# Patient Record
Sex: MALE | Race: WHITE | NOT HISPANIC OR LATINO | Employment: FULL TIME | URBAN - METROPOLITAN AREA
[De-identification: names, ages, dates, MRNs, and addresses within clinical notes are randomized per-mention and may not be internally consistent; named-entity substitution may affect disease eponyms.]

---

## 2017-07-25 ENCOUNTER — GENERIC CONVERSION - ENCOUNTER (OUTPATIENT)
Dept: OTHER | Facility: OTHER | Age: 47
End: 2017-07-25

## 2018-01-04 ENCOUNTER — ALLSCRIPTS OFFICE VISIT (OUTPATIENT)
Dept: OTHER | Facility: OTHER | Age: 48
End: 2018-01-04

## 2018-01-05 NOTE — PROGRESS NOTES
Assessment   1  Change in mole (216 9) (L81 9)    Plan   Change in mole    · Lola Bence MD, Ivan Roman (Dermatology) Co-Management  *eval and treat  Changing mole on left    side posterior scapular  Status: Hold For - Scheduling  Requested for: [id number]  Care Summary provided  : Yes  Health Maintenance    · You need to quit smoking ; Status:Complete - Retrospective Authorization;   Done:    [id number]  PMH: History of influenza vaccination    · Stop: Fluzone Quadrivalent 0 5 ML Intramuscular Suspension Prefilled    Syringe    Discussion/Summary      Change in mole- Refer to Dermatology for removal     The patient was counseled regarding  Possible side effects of new medications were reviewed with the patient/guardian today  The treatment plan was reviewed with the patient/guardian  The patient/guardian understands and agrees with the treatment plan       Self Referrals: No      Chief Complaint   Patient is here today to have mole on back looked at due to change in shape over time      History of Present Illness   Pt has a mole on his posterior shoulder that has been present for a few years  It has been growing in size over a few years  No itching or burning  Review of Systems        Constitutional: No fever or chills, feels well, no tiredness, no recent weight gain or weight loss  Integumentary: skin lesion, but-- as noted in HPI  Active Problems   1  Alcohol use   2  Backache (724 5) (M54 9)   3  Carpal tunnel syndrome (354 0) (G56 00)   4  Depression with anxiety (300 4) (F41 8)    Past Medical History      The active problems and past medical history were reviewed and updated today  Surgical History      The surgical history was reviewed and updated today  Family History   Father    1  Family history of cardiac disorder (V17 49) (Z82 49)     The family history was reviewed and updated today         Social History    · Alcohol use   · Moderate alcohol use  The social history was reviewed and updated today  The social history was reviewed and is unchanged  Current Meds    1  No Reported Medications Recorded     The medication list was reviewed and updated today  Allergies   1  No Known Drug Allergies    Vitals   Vital Signs    Recorded: 14TNX9174 10:16AM   Temperature 96 7 F   Heart Rate 78   Systolic 982   Diastolic 82   Height 6 ft 2 in   Weight 221 lb 6 08 oz   BMI Calculated 28 42   BSA Calculated 2 27   O2 Saturation 96     Physical Exam        Constitutional      General appearance: No acute distress, well appearing and well nourished  Eyes      Conjunctiva and lids: No swelling, erythema, or discharge  Pupils and irises: Equal, round and reactive to light  Ears, Nose, Mouth, and Throat      External inspection of ears and nose: Normal        Pulmonary      Respiratory effort: No increased work of breathing or signs of respiratory distress  Musculoskeletal      Gait and station: Normal        Skin      Skin and subcutaneous tissue: Abnormal  -- left posterior scapula region symmetrical mole 18 mm in lngth, brown in color  Nontender, no erythema or crusting        Psychiatric      Orientation to person, place and time: Normal        Mood and affect: Normal           Signatures    Electronically signed by : Albaro Freedman NP; Jan 4 2018 10:33AM EST                       (Author)     Electronically signed by : Neel Joseph MD; Jan 4 2018 12:03PM EST                       (Co-author)

## 2018-01-16 NOTE — MISCELLANEOUS
Provider Comments  Provider Comments:   Patient did not show for this appointment      Signatures   Electronically signed by : Blank Simon MD; Jul 26 2017  1:49PM EST                       (Author)

## 2018-01-22 VITALS
HEART RATE: 78 BPM | SYSTOLIC BLOOD PRESSURE: 120 MMHG | TEMPERATURE: 96.7 F | WEIGHT: 221.38 LBS | HEIGHT: 74 IN | OXYGEN SATURATION: 96 % | DIASTOLIC BLOOD PRESSURE: 82 MMHG | BODY MASS INDEX: 28.41 KG/M2

## 2018-02-01 ENCOUNTER — OFFICE VISIT (OUTPATIENT)
Dept: FAMILY MEDICINE CLINIC | Facility: CLINIC | Age: 48
End: 2018-02-01
Payer: COMMERCIAL

## 2018-02-01 VITALS
HEIGHT: 74 IN | WEIGHT: 221.4 LBS | RESPIRATION RATE: 18 BRPM | TEMPERATURE: 97 F | HEART RATE: 60 BPM | OXYGEN SATURATION: 97 % | BODY MASS INDEX: 28.42 KG/M2 | DIASTOLIC BLOOD PRESSURE: 76 MMHG | SYSTOLIC BLOOD PRESSURE: 126 MMHG

## 2018-02-01 DIAGNOSIS — S20.219A CONTUSION OF CHEST WALL WITH INTACT SKIN: ICD-10-CM

## 2018-02-01 DIAGNOSIS — Z13.1 DIABETES MELLITUS SCREENING: ICD-10-CM

## 2018-02-01 DIAGNOSIS — Z13.220 LIPID SCREENING: Primary | ICD-10-CM

## 2018-02-01 PROCEDURE — 99214 OFFICE O/P EST MOD 30 MIN: CPT | Performed by: FAMILY MEDICINE

## 2018-02-01 RX ORDER — DOXYCYCLINE HYCLATE 100 MG/1
100 CAPSULE ORAL EVERY 12 HOURS SCHEDULED
Qty: 14 CAPSULE | Refills: 0 | Status: SHIPPED | OUTPATIENT
Start: 2018-02-01 | End: 2018-02-08

## 2018-02-01 NOTE — PROGRESS NOTES
Assessment/Plan:    No problem-specific Assessment & Plan notes found for this encounter  Diagnoses and all orders for this visit:    Lipid screening  -     Lipid panel; Future  -     Lipid panel    Diabetes mellitus screening  -     Comprehensive metabolic panel; Future  -     Comprehensive metabolic panel    Contusion of chest wall with intact skin  -     doxycycline hyclate (VIBRAMYCIN) 100 mg capsule; Take 1 capsule (100 mg total) by mouth every 12 (twelve) hours for 7 days  -     Ambulatory referral to General Surgery; Future    Other orders  -     halobetasol (ULTRAVATE) 0 05 % cream;       After discussing risks and benefits of medication along with side effects will start doxycyline 100 mg po twice daily at this time  Also referred to General surgeon if no movement of symptoms in 1 week     Follow up in 1 week or as needed    Subjective:      Patient ID: Devika Kimbrough is a 52 y o  male  Abscess  Patient presents for evaluation of a cutaneous abscess  Lesion is located in the right inguinal region  Onset was 6 weeks ago  Symptoms have gradually improved  Abscess has associated symptoms of spontaneous drainage  Patient does not have previous history of cutaneous abscesses  Patient does not have diabetes  He states that he was working on a crane and hit his right inguinal area while he was walking down the stairs of his lift hitting himself on the right side  He noted a bruise on that side that was inflamed and was draining recently however it has since stopped             The following portions of the patient's history were reviewed and updated as appropriate:   He  has a past medical history of Eczema  He  does not have a problem list on file  He  has no past surgical history on file  His family history includes Mental illness in his father and mother; Substance Abuse in his father and mother  He  reports that he has been smoking  He has been smoking about 1 00 pack per day   He has quit using smokeless tobacco  His alcohol and drug histories are not on file  Current Outpatient Prescriptions   Medication Sig Dispense Refill    doxycycline hyclate (VIBRAMYCIN) 100 mg capsule Take 1 capsule (100 mg total) by mouth every 12 (twelve) hours for 7 days 14 capsule 0    halobetasol (ULTRAVATE) 0 05 % cream        No current facility-administered medications for this visit  No current outpatient prescriptions on file prior to visit  No current facility-administered medications on file prior to visit  He has No Known Allergies       Review of Systems   Constitutional: Negative for activity change, appetite change, fatigue and fever  HENT: Negative for congestion and ear discharge  Respiratory: Negative for cough and shortness of breath  Cardiovascular: Negative for chest pain and palpitations  Gastrointestinal: Negative for diarrhea and nausea  Musculoskeletal: Negative for arthralgias and back pain  Skin: Positive for color change, rash and wound  Purple skin discoloration noted at the right groin inguinal area, no tenderness or swelling associated with it however  Psychiatric/Behavioral: Negative for agitation and behavioral problems  Objective:     Physical Exam   Constitutional: He is oriented to person, place, and time  He appears well-developed and well-nourished  No distress  Eyes: Pupils are equal, round, and reactive to light  No scleral icterus  Cardiovascular: Normal rate, regular rhythm and normal heart sounds  No murmur heard  Pulmonary/Chest: Effort normal and breath sounds normal  No respiratory distress  He has no wheezes  Abdominal: Soft  Bowel sounds are normal  He exhibits no distension  There is no tenderness  Neurological: He is alert and oriented to person, place, and time  Skin: Skin is warm and dry  Rash noted  He is not diaphoretic     Purple skin discoloration noted at the right groin inguinal area, no tenderness or swelling associated with it however  Psychiatric: He has a normal mood and affect

## 2018-02-14 ENCOUNTER — OFFICE VISIT (OUTPATIENT)
Dept: SURGERY | Facility: CLINIC | Age: 48
End: 2018-02-14
Payer: COMMERCIAL

## 2018-02-14 VITALS
RESPIRATION RATE: 14 BRPM | HEIGHT: 74 IN | HEART RATE: 78 BPM | BODY MASS INDEX: 27.72 KG/M2 | TEMPERATURE: 98.3 F | SYSTOLIC BLOOD PRESSURE: 122 MMHG | WEIGHT: 216.04 LBS | DIASTOLIC BLOOD PRESSURE: 74 MMHG

## 2018-02-14 DIAGNOSIS — L73.2 HIDRADENITIS SUPPURATIVA: ICD-10-CM

## 2018-02-14 DIAGNOSIS — S20.219A CONTUSION OF CHEST WALL WITH INTACT SKIN: Primary | ICD-10-CM

## 2018-02-14 PROCEDURE — 99204 OFFICE O/P NEW MOD 45 MIN: CPT | Performed by: SURGERY

## 2018-02-14 RX ORDER — CLINDAMYCIN HYDROCHLORIDE 300 MG/1
300 CAPSULE ORAL 2 TIMES DAILY
Qty: 60 CAPSULE | Refills: 2 | Status: SHIPPED | OUTPATIENT
Start: 2018-02-14 | End: 2018-05-15

## 2018-02-14 NOTE — PROGRESS NOTES
GENERAL SURGERY CONSULT      Camillia Alpers 52 y o  male MRN: [id number]  Unit/Bed#:  Encounter: [id number]      Assessment/Plan   Patient Active Problem List   Diagnosis    Hidradenitis suppurativa     Patient with what appears to be recurrence hidradenitis affecting the right groin  I suspect the patient underwent a previous excision of the same 2 years ago  I do not appreciate any fluctuance requiring drainage at this time  Will start him on a course of oral clindamycin 300 b i d  for the next several months  Will reassess in 1 month to see if he has improved  Should the patient fail may require addition of rifampin and should fully fail medical treatment may require further surgical excision  Chief Complaint drainage swelling right groin    HPI: Camillia Alpers is a 52y o  year old male who presents with almost 2 months pain and drainage from the right growing  Patient has had previous cysts excised from bilateral groin approximately 2 years ago  He is not certain of the exact nature of the disease at that time but he said it was somewhat similar to what he is currently  Denies any previous history of similar soft tissue infections or masses  Denies any previous other surgical history  No current medical issues no daily medications  No other acute concerns  Patient does work as a  therefore he is sitting prone and potentially high heat with lots of sweating for prolonged periods of time  Was provided a script by PCP that he did not start  Drainage is about the same since his last visit  ROS:  12 Point ROS reviewed and negative except for:  Swelling and drainage from the right groin  Otherwise negative    Historical Information   Past Medical History:   Diagnosis Date    Eczema      No past surgical history on file    Social History   History   Alcohol Use    Yes     Comment: social     History   Drug Use No     History   Smoking Status    Current Every Day Smoker    Packs/day: 1 00    Years: 20 00   Smokeless Tobacco    Former User     Family History: no pertinent family history  No Known Allergies  Meds/Allergies   all current active meds have been reviewed      Objective   Vitals: Blood pressure 122/74, pulse 78, temperature 98 3 °F (36 8 °C), temperature source Oral, resp  rate 14, height 6' 2" (1 88 m), weight 98 kg (216 lb 0 6 oz)  ,Body mass index is 27 74 kg/m²  Invasive Devices          No matching active lines, drains, or airways          Physical Exam:    General appearance: Awake alert oriented no acute distress  HEENT: Sclera clear, anicterus, no discharge  Neck: Trachea is midline, no adenopathy  Lungs: No respiratory distress, clear to auscultation bilaterally  Heart[de-identified] RRR  Abdomen: soft, nondistended, nontender  Extremities: No edema, nontender  Skin:No rashes  Bilateral groin previous surgical scars no acute issues  Inferior to the previous excision site on the right is an area of scarring with pits with thin expressible drainage  Similar findings to an area closer to the skin crease near the scrotum  There is no fluctuance or extensive induration or overlying erythema  Appearance at both sites consistent with hidradenitis  Exam of the bilateral axilla showed no disease    Neurologic: No weakness or loss of sensation  Psych: Affect appropriate    Nas Caruso MD  2/14/2018

## 2018-09-12 ENCOUNTER — OFFICE VISIT (OUTPATIENT)
Dept: SURGERY | Facility: CLINIC | Age: 48
End: 2018-09-12
Payer: COMMERCIAL

## 2018-09-12 VITALS
HEIGHT: 74 IN | BODY MASS INDEX: 29.26 KG/M2 | HEART RATE: 76 BPM | SYSTOLIC BLOOD PRESSURE: 130 MMHG | RESPIRATION RATE: 13 BRPM | WEIGHT: 228 LBS | TEMPERATURE: 97.8 F | DIASTOLIC BLOOD PRESSURE: 78 MMHG

## 2018-09-12 DIAGNOSIS — L73.2 HIDRADENITIS SUPPURATIVA: Primary | ICD-10-CM

## 2018-09-12 PROCEDURE — 99214 OFFICE O/P EST MOD 30 MIN: CPT | Performed by: SURGERY

## 2018-09-12 RX ORDER — CLINDAMYCIN HYDROCHLORIDE 300 MG/1
300 CAPSULE ORAL 2 TIMES DAILY
Qty: 60 CAPSULE | Refills: 1 | Status: SHIPPED | OUTPATIENT
Start: 2018-09-12 | End: 2018-09-12 | Stop reason: SDUPTHER

## 2018-09-12 RX ORDER — CLINDAMYCIN HYDROCHLORIDE 300 MG/1
300 CAPSULE ORAL 2 TIMES DAILY
Qty: 60 CAPSULE | Refills: 0 | Status: SHIPPED | OUTPATIENT
Start: 2018-09-12 | End: 2018-11-11

## 2018-09-12 NOTE — PROGRESS NOTES
GENERAL SURGERY HISTORY AND PHYSICAL     Kenia Sim 52 y o  male MRN: [id number]  Unit/Bed#:  Encounter: [id number]      Assessment/Plan   1  Hidradenitis suppurativa  clindamycin (CLEOCIN) 300 MG capsule    DISCONTINUED: clindamycin (CLEOCIN) 300 MG capsule     Patient has findings consistent with recurrent hidradenitis of the right thigh crease  Given the patient's description I suspect he had a previous excision of similar findings in the past   I do not see any definitive fluid collection amenable to surgical drainage at this time  Will start patient on oral clindamycin again which she seemed to respond well to after his last visit  Will have patient back in 1 month for further assessment  Given the since the location and proximity to the scrotum I would likely request a plastics evaluation for any surgical intervention at the site  Again will have patient back in 1 month for further assessment  Chief Complaint right groin/scrotal drainage swelling    HPI: Kenia Sim is a 52y o  year old male   known to me, evaluated during February of this year and diagnosed with likely hidradenitis of the bilateral groin  Patient underwent previous excision of these areas  Start him on a course of clindamycin due to swelling and drainage in the right groin  Patient states that his symptoms resolved on the right growing  Though he did say shortly thereafter began to have swelling and drainage from an area on the proximal scrotum just in the thigh crease  Patient states he had had a previous excision of a cyst from this area  Does not believe there is any intervention done on spermatic cord structures  Patient denies fevers chills no other acute changes  ROS:  12 Point ROS reviewed and negative except for:   Drainage from the right groin, scrotum    Historical Information   Past Medical History:   Diagnosis Date    Eczema      No past surgical history on file    Social History   History   Alcohol Use  Yes     Comment: social; moderate     History   Drug Use No     History   Smoking Status    Current Every Day Smoker    Packs/day: 1 00    Years: 20 00   Smokeless Tobacco    Former User     Family History: no pertinent family history  No Known Allergies  Meds/Allergies   all current active meds have been reviewed      Objective   Vitals: Blood pressure 130/78, pulse 76, temperature 97 8 °F (36 6 °C), temperature source Oral, resp  rate 13, height 6' 2" (1 88 m), weight 103 kg (228 lb)  ,Body mass index is 29 27 kg/m²  Physical Exam:    General appearance: Awake alert oriented no acute distress  HEENT: Sclera clear, anicterus, no discharge  Neck: Trachea is midline  Lungs: No respiratory distress  Skin:  Bilateral groin in the inguinal area there is previous scarring from hidradenitis and previous excision  Stable no extensive inflammation or drainage  Within the thigh crease itself at the most proximal portion of the scrotum there is an area of thickened tissue with multiple pits with thin expressible serous drainage no overlying erythema no dalia purulence no palpable fluctuance amenable to drainage at this time    Neurologic:   Ambulating with no difficulty  Psych: Affect appropriate    Jhon Laurent MD  9/12/2018

## 2018-10-24 ENCOUNTER — OFFICE VISIT (OUTPATIENT)
Dept: SURGERY | Facility: CLINIC | Age: 48
End: 2018-10-24
Payer: COMMERCIAL

## 2018-10-24 VITALS
RESPIRATION RATE: 14 BRPM | DIASTOLIC BLOOD PRESSURE: 90 MMHG | HEIGHT: 74 IN | TEMPERATURE: 98 F | SYSTOLIC BLOOD PRESSURE: 138 MMHG | WEIGHT: 237 LBS | BODY MASS INDEX: 30.42 KG/M2 | HEART RATE: 74 BPM

## 2018-10-24 DIAGNOSIS — L73.2 HIDRADENITIS SUPPURATIVA: Primary | ICD-10-CM

## 2018-10-24 PROCEDURE — 99214 OFFICE O/P EST MOD 30 MIN: CPT | Performed by: SURGERY

## 2018-10-24 NOTE — PROGRESS NOTES
GENERAL SURGERY HISTORY AND PHYSICAL     Cammie Shoemaker 50 y o  male MRN: [id number]  Unit/Bed#:  Encounter: [id number]      Assessment/Plan   1  Hidradenitis suppurativa  Ambulatory referral to Plastic Surgery     Patient with worsening hidradenitis of the right growing  In spite of antibiotic therapy patient still has significant swelling and drainage from the site  Given the recurrent nature of the patient's disease, previous excisions of been done in the past   Also given the location of the patient's worst disease I recommend he undergo evaluation and treatment by plastic surgeon  Recommend he continue with the oral clindamycin to help control symptoms  Chief Complaint drainage from the right groin    HPI: Cammie Shoemaker is a 50y o  year old male who presents for follow-up regarding hidradenitis of the groin  Patient said was initially doing well with a course of oral clinda been began to have increasing swelling and drainage from the right growing  Patient is again undergone multiple previous excisions of hidradenitis and groins as well as the thigh creases themselves  No other acute changes      ROS:  12 Point ROS reviewed and negative except for:  Drainage swelling the right groin    Historical Information   Past Medical History:   Diagnosis Date    Eczema      No past surgical history on file  Social History   History   Alcohol Use    Yes     Comment: social; moderate     History   Drug Use No     History   Smoking Status    Current Every Day Smoker    Packs/day: 1 00    Years: 20 00   Smokeless Tobacco    Former User     Family History: no pertinent family history  No Known Allergies  Meds/Allergies   all current active meds have been reviewed      Objective   Vitals: Blood pressure 138/90, pulse 74, temperature 98 °F (36 7 °C), temperature source Oral, resp  rate 14, height 6' 2" (1 88 m), weight 108 kg (237 lb)  ,Body mass index is 30 43 kg/m²    Physical Exam:    General appearance: Awake alert oriented no acute distress  HEENT: Sclera clear, anicterus, no discharge  Neck: Trachea is midline  Lungs: No respiratory distress  Extremities: No edema, nontender  Skin:  Stable changes of scarring in the inguinal region bilaterally  Previous pits and excision sites are noted here  There is increased swelling and drainage from an area just proximal to the scrotum in the right thigh crease  There is no palpable fluctuance or tense fluid collections necessitating drainage at this time  Easily able to express thin fluid from the area  Some changes of hidradenitis in the similar location on the left side but none actively inflamed or requiring intervention    Neurologic: No weakness or loss of sensation  Psych: Affect appropriate    Darren Saldana MD  10/24/2018

## 2018-10-25 ENCOUNTER — TELEPHONE (OUTPATIENT)
Dept: SURGERY | Facility: CLINIC | Age: 48
End: 2018-10-25

## 2018-10-25 NOTE — TELEPHONE ENCOUNTER
Called plastic surgery to set up an appointment and was told by [facility] that they will call the pt  Called the listed number to inform the pt of this and left a message with his mother stating the above

## 2019-08-26 ENCOUNTER — OFFICE VISIT (OUTPATIENT)
Dept: FAMILY MEDICINE CLINIC | Facility: CLINIC | Age: 49
End: 2019-08-26
Payer: COMMERCIAL

## 2019-08-26 VITALS
WEIGHT: 256 LBS | HEIGHT: 74 IN | TEMPERATURE: 98.2 F | OXYGEN SATURATION: 95 % | SYSTOLIC BLOOD PRESSURE: 146 MMHG | BODY MASS INDEX: 32.85 KG/M2 | DIASTOLIC BLOOD PRESSURE: 94 MMHG | HEART RATE: 70 BPM

## 2019-08-26 DIAGNOSIS — R21 RASH: ICD-10-CM

## 2019-08-26 DIAGNOSIS — L40.9 PSORIASIS: Primary | ICD-10-CM

## 2019-08-26 PROCEDURE — 4004F PT TOBACCO SCREEN RCVD TLK: CPT | Performed by: FAMILY MEDICINE

## 2019-08-26 PROCEDURE — 99214 OFFICE O/P EST MOD 30 MIN: CPT | Performed by: FAMILY MEDICINE

## 2019-08-26 PROCEDURE — 3008F BODY MASS INDEX DOCD: CPT | Performed by: FAMILY MEDICINE

## 2019-08-26 RX ORDER — TRIAMCINOLONE ACETONIDE 1 MG/G
CREAM TOPICAL 2 TIMES DAILY
Qty: 30 G | Refills: 0 | Status: SHIPPED | OUTPATIENT
Start: 2019-08-26 | End: 2019-08-26 | Stop reason: CLARIF

## 2019-08-26 NOTE — PROGRESS NOTES
Hector Starks 1970 male MRN: [id number]      ASSESSMENT/PLAN  Problem List Items Addressed This Visit        Musculoskeletal and Integument    Psoriasis - Primary    Relevant Medications    halobetasol (ULTRAVATE) 0 05 % cream        Rash likely psoriatic  No associated joint symptoms at this time  Will trial high potency steroid and RTC in 1 month to monitor improvement  BMI Counseling: Body mass index is 32 87 kg/m²  Discussed the patient's BMI with him  The BMI is above average  BMI counseling and education was provided to the patient  Nutrition recommendations include reducing portion sizes, decreasing overall calorie intake, 3-5 servings of fruits/vegetables daily, reducing fast food intake and consuming healthier snacks  Exercise recommendations include exercising 3-5 times per week  Future Appointments   Date Time Provider Shaun Velasquez   9/23/2019 11:20 AM DO CARLOS MANUEL [facility]          SUBJECTIVE  CC: Eczema      HPI:  Hector Starks is a 50 y o  male who presents for an acute visit  1 month history of rash  - Nothing changed prior to onset (no new medications, exposures)  - Pruritic  - Pt denies having this previously  - States it started as small circles that "spread" into larger areas   - Has not tried anything to improve symptoms     Review of Systems   Musculoskeletal: Negative for arthralgias and joint swelling  Skin: Positive for rash  Historical Information   The patient history was reviewed and updated as follows:    Past Medical History:   Diagnosis Date    Chest pain 9/25/2014    Eczema     Rib pain 1/28/2015     History reviewed  No pertinent surgical history    Family History   Problem Relation Age of Onset    Mental illness Mother         denied    Substance Abuse Mother         denied    Mental illness Father         denied    Substance Abuse Father         denied    Heart disease Father         cardiac disorder      Social History Social History     Substance and Sexual Activity   Alcohol Use Yes    Comment: social; moderate     Social History     Substance and Sexual Activity   Drug Use No     Social History     Tobacco Use   Smoking Status Current Every Day Smoker    Packs/day: 1 00    Years: 20 00    Pack years: 20 00   Smokeless Tobacco Former User       Medications:     Current Outpatient Medications:     halobetasol (ULTRAVATE) 0 05 % cream, Apply topically once for 1 dose, Disp: 50 g, Rfl: 1  No Known Allergies    OBJECTIVE    Vitals:   Vitals:    08/26/19 0841   BP: 146/94   Pulse: 70   Temp: 98 2 °F (36 8 °C)   SpO2: 95%   Weight: 116 kg (256 lb)   Height: 6' 2" (1 88 m)           Physical Exam   Constitutional: He appears well-developed and well-nourished  No distress  HENT:   Head: Normocephalic and atraumatic  Pulmonary/Chest: Effort normal    Neurological: He is alert  Skin:   Thickened purple/red plaques over LLE (as pictured below), as well as scaly silver plaques over R elbow and R 4th and 5th digits   Psychiatric: He has a normal mood and affect                            DO Nakia N[facility]   8/26/2019  9:46 AM

## 2019-09-23 ENCOUNTER — OFFICE VISIT (OUTPATIENT)
Dept: FAMILY MEDICINE CLINIC | Facility: CLINIC | Age: 49
End: 2019-09-23
Payer: COMMERCIAL

## 2019-09-23 VITALS
HEIGHT: 74 IN | BODY MASS INDEX: 32.98 KG/M2 | WEIGHT: 257 LBS | TEMPERATURE: 97.8 F | SYSTOLIC BLOOD PRESSURE: 148 MMHG | OXYGEN SATURATION: 96 % | DIASTOLIC BLOOD PRESSURE: 86 MMHG | HEART RATE: 72 BPM

## 2019-09-23 DIAGNOSIS — R21 RASH: ICD-10-CM

## 2019-09-23 DIAGNOSIS — L40.9 PSORIASIS: Primary | ICD-10-CM

## 2019-09-23 PROBLEM — L73.2 HIDRADENITIS SUPPURATIVA: Status: RESOLVED | Noted: 2018-02-14 | Resolved: 2019-09-23

## 2019-09-23 PROCEDURE — 99213 OFFICE O/P EST LOW 20 MIN: CPT | Performed by: FAMILY MEDICINE

## 2019-09-23 PROCEDURE — 3008F BODY MASS INDEX DOCD: CPT | Performed by: FAMILY MEDICINE

## 2019-10-14 DIAGNOSIS — L40.9 PSORIASIS: Primary | ICD-10-CM

## 2019-10-14 RX ORDER — TRIAMCINOLONE ACETONIDE 1 MG/G
CREAM TOPICAL
COMMUNITY
Start: 2019-08-26 | End: 2019-10-14 | Stop reason: SDUPTHER

## 2019-10-14 RX ORDER — TRIAMCINOLONE ACETONIDE 1 MG/G
CREAM TOPICAL 2 TIMES DAILY
Qty: 30 G | Refills: 1 | Status: SHIPPED | OUTPATIENT
Start: 2019-10-14 | End: 2019-11-23 | Stop reason: SDUPTHER

## 2019-11-23 DIAGNOSIS — L40.9 PSORIASIS: ICD-10-CM

## 2019-11-25 RX ORDER — TRIAMCINOLONE ACETONIDE 1 MG/G
CREAM TOPICAL
Qty: 30 G | Refills: 1 | Status: SHIPPED | OUTPATIENT
Start: 2019-11-25 | End: 2020-06-01 | Stop reason: ALTCHOICE

## 2020-03-12 ENCOUNTER — TRANSCRIBE ORDERS (OUTPATIENT)
Dept: ADMINISTRATIVE | Facility: HOSPITAL | Age: 50
End: 2020-03-12

## 2020-03-12 DIAGNOSIS — G47.30 SLEEP APNEA, UNSPECIFIED TYPE: Primary | ICD-10-CM

## 2020-03-26 ENCOUNTER — TELEPHONE (OUTPATIENT)
Dept: FAMILY MEDICINE CLINIC | Facility: CLINIC | Age: 50
End: 2020-03-26

## 2020-03-26 ENCOUNTER — TELEMEDICINE (OUTPATIENT)
Dept: FAMILY MEDICINE CLINIC | Facility: CLINIC | Age: 50
End: 2020-03-26
Payer: COMMERCIAL

## 2020-03-26 DIAGNOSIS — Z20.828 EXPOSURE TO SARS-ASSOCIATED CORONAVIRUS: Primary | ICD-10-CM

## 2020-03-26 PROCEDURE — 99212 OFFICE O/P EST SF 10 MIN: CPT | Performed by: PHYSICIAN ASSISTANT

## 2020-03-26 NOTE — TELEPHONE ENCOUNTER
Called patient, no answer  We do not test asymptomatic individuals  If he has suspected or confirmed contact with known COVID-19 patient, and remains asymptomatic, he should undergo 14 self quarentine and avoid possible exposure to the public  If he develops sx of SOB, dry cough, fever, sore throat, extreme fatigue etc he would become a candidate for testing

## 2020-03-26 NOTE — TELEPHONE ENCOUNTER
Patient' girlfriend called in  Patient currently works in [address] [place] [place] [address]) -   exposed to people for were tested and confirmed with Covid-19    No Fever or symptoms right now      Last day in [place] is today -    Phone number is [phone]

## 2020-03-26 NOTE — PROGRESS NOTES
COVID-19 Virtual Visit     This virtual check-in was done via telephone  Encounter provider Davis Schwab Russella Roger, PA-C    Provider located at [place] [address]  [address]  [address] [place] [id number]    Recent Visits  No visits were found meeting these conditions  Showing recent visits within past 7 days and meeting all other requirements     Today's Visits  Date Type Provider Dept   03/26/20 Telemedicine MELISSA Lei [facility]   03/26/20 Telephone Tigre Bernal MD [facility] [address] today's visits and meeting all other requirements     Future Appointments  Date Type Provider Dept   03/26/20 Telemedicine MELISSA [facility]   Showing future appointments within next 150 days and meeting all other requirements        Patient agrees to participate in a virtual check in via telephone or video visit instead of presenting to the office to address urgent/immediate medical needs  Patient is aware this is a billable service  After connecting through telephone, the patient was identified by name and date of birth  Blanco Rebollar was informed that this was a telemedicine visit and that the exam was being conducted confidentially over secure lines  My office door was closed  No one else was in the room  Blanco Rebollar acknowledged consent and understanding of privacy and security of the telemedicine visit  I informed the patient that I have reviewed his record in Epic and presented the opportunity for him to ask any questions regarding the visit today  The patient agreed to participate  Blanco Rebollar is a 52 y o  male who is concerned about COVID-19  He reports no symptoms  He has traveled to [facility] daily within the last 14 days  He has had contact with a person who is under investigation for or who is positive for COVID-19 within the last 14 days   He has not been hospitalized recently for fever and/or lower respiratory symptoms  Past Medical History:   Diagnosis Date    Chest pain 9/25/2014    Eczema     Hidradenitis suppurativa 2/14/2018    Rib pain 1/28/2015       No past surgical history on file  Current Outpatient Medications   Medication Sig Dispense Refill    halobetasol (ULTRAVATE) 0 05 % cream Apply topically once for 1 dose 200 g 5    triamcinolone (KENALOG) 0 1 % cream APPLY TO AFFECTED AREA TWICE A DAY 30 g 1     No current facility-administered medications for this visit  No Known Allergies       Disposition:      I recommended self-quarantine for 14 days and to call back for worsening symptoms or development of shortness of breath  I spent 10 minutes with the patient during this virtual check-in visit

## 2020-04-21 ENCOUNTER — TELEPHONE (OUTPATIENT)
Dept: SLEEP CENTER | Facility: CLINIC | Age: 50
End: 2020-04-21

## 2020-05-29 ENCOUNTER — TELEPHONE (OUTPATIENT)
Dept: FAMILY MEDICINE CLINIC | Facility: CLINIC | Age: 50
End: 2020-05-29

## 2020-06-01 ENCOUNTER — OFFICE VISIT (OUTPATIENT)
Dept: FAMILY MEDICINE CLINIC | Facility: CLINIC | Age: 50
End: 2020-06-01
Payer: COMMERCIAL

## 2020-06-01 VITALS
HEART RATE: 71 BPM | BODY MASS INDEX: 35.16 KG/M2 | WEIGHT: 274 LBS | TEMPERATURE: 98.6 F | SYSTOLIC BLOOD PRESSURE: 150 MMHG | DIASTOLIC BLOOD PRESSURE: 90 MMHG | HEIGHT: 74 IN | OXYGEN SATURATION: 97 %

## 2020-06-01 DIAGNOSIS — F10.10 ALCOHOL ABUSE: ICD-10-CM

## 2020-06-01 DIAGNOSIS — N50.89 TESTICULAR SWELLING, RIGHT: ICD-10-CM

## 2020-06-01 DIAGNOSIS — Z13.220 NEED FOR LIPID SCREENING: ICD-10-CM

## 2020-06-01 DIAGNOSIS — I10 ESSENTIAL HYPERTENSION: ICD-10-CM

## 2020-06-01 DIAGNOSIS — F17.200 SMOKER: Primary | ICD-10-CM

## 2020-06-01 DIAGNOSIS — Z13.1 SCREENING FOR DIABETES MELLITUS: ICD-10-CM

## 2020-06-01 PROCEDURE — 3077F SYST BP >= 140 MM HG: CPT | Performed by: FAMILY MEDICINE

## 2020-06-01 PROCEDURE — 3008F BODY MASS INDEX DOCD: CPT | Performed by: FAMILY MEDICINE

## 2020-06-01 PROCEDURE — 3080F DIAST BP >= 90 MM HG: CPT | Performed by: FAMILY MEDICINE

## 2020-06-01 PROCEDURE — 99214 OFFICE O/P EST MOD 30 MIN: CPT | Performed by: FAMILY MEDICINE

## 2020-06-01 RX ORDER — POLYETHYLENE GLYCOL 3350 17 G
4 POWDER IN PACKET (EA) ORAL AS NEEDED
Qty: 100 EACH | Refills: 1 | Status: SHIPPED | OUTPATIENT
Start: 2020-06-01 | End: 2021-11-16 | Stop reason: ALTCHOICE

## 2020-06-02 DIAGNOSIS — N50.89 TESTICULAR SWELLING, RIGHT: Primary | ICD-10-CM

## 2020-06-03 DIAGNOSIS — N50.89 TESTICULAR SWELLING, RIGHT: Primary | ICD-10-CM

## 2020-06-23 ENCOUNTER — TELEPHONE (OUTPATIENT)
Dept: UROLOGY | Facility: MEDICAL CENTER | Age: 50
End: 2020-06-23

## 2020-06-30 ENCOUNTER — OFFICE VISIT (OUTPATIENT)
Dept: UROLOGY | Facility: CLINIC | Age: 50
End: 2020-06-30
Payer: COMMERCIAL

## 2020-06-30 VITALS
TEMPERATURE: 97.3 F | DIASTOLIC BLOOD PRESSURE: 100 MMHG | HEART RATE: 86 BPM | SYSTOLIC BLOOD PRESSURE: 160 MMHG | BODY MASS INDEX: 35.81 KG/M2 | HEIGHT: 74 IN | WEIGHT: 279 LBS

## 2020-06-30 DIAGNOSIS — N50.819 TESTICLE PAIN: Primary | ICD-10-CM

## 2020-06-30 DIAGNOSIS — L73.2 HYDRADENITIS: ICD-10-CM

## 2020-06-30 LAB
SL AMB  POCT GLUCOSE, UA: NORMAL
SL AMB LEUKOCYTE ESTERASE,UA: NORMAL
SL AMB POCT BILIRUBIN,UA: NORMAL
SL AMB POCT BLOOD,UA: NORMAL
SL AMB POCT CLARITY,UA: CLEAR
SL AMB POCT COLOR,UA: YELLOW
SL AMB POCT KETONES,UA: NORMAL
SL AMB POCT NITRITE,UA: NORMAL
SL AMB POCT PH,UA: 5
SL AMB POCT SPECIFIC GRAVITY,UA: 1.02
SL AMB POCT URINE PROTEIN: NORMAL
SL AMB POCT UROBILINOGEN: NORMAL

## 2020-06-30 PROCEDURE — 3008F BODY MASS INDEX DOCD: CPT | Performed by: UROLOGY

## 2020-06-30 PROCEDURE — 3080F DIAST BP >= 90 MM HG: CPT | Performed by: UROLOGY

## 2020-06-30 PROCEDURE — 3077F SYST BP >= 140 MM HG: CPT | Performed by: UROLOGY

## 2020-06-30 PROCEDURE — 81002 URINALYSIS NONAUTO W/O SCOPE: CPT | Performed by: UROLOGY

## 2020-06-30 PROCEDURE — 99243 OFF/OP CNSLTJ NEW/EST LOW 30: CPT | Performed by: UROLOGY

## 2020-08-08 ENCOUNTER — APPOINTMENT (OUTPATIENT)
Dept: LAB | Facility: CLINIC | Age: 50
End: 2020-08-08
Payer: COMMERCIAL

## 2020-08-08 DIAGNOSIS — Z13.220 NEED FOR LIPID SCREENING: ICD-10-CM

## 2020-08-08 DIAGNOSIS — Z13.1 SCREENING FOR DIABETES MELLITUS: ICD-10-CM

## 2020-08-08 LAB
ALBUMIN SERPL BCP-MCNC: 3.7 G/DL (ref 3.5–5)
ALP SERPL-CCNC: 110 U/L (ref 46–116)
ALT SERPL W P-5'-P-CCNC: 43 U/L (ref 12–78)
ANION GAP SERPL CALCULATED.3IONS-SCNC: 5 MMOL/L (ref 4–13)
AST SERPL W P-5'-P-CCNC: 22 U/L (ref 5–45)
BILIRUB SERPL-MCNC: 0.49 MG/DL (ref 0.2–1)
BUN SERPL-MCNC: 13 MG/DL (ref 5–25)
CALCIUM SERPL-MCNC: 9.7 MG/DL (ref 8.3–10.1)
CHLORIDE SERPL-SCNC: 110 MMOL/L (ref 100–108)
CHOLEST SERPL-MCNC: 207 MG/DL (ref 50–200)
CO2 SERPL-SCNC: 27 MMOL/L (ref 21–32)
CREAT SERPL-MCNC: 1.14 MG/DL (ref 0.6–1.3)
GFR SERPL CREATININE-BSD FRML MDRD: 75 ML/MIN/1.73SQ M
GLUCOSE P FAST SERPL-MCNC: 107 MG/DL (ref 65–99)
HDLC SERPL-MCNC: 54 MG/DL
LDLC SERPL CALC-MCNC: 133 MG/DL (ref 0–100)
NONHDLC SERPL-MCNC: 153 MG/DL
POTASSIUM SERPL-SCNC: 4.2 MMOL/L (ref 3.5–5.3)
PROT SERPL-MCNC: 8.4 G/DL (ref 6.4–8.2)
SODIUM SERPL-SCNC: 142 MMOL/L (ref 136–145)
TRIGL SERPL-MCNC: 102 MG/DL

## 2020-08-08 PROCEDURE — 80053 COMPREHEN METABOLIC PANEL: CPT

## 2020-08-08 PROCEDURE — 36415 COLL VENOUS BLD VENIPUNCTURE: CPT

## 2020-08-08 PROCEDURE — 80061 LIPID PANEL: CPT

## 2020-08-11 DIAGNOSIS — E78.2 MIXED HYPERLIPIDEMIA: Primary | ICD-10-CM

## 2020-08-11 RX ORDER — ATORVASTATIN CALCIUM 10 MG/1
10 TABLET, FILM COATED ORAL DAILY
Qty: 30 TABLET | Refills: 2 | Status: SHIPPED | OUTPATIENT
Start: 2020-08-11 | End: 2022-07-01

## 2020-09-11 ENCOUNTER — CONSULT (OUTPATIENT)
Dept: PLASTIC SURGERY | Facility: CLINIC | Age: 50
End: 2020-09-11
Payer: COMMERCIAL

## 2020-09-11 VITALS — WEIGHT: 279 LBS | BODY MASS INDEX: 35.81 KG/M2 | HEIGHT: 74 IN | TEMPERATURE: 98.4 F

## 2020-09-11 DIAGNOSIS — L73.2 HYDRADENITIS: Primary | ICD-10-CM

## 2020-09-11 PROCEDURE — 99242 OFF/OP CONSLTJ NEW/EST SF 20: CPT | Performed by: PLASTIC SURGERY

## 2020-09-11 RX ORDER — DOXYCYCLINE HYCLATE 100 MG/1
100 CAPSULE ORAL EVERY 12 HOURS SCHEDULED
Qty: 28 CAPSULE | Refills: 2 | Status: SHIPPED | OUTPATIENT
Start: 2020-09-11 | End: 2020-09-25

## 2020-09-11 NOTE — PROGRESS NOTES
Assessment/Plan:      Diagnoses and all orders for this visit:    Hydradenitis  -     doxycycline hyclate (VIBRAMYCIN) 100 mg capsule; Take 1 capsule (100 mg total) by mouth every 12 (twelve) hours for 14 days        I discussed that I felt that this lesion is: benign  We discussed that he likely has some persistent hidradenitis in the area but he symptoms are mild with no infection that has require hospital visit or pain in the area  I explained that there is no cure for this and for the most part controlling the symptoms and flares as much as possible  I explained the role of excision of hidradenitis and what it entails, the indications of surgery and the various ways of ultimately obtaining a closed wound  Briefly, wide excision of an entire affected area of HS/AI (with surgical margins well beyond the clinical borders of disease activity) combined with continued aggressive medical management is the treatment most likely to achieve disease cure in patients with chronic and extensive stage III disease   I discussed that given how minor symptoms, recommend conservative management with avoiding moisture in the area, aggressive hygiene and antibiotics for any flares  The patient would manifest understanding and will obtain prescription of suppressive antibiotics and return in 3 months to check  A total of 30 minutes of face-to-face time was spent in this encounter, of which >50% was spent in counseling  Marco Ray MD   [facility] Re[facility]ive Surgery   [address]   [address]   Office: [phone]          Subjective:     Patient ID: [id number] Bird is a 52 y o  male  Mr Lisa Mckeon is a 52years old male who presents for evaluation of his genital area  As per the patient he was found to have hidradenitis and underwent excision and closure of bilateral inguinal crease 4 years ago in [place]   He has done well, but over the last 6 months he has had intermitted drainage of clear fluid mostly associated in hot and humid days while at work in [employer]  He denies fever, redness or pain in the area  Patent is active smoker  He does have a history of Psoriasis for which no medication is used for the moment  Review of Systems   Constitutional: Negative  HENT: Negative  Eyes: Negative  Respiratory: Negative  Cardiovascular: Negative  Endocrine: Negative  Genitourinary: Positive for scrotal swelling  Musculoskeletal: Negative  Skin: Positive for wound  Allergic/Immunologic: Negative  Neurological: Negative  Hematological: Negative  Psychiatric/Behavioral: Negative  Objective:     Physical Exam  Constitutional:       Appearance: Normal appearance  HENT:      Head: Normocephalic and atraumatic  Eyes:      Extraocular Movements: Extraocular movements intact  Pupils: Pupils are equal, round, and reactive to light  Neck:      Musculoskeletal: Normal range of motion  Cardiovascular:      Rate and Rhythm: Normal rate and regular rhythm  Pulmonary:      Effort: Pulmonary effort is normal    Genitourinary:     Penis: Circumcised  Discharge present  No erythema or tenderness  Comments: Bilateral inguinal crease scar with expected induration  Musculoskeletal: Normal range of motion  Neurological:      General: No focal deficit present  Mental Status: He is alert and oriented to person, place, and time

## 2021-03-29 ENCOUNTER — IMMUNIZATIONS (OUTPATIENT)
Dept: FAMILY MEDICINE CLINIC | Facility: HOSPITAL | Age: 51
End: 2021-03-29

## 2021-03-29 DIAGNOSIS — Z23 ENCOUNTER FOR IMMUNIZATION: Primary | ICD-10-CM

## 2021-03-29 PROCEDURE — 91300 SARS-COV-2 / COVID-19 MRNA VACCINE (PFIZER-BIONTECH) 30 MCG: CPT

## 2021-03-29 PROCEDURE — 0001A SARS-COV-2 / COVID-19 MRNA VACCINE (PFIZER-BIONTECH) 30 MCG: CPT

## 2021-04-09 ENCOUNTER — TELEMEDICINE (OUTPATIENT)
Dept: FAMILY MEDICINE CLINIC | Facility: CLINIC | Age: 51
End: 2021-04-09
Payer: COMMERCIAL

## 2021-04-09 DIAGNOSIS — Z20.822 EXPOSURE TO COVID-19 VIRUS: ICD-10-CM

## 2021-04-09 DIAGNOSIS — Z20.822 EXPOSURE TO COVID-19 VIRUS: Primary | ICD-10-CM

## 2021-04-09 PROBLEM — N50.819 TESTICLE PAIN: Status: RESOLVED | Noted: 2020-06-30 | Resolved: 2021-04-09

## 2021-04-09 PROBLEM — Z13.220 NEED FOR LIPID SCREENING: Status: RESOLVED | Noted: 2020-06-01 | Resolved: 2021-04-09

## 2021-04-09 PROBLEM — Z13.1 SCREENING FOR DIABETES MELLITUS: Status: RESOLVED | Noted: 2020-06-01 | Resolved: 2021-04-09

## 2021-04-09 PROBLEM — N50.89 TESTICULAR SWELLING, RIGHT: Status: RESOLVED | Noted: 2020-06-01 | Resolved: 2021-04-09

## 2021-04-09 PROCEDURE — 99442 PR PHYS/QHP TELEPHONE EVALUATION 11-20 MIN: CPT | Performed by: FAMILY MEDICINE

## 2021-04-09 NOTE — PROGRESS NOTES
COVID-19 Outpatient Progress Note    Assessment/Plan:    Problem List Items Addressed This Visit     None      Visit Diagnoses     Exposure to COVID-19 virus    -  Primary    Relevant Orders    Novel Coronavirus (Covid-19),PCR [facility] - Collected at   [facility] or [facility]         Disposition:     I recommended COVID-19 PCR testing on or after day 5 since last exposure and if negative can end quarantine after 7 days  Patient was instructed to watch for symptoms until 14 days after exposure  If patient were to develop symptoms, they should immediately self isolate and call our office for further guidance  Reviewed household contact protocol -- encouraged to quarantine until girlfriend's isolation period is complete and then quarantine for an additional 7 days with a negative test on Day 5  Can be tested earlier if symptoms develop  Pt defers work note  I have spent 8 minutes directly with the patient  Encounter provider Carla Philippe DO    Provider located at [address]  [address] [place] [id number]    Recent Visits  No visits were found meeting these conditions  Showing recent visits within past 7 days and meeting all other requirements     Today's Visits  Date Type Provider Dept   04/09/21 Telemedicine Brigida Johnson DO [facility] [place]    Showing today's visits and meeting all other requirements     Future Appointments  No visits were found meeting these conditions  Showing future appointments within next 150 days and meeting all other requirements        Patient agrees to participate in a virtual check in via telephone or video visit instead of presenting to the office to address urgent/immediate medical needs  Patient is aware this is a billable service  After connecting through Telephone, the patient was identified by name and date of birth   Katelynn Mcgill was informed that this was a telemedicine visit and that the exam was being conducted confidentially over secure lines  My office door was closed  No one else was in the room  Tang Ma acknowledged consent and understanding of privacy and security of the telemedicine visit  I informed the patient that I have reviewed his record in Epic and presented the opportunity for him to ask any questions regarding the visit today  The patient agreed to participate  It was my intent to perform this visit via video technology but the patient was not able to do a video connection so the visit was completed via audio telephone only  Subjective:   Tang Ma is a 48 y o  male who is concerned about COVID-19  Patient denies fever, chills, fatigue, malaise, congestion, rhinorrhea (a little bit -- think's it is allergies), sore throat, anosmia, loss of taste, cough (does have a smoker's cough), shortness of breath, chest tightness, abdominal pain, nausea, vomiting, diarrhea, myalgias and headaches       Date of exposure: 4/9/2021    Exposure:   Contact with a person who is under investigation (PUI) for or who is positive for COVID-19 within the last 14 days?: Yes    Hospitalized recently for fever and/or lower respiratory symptoms?: No      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 (this may include long-term care, correctional and penitentiary facilities; homeless shelters; assisted-living facilities and group homes ): No      Resident in a special setting where the risk of COVID-19 transmission may be high? (this may include long-term care, correctional and penitentiary facilities; homeless shelters; assisted-living facilities and group homes ): No      Girlfriend is COVID(+)     No results found for: TERENCE [address]  Past Medical History:   Diagnosis Date    Chest pain 9/25/2014    Eczema     Hidradenitis suppurativa 2/14/2018    Rib pain 1/28/2015     No past surgical history on file  Current Outpatient Medications   Medication Sig Dispense Refill    atorvastatin (LIPITOR) 10 mg tablet Take 1 tablet (10 mg total) by mouth daily 30 tablet 2    halobetasol (ULTRAVATE) 0 05 % cream Apply topically once for 1 dose 200 g 5    nicotine polacrilex (COMMIT) 4 MG lozenge Apply 1 lozenge (4 mg total) to the mouth or throat as needed for smoking cessation 100 each 1     No current facility-administered medications for this visit  No Known Allergies    Review of Systems   Constitutional: Negative for chills, fatigue and fever  HENT: Negative for congestion, rhinorrhea (a little bit -- think's it is allergies) and sore throat  Respiratory: Negative for cough (does have a smoker's cough), chest tightness and shortness of breath  Gastrointestinal: Negative for abdominal pain, diarrhea, nausea and vomiting  Musculoskeletal: Negative for myalgias  Neurological: Negative for headaches  Objective: There were no vitals filed for this visit  Physical Exam   No PE due to telephone only exam, no respiratory distress during     VIRTUAL VISIT DISCLAIMER    Cammie Shoemaker acknowledges that he has consented to an online visit or consultation  He understands that the online visit is based solely on information provided by him, and that, in the absence of a face-to-face physical evaluation by the physician, the diagnosis he receives is both limited and provisional in terms of accuracy and completeness  This is not intended to replace a full medical face-to-face evaluation by the physician  Cammie Shoemaker understands and accepts these terms

## 2021-04-12 ENCOUNTER — TELEPHONE (OUTPATIENT)
Dept: FAMILY MEDICINE CLINIC | Facility: CLINIC | Age: 51
End: 2021-04-12

## 2021-04-12 DIAGNOSIS — Z20.822 EXPOSURE TO COVID-19 VIRUS: Primary | ICD-10-CM

## 2021-04-12 DIAGNOSIS — Z20.822 EXPOSURE TO COVID-19 VIRUS: ICD-10-CM

## 2021-04-12 PROCEDURE — U0005 INFEC AGEN DETEC AMPLI PROBE: HCPCS | Performed by: FAMILY MEDICINE

## 2021-04-12 PROCEDURE — U0003 INFECTIOUS AGENT DETECTION BY NUCLEIC ACID (DNA OR RNA); SEVERE ACUTE RESPIRATORY SYNDROME CORONAVIRUS 2 (SARS-COV-2) (CORONAVIRUS DISEASE [COVID-19]), AMPLIFIED PROBE TECHNIQUE, MAKING USE OF HIGH THROUGHPUT TECHNOLOGIES AS DESCRIBED BY CMS-2020-01-R: HCPCS | Performed by: FAMILY MEDICINE

## 2021-04-12 NOTE — TELEPHONE ENCOUNTER
Please let pt know his COVID test came back as inconclusive, so he will need to have the test collected again -- I put another order in for him  Thanks!

## 2021-04-13 LAB — SARS-COV-2 RNA RESP QL NAA+PROBE: NEGATIVE

## 2021-04-14 DIAGNOSIS — Z20.822 EXPOSURE TO COVID-19 VIRUS: Primary | ICD-10-CM

## 2021-04-26 ENCOUNTER — IMMUNIZATIONS (OUTPATIENT)
Dept: FAMILY MEDICINE CLINIC | Facility: HOSPITAL | Age: 51
End: 2021-04-26

## 2021-04-26 DIAGNOSIS — Z23 ENCOUNTER FOR IMMUNIZATION: Primary | ICD-10-CM

## 2021-04-26 PROCEDURE — 91300 SARS-COV-2 / COVID-19 MRNA VACCINE (PFIZER-BIONTECH) 30 MCG: CPT

## 2021-04-26 PROCEDURE — 0002A SARS-COV-2 / COVID-19 MRNA VACCINE (PFIZER-BIONTECH) 30 MCG: CPT

## 2021-06-17 ENCOUNTER — TELEPHONE (OUTPATIENT)
Dept: FAMILY MEDICINE CLINIC | Facility: CLINIC | Age: 51
End: 2021-06-17

## 2021-06-18 NOTE — TELEPHONE ENCOUNTER
BEE- spoke with patient mom who is concerned about his drinking as well as leg swelling and abdominal weight gain

## 2021-11-16 ENCOUNTER — OFFICE VISIT (OUTPATIENT)
Dept: FAMILY MEDICINE CLINIC | Facility: CLINIC | Age: 51
End: 2021-11-16
Payer: COMMERCIAL

## 2021-11-16 VITALS
OXYGEN SATURATION: 95 % | SYSTOLIC BLOOD PRESSURE: 138 MMHG | BODY MASS INDEX: 35.16 KG/M2 | DIASTOLIC BLOOD PRESSURE: 86 MMHG | HEART RATE: 85 BPM | WEIGHT: 274 LBS | TEMPERATURE: 98.4 F | HEIGHT: 74 IN

## 2021-11-16 DIAGNOSIS — F17.200 SMOKER: ICD-10-CM

## 2021-11-16 DIAGNOSIS — R73.01 IMPAIRED FASTING GLUCOSE: ICD-10-CM

## 2021-11-16 DIAGNOSIS — E78.2 MIXED HYPERLIPIDEMIA: ICD-10-CM

## 2021-11-16 DIAGNOSIS — L73.2 HYDRADENITIS: Primary | ICD-10-CM

## 2021-11-16 DIAGNOSIS — R05.3 CHRONIC COUGH: ICD-10-CM

## 2021-11-16 DIAGNOSIS — Z12.12 SCREENING FOR COLORECTAL CANCER: ICD-10-CM

## 2021-11-16 DIAGNOSIS — Z12.11 SCREENING FOR COLON CANCER: ICD-10-CM

## 2021-11-16 DIAGNOSIS — Z23 ENCOUNTER FOR IMMUNIZATION: ICD-10-CM

## 2021-11-16 DIAGNOSIS — Z12.11 SCREENING FOR COLORECTAL CANCER: ICD-10-CM

## 2021-11-16 DIAGNOSIS — Z12.5 SCREENING FOR PROSTATE CANCER: ICD-10-CM

## 2021-11-16 PROBLEM — I10 ESSENTIAL HYPERTENSION: Status: RESOLVED | Noted: 2020-06-01 | Resolved: 2021-11-16

## 2021-11-16 PROCEDURE — 99214 OFFICE O/P EST MOD 30 MIN: CPT | Performed by: FAMILY MEDICINE

## 2021-11-16 PROCEDURE — 90682 RIV4 VACC RECOMBINANT DNA IM: CPT | Performed by: FAMILY MEDICINE

## 2021-11-16 PROCEDURE — 90471 IMMUNIZATION ADMIN: CPT | Performed by: FAMILY MEDICINE

## 2021-11-16 PROCEDURE — 3008F BODY MASS INDEX DOCD: CPT | Performed by: FAMILY MEDICINE

## 2021-11-16 RX ORDER — POLYETHYLENE GLYCOL 3350 17 G
4 POWDER IN PACKET (EA) ORAL AS NEEDED
Qty: 100 EACH | Refills: 1 | Status: SHIPPED | OUTPATIENT
Start: 2021-11-16 | End: 2022-06-28

## 2021-12-16 ENCOUNTER — APPOINTMENT (OUTPATIENT)
Dept: LAB | Facility: MEDICAL CENTER | Age: 51
End: 2021-12-16
Payer: COMMERCIAL

## 2021-12-16 DIAGNOSIS — Z12.5 SCREENING FOR PROSTATE CANCER: ICD-10-CM

## 2021-12-16 DIAGNOSIS — R73.01 IMPAIRED FASTING GLUCOSE: ICD-10-CM

## 2021-12-16 DIAGNOSIS — E78.2 MIXED HYPERLIPIDEMIA: ICD-10-CM

## 2021-12-16 LAB
ALBUMIN SERPL BCP-MCNC: 3.5 G/DL (ref 3.5–5)
ALP SERPL-CCNC: 78 U/L (ref 46–116)
ALT SERPL W P-5'-P-CCNC: 31 U/L (ref 12–78)
ANION GAP SERPL CALCULATED.3IONS-SCNC: 5 MMOL/L (ref 4–13)
AST SERPL W P-5'-P-CCNC: 15 U/L (ref 5–45)
BILIRUB SERPL-MCNC: 0.5 MG/DL (ref 0.2–1)
BUN SERPL-MCNC: 16 MG/DL (ref 5–25)
CALCIUM SERPL-MCNC: 9.3 MG/DL (ref 8.3–10.1)
CHLORIDE SERPL-SCNC: 110 MMOL/L (ref 100–108)
CHOLEST SERPL-MCNC: 174 MG/DL
CO2 SERPL-SCNC: 24 MMOL/L (ref 21–32)
CREAT SERPL-MCNC: 1.17 MG/DL (ref 0.6–1.3)
EST. AVERAGE GLUCOSE BLD GHB EST-MCNC: 120 MG/DL
GFR SERPL CREATININE-BSD FRML MDRD: 71 ML/MIN/1.73SQ M
GLUCOSE P FAST SERPL-MCNC: 101 MG/DL (ref 65–99)
HBA1C MFR BLD: 5.8 %
HDLC SERPL-MCNC: 36 MG/DL
LDLC SERPL CALC-MCNC: 120 MG/DL (ref 0–100)
NONHDLC SERPL-MCNC: 138 MG/DL
POTASSIUM SERPL-SCNC: 4.1 MMOL/L (ref 3.5–5.3)
PROT SERPL-MCNC: 7.5 G/DL (ref 6.4–8.2)
PSA SERPL-MCNC: 0.9 NG/ML (ref 0–4)
SODIUM SERPL-SCNC: 139 MMOL/L (ref 136–145)
TRIGL SERPL-MCNC: 88 MG/DL

## 2021-12-16 PROCEDURE — 80061 LIPID PANEL: CPT

## 2021-12-16 PROCEDURE — 83036 HEMOGLOBIN GLYCOSYLATED A1C: CPT

## 2021-12-16 PROCEDURE — G0103 PSA SCREENING: HCPCS

## 2021-12-16 PROCEDURE — 36415 COLL VENOUS BLD VENIPUNCTURE: CPT

## 2021-12-16 PROCEDURE — 80053 COMPREHEN METABOLIC PANEL: CPT

## 2022-06-20 ENCOUNTER — TELEPHONE (OUTPATIENT)
Dept: FAMILY MEDICINE CLINIC | Facility: CLINIC | Age: 52
End: 2022-06-20

## 2022-06-20 NOTE — TELEPHONE ENCOUNTER
Pt called to make an appt  He said last night (& several nights before) he had trouble breathing  He woke up last night gasping for air  I advised him to go to the ER  Pt said he took is pulse 150 and oxegen level was around 97   Pt said he would go to ER

## 2022-06-21 ENCOUNTER — HOSPITAL ENCOUNTER (INPATIENT)
Facility: HOSPITAL | Age: 52
LOS: 7 days | DRG: 286 | End: 2022-06-28
Attending: EMERGENCY MEDICINE | Admitting: INTERNAL MEDICINE
Payer: COMMERCIAL

## 2022-06-21 ENCOUNTER — APPOINTMENT (EMERGENCY)
Dept: RADIOLOGY | Facility: HOSPITAL | Age: 52
DRG: 286 | End: 2022-06-21
Payer: COMMERCIAL

## 2022-06-21 ENCOUNTER — APPOINTMENT (EMERGENCY)
Dept: CT IMAGING | Facility: HOSPITAL | Age: 52
DRG: 286 | End: 2022-06-21
Payer: COMMERCIAL

## 2022-06-21 DIAGNOSIS — N17.9 AKI (ACUTE KIDNEY INJURY) (HCC): ICD-10-CM

## 2022-06-21 DIAGNOSIS — I42.9 CARDIOMYOPATHY, UNSPECIFIED TYPE (HCC): ICD-10-CM

## 2022-06-21 DIAGNOSIS — I50.9 CHF (CONGESTIVE HEART FAILURE) (HCC): ICD-10-CM

## 2022-06-21 DIAGNOSIS — I48.91 AF (ATRIAL FIBRILLATION) (HCC): Primary | ICD-10-CM

## 2022-06-21 LAB
2HR DELTA HS TROPONIN: 4 NG/L
ALBUMIN SERPL BCP-MCNC: 3.5 G/DL (ref 3.5–5)
ALP SERPL-CCNC: 75 U/L (ref 46–116)
ALT SERPL W P-5'-P-CCNC: 64 U/L (ref 12–78)
ANION GAP SERPL CALCULATED.3IONS-SCNC: 12 MMOL/L (ref 4–13)
AST SERPL W P-5'-P-CCNC: 20 U/L (ref 5–45)
BASE EX.OXY STD BLDV CALC-SCNC: 54.2 % (ref 60–80)
BASE EXCESS BLDV CALC-SCNC: -2.4 MMOL/L
BASOPHILS # BLD AUTO: 0.1 THOUSANDS/ΜL (ref 0–0.1)
BASOPHILS NFR BLD AUTO: 1 % (ref 0–1)
BILIRUB SERPL-MCNC: 0.59 MG/DL (ref 0.2–1)
BUN SERPL-MCNC: 17 MG/DL (ref 5–25)
CALCIUM SERPL-MCNC: 9.2 MG/DL (ref 8.3–10.1)
CARDIAC TROPONIN I PNL SERPL HS: 23 NG/L
CARDIAC TROPONIN I PNL SERPL HS: 27 NG/L
CHLORIDE SERPL-SCNC: 106 MMOL/L (ref 100–108)
CO2 SERPL-SCNC: 24 MMOL/L (ref 21–32)
CREAT SERPL-MCNC: 1.56 MG/DL (ref 0.6–1.3)
D DIMER PPP FEU-MCNC: 1.56 UG/ML FEU
EOSINOPHIL # BLD AUTO: 0.09 THOUSAND/ΜL (ref 0–0.61)
EOSINOPHIL NFR BLD AUTO: 1 % (ref 0–6)
ERYTHROCYTE [DISTWIDTH] IN BLOOD BY AUTOMATED COUNT: 13.5 % (ref 11.6–15.1)
GFR SERPL CREATININE-BSD FRML MDRD: 50 ML/MIN/1.73SQ M
GLUCOSE SERPL-MCNC: 97 MG/DL (ref 65–140)
HCO3 BLDV-SCNC: 22.6 MMOL/L (ref 24–30)
HCT VFR BLD AUTO: 42.2 % (ref 36.5–49.3)
HGB BLD-MCNC: 14 G/DL (ref 12–17)
IMM GRANULOCYTES # BLD AUTO: 0.06 THOUSAND/UL (ref 0–0.2)
IMM GRANULOCYTES NFR BLD AUTO: 1 % (ref 0–2)
INR PPP: 1.07 (ref 0.84–1.19)
LACTATE SERPL-SCNC: 1.8 MMOL/L (ref 0.5–2)
LYMPHOCYTES # BLD AUTO: 2.69 THOUSANDS/ΜL (ref 0.6–4.47)
LYMPHOCYTES NFR BLD AUTO: 26 % (ref 14–44)
MCH RBC QN AUTO: 30.8 PG (ref 26.8–34.3)
MCHC RBC AUTO-ENTMCNC: 33.2 G/DL (ref 31.4–37.4)
MCV RBC AUTO: 93 FL (ref 82–98)
MONOCYTES # BLD AUTO: 1 THOUSAND/ΜL (ref 0.17–1.22)
MONOCYTES NFR BLD AUTO: 10 % (ref 4–12)
NEUTROPHILS # BLD AUTO: 6.3 THOUSANDS/ΜL (ref 1.85–7.62)
NEUTS SEG NFR BLD AUTO: 61 % (ref 43–75)
NRBC BLD AUTO-RTO: 0 /100 WBCS
O2 CT BLDV-SCNC: 11.1 ML/DL
PCO2 BLDV: 39.9 MM HG (ref 42–50)
PH BLDV: 7.37 [PH] (ref 7.3–7.4)
PLATELET # BLD AUTO: 236 THOUSANDS/UL (ref 149–390)
PMV BLD AUTO: 12.8 FL (ref 8.9–12.7)
PO2 BLDV: 31.3 MM HG (ref 35–45)
POTASSIUM SERPL-SCNC: 3.9 MMOL/L (ref 3.5–5.3)
PROT SERPL-MCNC: 7.9 G/DL (ref 6.4–8.2)
PROTHROMBIN TIME: 13.5 SECONDS (ref 11.6–14.5)
RBC # BLD AUTO: 4.54 MILLION/UL (ref 3.88–5.62)
SODIUM SERPL-SCNC: 142 MMOL/L (ref 136–145)
TSH SERPL DL<=0.05 MIU/L-ACNC: 1.05 UIU/ML (ref 0.45–4.5)
WBC # BLD AUTO: 10.24 THOUSAND/UL (ref 4.31–10.16)

## 2022-06-21 PROCEDURE — 84443 ASSAY THYROID STIM HORMONE: CPT | Performed by: EMERGENCY MEDICINE

## 2022-06-21 PROCEDURE — 80053 COMPREHEN METABOLIC PANEL: CPT | Performed by: EMERGENCY MEDICINE

## 2022-06-21 PROCEDURE — 85379 FIBRIN DEGRADATION QUANT: CPT | Performed by: EMERGENCY MEDICINE

## 2022-06-21 PROCEDURE — 99291 CRITICAL CARE FIRST HOUR: CPT | Performed by: EMERGENCY MEDICINE

## 2022-06-21 PROCEDURE — 93005 ELECTROCARDIOGRAM TRACING: CPT

## 2022-06-21 PROCEDURE — 83605 ASSAY OF LACTIC ACID: CPT | Performed by: EMERGENCY MEDICINE

## 2022-06-21 PROCEDURE — 96365 THER/PROPH/DIAG IV INF INIT: CPT

## 2022-06-21 PROCEDURE — 96366 THER/PROPH/DIAG IV INF ADDON: CPT

## 2022-06-21 PROCEDURE — 36415 COLL VENOUS BLD VENIPUNCTURE: CPT | Performed by: EMERGENCY MEDICINE

## 2022-06-21 PROCEDURE — 83735 ASSAY OF MAGNESIUM: CPT

## 2022-06-21 PROCEDURE — 82805 BLOOD GASES W/O2 SATURATION: CPT | Performed by: EMERGENCY MEDICINE

## 2022-06-21 PROCEDURE — 83880 ASSAY OF NATRIURETIC PEPTIDE: CPT

## 2022-06-21 PROCEDURE — 99285 EMERGENCY DEPT VISIT HI MDM: CPT

## 2022-06-21 PROCEDURE — 96374 THER/PROPH/DIAG INJ IV PUSH: CPT

## 2022-06-21 PROCEDURE — 96375 TX/PRO/DX INJ NEW DRUG ADDON: CPT

## 2022-06-21 PROCEDURE — 85610 PROTHROMBIN TIME: CPT | Performed by: EMERGENCY MEDICINE

## 2022-06-21 PROCEDURE — 84484 ASSAY OF TROPONIN QUANT: CPT | Performed by: EMERGENCY MEDICINE

## 2022-06-21 PROCEDURE — 85025 COMPLETE CBC W/AUTO DIFF WBC: CPT | Performed by: EMERGENCY MEDICINE

## 2022-06-21 PROCEDURE — 71045 X-RAY EXAM CHEST 1 VIEW: CPT

## 2022-06-21 RX ORDER — FUROSEMIDE 10 MG/ML
40 INJECTION INTRAMUSCULAR; INTRAVENOUS ONCE
Status: COMPLETED | OUTPATIENT
Start: 2022-06-21 | End: 2022-06-21

## 2022-06-21 RX ORDER — HEPARIN SODIUM 1000 [USP'U]/ML
2000 INJECTION, SOLUTION INTRAVENOUS; SUBCUTANEOUS
Status: DISCONTINUED | OUTPATIENT
Start: 2022-06-21 | End: 2022-06-28 | Stop reason: HOSPADM

## 2022-06-21 RX ORDER — HEPARIN SODIUM 1000 [USP'U]/ML
4000 INJECTION, SOLUTION INTRAVENOUS; SUBCUTANEOUS ONCE
Status: COMPLETED | OUTPATIENT
Start: 2022-06-21 | End: 2022-06-21

## 2022-06-21 RX ORDER — DILTIAZEM HYDROCHLORIDE 5 MG/ML
10 INJECTION INTRAVENOUS ONCE
Status: COMPLETED | OUTPATIENT
Start: 2022-06-21 | End: 2022-06-21

## 2022-06-21 RX ORDER — MAGNESIUM SULFATE HEPTAHYDRATE 40 MG/ML
2 INJECTION, SOLUTION INTRAVENOUS ONCE
Status: COMPLETED | OUTPATIENT
Start: 2022-06-21 | End: 2022-06-22

## 2022-06-21 RX ORDER — METOPROLOL TARTRATE 5 MG/5ML
5 INJECTION INTRAVENOUS ONCE
Status: COMPLETED | OUTPATIENT
Start: 2022-06-21 | End: 2022-06-21

## 2022-06-21 RX ORDER — HEPARIN SODIUM 1000 [USP'U]/ML
4000 INJECTION, SOLUTION INTRAVENOUS; SUBCUTANEOUS
Status: DISCONTINUED | OUTPATIENT
Start: 2022-06-21 | End: 2022-06-28 | Stop reason: HOSPADM

## 2022-06-21 RX ORDER — HEPARIN SODIUM 10000 [USP'U]/100ML
3-20 INJECTION, SOLUTION INTRAVENOUS
Status: DISCONTINUED | OUTPATIENT
Start: 2022-06-21 | End: 2022-06-28 | Stop reason: HOSPADM

## 2022-06-21 RX ADMIN — FUROSEMIDE 40 MG: 10 INJECTION, SOLUTION INTRAMUSCULAR; INTRAVENOUS at 23:43

## 2022-06-21 RX ADMIN — DILTIAZEM HYDROCHLORIDE 10 MG: 5 INJECTION INTRAVENOUS at 20:55

## 2022-06-21 RX ADMIN — DILTIAZEM HYDROCHLORIDE 5 MG/HR: 5 INJECTION, SOLUTION INTRAVENOUS at 21:45

## 2022-06-21 RX ADMIN — HEPARIN SODIUM AND DEXTROSE 11.1 UNITS/KG/HR: 10000; 5 INJECTION INTRAVENOUS at 21:15

## 2022-06-21 RX ADMIN — HEPARIN SODIUM 4000 UNITS: 1000 INJECTION INTRAVENOUS; SUBCUTANEOUS at 21:15

## 2022-06-21 RX ADMIN — SODIUM CHLORIDE 1000 ML: 0.9 INJECTION, SOLUTION INTRAVENOUS at 20:59

## 2022-06-21 RX ADMIN — METOPROLOL TARTRATE 5 MG: 5 INJECTION INTRAVENOUS at 23:27

## 2022-06-21 NOTE — Clinical Note
Defib pad site: anterior/posterior and left lower flank  Defib pad site: right anterior chest Defib pad site assessment: skin integrity intact

## 2022-06-22 ENCOUNTER — APPOINTMENT (INPATIENT)
Dept: NON INVASIVE DIAGNOSTICS | Facility: HOSPITAL | Age: 52
DRG: 286 | End: 2022-06-22
Payer: COMMERCIAL

## 2022-06-22 ENCOUNTER — APPOINTMENT (INPATIENT)
Dept: CT IMAGING | Facility: HOSPITAL | Age: 52
DRG: 286 | End: 2022-06-22
Payer: COMMERCIAL

## 2022-06-22 ENCOUNTER — APPOINTMENT (INPATIENT)
Dept: RADIOLOGY | Facility: HOSPITAL | Age: 52
DRG: 286 | End: 2022-06-22
Payer: COMMERCIAL

## 2022-06-22 PROBLEM — N17.9 AKI (ACUTE KIDNEY INJURY) (HCC): Status: ACTIVE | Noted: 2022-06-22

## 2022-06-22 PROBLEM — E78.5 HYPERLIPIDEMIA: Status: ACTIVE | Noted: 2022-06-22

## 2022-06-22 PROBLEM — J18.9 PNA (PNEUMONIA): Status: ACTIVE | Noted: 2022-06-22

## 2022-06-22 PROBLEM — I50.9 CHF (CONGESTIVE HEART FAILURE) (HCC): Status: ACTIVE | Noted: 2022-06-22

## 2022-06-22 LAB
2HR DELTA HS TROPONIN: 9 NG/L
4HR DELTA HS TROPONIN: 0 NG/L
4HR DELTA HS TROPONIN: 2 NG/L
ALBUMIN SERPL BCP-MCNC: 3.6 G/DL (ref 3.5–5)
ALP SERPL-CCNC: 70 U/L (ref 46–116)
ALT SERPL W P-5'-P-CCNC: 72 U/L (ref 12–78)
ANION GAP SERPL CALCULATED.3IONS-SCNC: 16 MMOL/L (ref 4–13)
ANION GAP SERPL CALCULATED.3IONS-SCNC: 17 MMOL/L (ref 4–13)
ANION GAP SERPL CALCULATED.3IONS-SCNC: 20 MMOL/L (ref 4–13)
AORTIC ROOT: 3.8 CM
APICAL FOUR CHAMBER EJECTION FRACTION: 36 %
APTT PPP: 25 SECONDS (ref 23–37)
APTT PPP: 30 SECONDS (ref 23–37)
APTT PPP: 41 SECONDS (ref 23–37)
ARTERIAL PATENCY WRIST A: YES
ASCENDING AORTA: 3.9 CM
AST SERPL W P-5'-P-CCNC: 36 U/L (ref 5–45)
ATRIAL RATE: 105 BPM
BASE EXCESS BLDA CALC-SCNC: -10.8 MMOL/L
BASE EXCESS BLDA CALC-SCNC: -6.9 MMOL/L
BILIRUB DIRECT SERPL-MCNC: 0.19 MG/DL (ref 0–0.2)
BILIRUB SERPL-MCNC: 0.48 MG/DL (ref 0.2–1)
BILIRUB UR QL STRIP: NEGATIVE
BUN SERPL-MCNC: 21 MG/DL (ref 5–25)
CALCIUM SERPL-MCNC: 8.4 MG/DL (ref 8.3–10.1)
CALCIUM SERPL-MCNC: 8.6 MG/DL (ref 8.3–10.1)
CALCIUM SERPL-MCNC: 8.7 MG/DL (ref 8.3–10.1)
CARDIAC TROPONIN I PNL SERPL HS: 23 NG/L
CARDIAC TROPONIN I PNL SERPL HS: 23 NG/L
CARDIAC TROPONIN I PNL SERPL HS: 25 NG/L
CARDIAC TROPONIN I PNL SERPL HS: 32 NG/L
CHLORIDE SERPL-SCNC: 105 MMOL/L (ref 100–108)
CHLORIDE SERPL-SCNC: 105 MMOL/L (ref 100–108)
CHLORIDE SERPL-SCNC: 106 MMOL/L (ref 100–108)
CLARITY UR: CLEAR
CO2 SERPL-SCNC: 15 MMOL/L (ref 21–32)
CO2 SERPL-SCNC: 18 MMOL/L (ref 21–32)
CO2 SERPL-SCNC: 18 MMOL/L (ref 21–32)
COLOR UR: NORMAL
CREAT SERPL-MCNC: 1.51 MG/DL (ref 0.6–1.3)
CREAT SERPL-MCNC: 1.52 MG/DL (ref 0.6–1.3)
CREAT SERPL-MCNC: 1.87 MG/DL (ref 0.6–1.3)
ERYTHROCYTE [DISTWIDTH] IN BLOOD BY AUTOMATED COUNT: 13.9 % (ref 11.6–15.1)
EST. AVERAGE GLUCOSE BLD GHB EST-MCNC: 123 MG/DL
FRACTIONAL SHORTENING: 11 % (ref 28–44)
GFR SERPL CREATININE-BSD FRML MDRD: 40 ML/MIN/1.73SQ M
GFR SERPL CREATININE-BSD FRML MDRD: 52 ML/MIN/1.73SQ M
GFR SERPL CREATININE-BSD FRML MDRD: 52 ML/MIN/1.73SQ M
GLUCOSE SERPL-MCNC: 128 MG/DL (ref 65–140)
GLUCOSE SERPL-MCNC: 136 MG/DL (ref 65–140)
GLUCOSE SERPL-MCNC: 140 MG/DL (ref 65–140)
GLUCOSE SERPL-MCNC: 141 MG/DL (ref 65–140)
GLUCOSE SERPL-MCNC: 150 MG/DL (ref 65–140)
GLUCOSE SERPL-MCNC: 150 MG/DL (ref 65–140)
GLUCOSE SERPL-MCNC: 223 MG/DL (ref 65–140)
GLUCOSE SERPL-MCNC: 286 MG/DL (ref 65–140)
GLUCOSE UR STRIP-MCNC: NEGATIVE MG/DL
HBA1C MFR BLD: 5.9 %
HCO3 BLDA-SCNC: 12.6 MMOL/L (ref 22–28)
HCO3 BLDA-SCNC: 16.4 MMOL/L (ref 22–28)
HCT VFR BLD AUTO: 40.6 % (ref 36.5–49.3)
HGB BLD-MCNC: 13 G/DL (ref 12–17)
HGB UR QL STRIP.AUTO: NEGATIVE
INTERVENTRICULAR SEPTUM IN DIASTOLE (PARASTERNAL SHORT AXIS VIEW): 0.8 CM
INTERVENTRICULAR SEPTUM: 0.8 CM (ref 0.6–1.1)
KETONES UR STRIP-MCNC: NEGATIVE MG/DL
L PNEUMO1 AG UR QL IA.RAPID: NEGATIVE
LAAS-AP2: 24.3 CM2
LAAS-AP4: 23.3 CM2
LACTATE SERPL-SCNC: 0.7 MMOL/L (ref 0.5–2)
LACTATE SERPL-SCNC: 3.1 MMOL/L (ref 0.5–2)
LACTATE SERPL-SCNC: 4.8 MMOL/L (ref 0.5–2)
LEFT ATRIUM AREA SYSTOLE SINGLE PLANE A4C: 22.7 CM2
LEFT ATRIUM SIZE: 4.9 CM
LEFT INTERNAL DIMENSION IN SYSTOLE: 5.4 CM (ref 2.1–4)
LEFT VENTRICLE DIASTOLIC VOLUME (MOD BIPLANE): 214 ML
LEFT VENTRICLE SYSTOLIC VOLUME (MOD BIPLANE): 146 ML
LEFT VENTRICULAR INTERNAL DIMENSION IN DIASTOLE: 6.1 CM (ref 3.5–6)
LEFT VENTRICULAR POSTERIOR WALL IN END DIASTOLE: 1 CM
LEFT VENTRICULAR STROKE VOLUME: 51 ML
LEUKOCYTE ESTERASE UR QL STRIP: NEGATIVE
LIPASE SERPL-CCNC: 35 U/L (ref 73–393)
LV EF: 31 %
LVSV (TEICH): 51 ML
MAGNESIUM SERPL-MCNC: 2.4 MG/DL (ref 1.6–2.6)
MAGNESIUM SERPL-MCNC: 2.8 MG/DL (ref 1.6–2.6)
MCH RBC QN AUTO: 30.5 PG (ref 26.8–34.3)
MCHC RBC AUTO-ENTMCNC: 32 G/DL (ref 31.4–37.4)
MCV RBC AUTO: 95 FL (ref 82–98)
MV E'TISSUE VEL-SEP: 6 CM/S
MV EROA: 0.3 CM2
NITRITE UR QL STRIP: NEGATIVE
NT-PROBNP SERPL-MCNC: 2533 PG/ML
O2 CT BLDA-SCNC: 17.2 ML/DL (ref 16–23)
O2 CT BLDA-SCNC: 17.2 ML/DL (ref 16–23)
OXYHGB MFR BLDA: 93.8 % (ref 94–97)
OXYHGB MFR BLDA: 95 % (ref 94–97)
PCO2 BLDA: 22.7 MM HG (ref 36–44)
PCO2 BLDA: 27.1 MM HG (ref 36–44)
PH BLDA: 7.36 [PH] (ref 7.35–7.45)
PH BLDA: 7.4 [PH] (ref 7.35–7.45)
PH UR STRIP.AUTO: 6 [PH]
PHOSPHATE SERPL-MCNC: 5.1 MG/DL (ref 2.7–4.5)
PISA MRMAX VEL: 0.34 M/S
PISA RADIUS: 0.8 CM
PLATELET # BLD AUTO: 229 THOUSANDS/UL (ref 149–390)
PMV BLD AUTO: 13.2 FL (ref 8.9–12.7)
PO2 BLDA: 77.3 MM HG (ref 75–129)
PO2 BLDA: 82.8 MM HG (ref 75–129)
POTASSIUM SERPL-SCNC: 3.8 MMOL/L (ref 3.5–5.3)
POTASSIUM SERPL-SCNC: 4 MMOL/L (ref 3.5–5.3)
POTASSIUM SERPL-SCNC: 4.7 MMOL/L (ref 3.5–5.3)
PROCALCITONIN SERPL-MCNC: 0.07 NG/ML
PROT SERPL-MCNC: 7.7 G/DL (ref 6.4–8.2)
PROT UR STRIP-MCNC: NEGATIVE MG/DL
QRS AXIS: 96 DEGREES
QRSD INTERVAL: 90 MS
QT INTERVAL: 358 MS
QTC INTERVAL: 510 MS
RBC # BLD AUTO: 4.26 MILLION/UL (ref 3.88–5.62)
RIGHT ATRIUM AREA SYSTOLE A4C: 24.6 CM2
RIGHT VENTRICLE ID DIMENSION: 4.1 CM
S PNEUM AG UR QL: NEGATIVE
SL CV LEFT ATRIUM LENGTH A2C: 6.6 CM
SL CV PED ECHO LEFT VENTRICLE DIASTOLIC VOLUME (MOD BIPLANE) 2D: 189 ML
SL CV PED ECHO LEFT VENTRICLE SYSTOLIC VOLUME (MOD BIPLANE) 2D: 139 ML
SODIUM SERPL-SCNC: 140 MMOL/L (ref 136–145)
SP GR UR STRIP.AUTO: >=1.03 (ref 1–1.03)
SPECIMEN SOURCE: ABNORMAL
SPECIMEN SOURCE: ABNORMAL
T WAVE AXIS: 133 DEGREES
TR MAX PG: 38 MMHG
TR PEAK VELOCITY: 3.1 M/S
TRICUSPID VALVE PEAK REGURGITATION VELOCITY: 3.1 M/S
UROBILINOGEN UR QL STRIP.AUTO: 0.2 E.U./DL
VENTRICULAR RATE: 122 BPM
WBC # BLD AUTO: 10.7 THOUSAND/UL (ref 4.31–10.16)

## 2022-06-22 PROCEDURE — 84100 ASSAY OF PHOSPHORUS: CPT

## 2022-06-22 PROCEDURE — 82805 BLOOD GASES W/O2 SATURATION: CPT | Performed by: INTERNAL MEDICINE

## 2022-06-22 PROCEDURE — 84145 PROCALCITONIN (PCT): CPT

## 2022-06-22 PROCEDURE — 83036 HEMOGLOBIN GLYCOSYLATED A1C: CPT

## 2022-06-22 PROCEDURE — 36620 INSERTION CATHETER ARTERY: CPT | Performed by: INTERNAL MEDICINE

## 2022-06-22 PROCEDURE — 83690 ASSAY OF LIPASE: CPT | Performed by: NURSE PRACTITIONER

## 2022-06-22 PROCEDURE — NC001 PR NO CHARGE: Performed by: INTERNAL MEDICINE

## 2022-06-22 PROCEDURE — G1004 CDSM NDSC: HCPCS

## 2022-06-22 PROCEDURE — 71045 X-RAY EXAM CHEST 1 VIEW: CPT

## 2022-06-22 PROCEDURE — 84484 ASSAY OF TROPONIN QUANT: CPT | Performed by: EMERGENCY MEDICINE

## 2022-06-22 PROCEDURE — 71275 CT ANGIOGRAPHY CHEST: CPT

## 2022-06-22 PROCEDURE — 87040 BLOOD CULTURE FOR BACTERIA: CPT

## 2022-06-22 PROCEDURE — 93010 ELECTROCARDIOGRAM REPORT: CPT | Performed by: INTERNAL MEDICINE

## 2022-06-22 PROCEDURE — 82948 REAGENT STRIP/BLOOD GLUCOSE: CPT

## 2022-06-22 PROCEDURE — 81003 URINALYSIS AUTO W/O SCOPE: CPT

## 2022-06-22 PROCEDURE — 84484 ASSAY OF TROPONIN QUANT: CPT

## 2022-06-22 PROCEDURE — 85730 THROMBOPLASTIN TIME PARTIAL: CPT | Performed by: EMERGENCY MEDICINE

## 2022-06-22 PROCEDURE — 85027 COMPLETE CBC AUTOMATED: CPT

## 2022-06-22 PROCEDURE — 83605 ASSAY OF LACTIC ACID: CPT

## 2022-06-22 PROCEDURE — 80076 HEPATIC FUNCTION PANEL: CPT | Performed by: NURSE PRACTITIONER

## 2022-06-22 PROCEDURE — 93005 ELECTROCARDIOGRAM TRACING: CPT

## 2022-06-22 PROCEDURE — 80048 BASIC METABOLIC PNL TOTAL CA: CPT

## 2022-06-22 PROCEDURE — 74176 CT ABD & PELVIS W/O CONTRAST: CPT

## 2022-06-22 PROCEDURE — 82805 BLOOD GASES W/O2 SATURATION: CPT | Performed by: NURSE PRACTITIONER

## 2022-06-22 PROCEDURE — 03HY32Z INSERTION OF MONITORING DEVICE INTO UPPER ARTERY, PERCUTANEOUS APPROACH: ICD-10-PCS | Performed by: INTERNAL MEDICINE

## 2022-06-22 PROCEDURE — 99223 1ST HOSP IP/OBS HIGH 75: CPT | Performed by: INTERNAL MEDICINE

## 2022-06-22 PROCEDURE — 93306 TTE W/DOPPLER COMPLETE: CPT

## 2022-06-22 PROCEDURE — 87449 NOS EACH ORGANISM AG IA: CPT

## 2022-06-22 PROCEDURE — 83735 ASSAY OF MAGNESIUM: CPT

## 2022-06-22 PROCEDURE — 85730 THROMBOPLASTIN TIME PARTIAL: CPT

## 2022-06-22 PROCEDURE — 85730 THROMBOPLASTIN TIME PARTIAL: CPT | Performed by: INTERNAL MEDICINE

## 2022-06-22 PROCEDURE — 74018 RADEX ABDOMEN 1 VIEW: CPT

## 2022-06-22 PROCEDURE — NC001 PR NO CHARGE: Performed by: NURSE PRACTITIONER

## 2022-06-22 PROCEDURE — 93306 TTE W/DOPPLER COMPLETE: CPT | Performed by: INTERNAL MEDICINE

## 2022-06-22 RX ORDER — METOPROLOL TARTRATE 5 MG/5ML
2.5 INJECTION INTRAVENOUS ONCE
Status: COMPLETED | OUTPATIENT
Start: 2022-06-22 | End: 2022-06-22

## 2022-06-22 RX ORDER — FOLIC ACID 1 MG/1
1 TABLET ORAL DAILY
Status: DISCONTINUED | OUTPATIENT
Start: 2022-06-22 | End: 2022-06-28 | Stop reason: HOSPADM

## 2022-06-22 RX ORDER — METOPROLOL TARTRATE 5 MG/5ML
2.5 INJECTION INTRAVENOUS EVERY 6 HOURS PRN
Status: DISCONTINUED | OUTPATIENT
Start: 2022-06-22 | End: 2022-06-22

## 2022-06-22 RX ORDER — SODIUM CHLORIDE, SODIUM GLUCONATE, SODIUM ACETATE, POTASSIUM CHLORIDE, MAGNESIUM CHLORIDE, SODIUM PHOSPHATE, DIBASIC, AND POTASSIUM PHOSPHATE .53; .5; .37; .037; .03; .012; .00082 G/100ML; G/100ML; G/100ML; G/100ML; G/100ML; G/100ML; G/100ML
500 INJECTION, SOLUTION INTRAVENOUS ONCE
Status: DISCONTINUED | OUTPATIENT
Start: 2022-06-22 | End: 2022-06-22

## 2022-06-22 RX ORDER — LIDOCAINE HYDROCHLORIDE 10 MG/ML
5 INJECTION, SOLUTION EPIDURAL; INFILTRATION; INTRACAUDAL; PERINEURAL ONCE
Status: DISCONTINUED | OUTPATIENT
Start: 2022-06-22 | End: 2022-06-22

## 2022-06-22 RX ORDER — ATORVASTATIN CALCIUM 10 MG/1
10 TABLET, FILM COATED ORAL
Status: DISCONTINUED | OUTPATIENT
Start: 2022-06-22 | End: 2022-06-27

## 2022-06-22 RX ORDER — INSULIN LISPRO 100 [IU]/ML
1-5 INJECTION, SOLUTION INTRAVENOUS; SUBCUTANEOUS
Status: DISCONTINUED | OUTPATIENT
Start: 2022-06-22 | End: 2022-06-28 | Stop reason: HOSPADM

## 2022-06-22 RX ORDER — METOPROLOL TARTRATE 5 MG/5ML
5 INJECTION INTRAVENOUS ONCE
Status: COMPLETED | OUTPATIENT
Start: 2022-06-22 | End: 2022-06-22

## 2022-06-22 RX ORDER — DIGOXIN 0.25 MG/ML
250 INJECTION INTRAMUSCULAR; INTRAVENOUS EVERY 6 HOURS
Status: COMPLETED | OUTPATIENT
Start: 2022-06-22 | End: 2022-06-23

## 2022-06-22 RX ORDER — METOPROLOL TARTRATE 5 MG/5ML
5 INJECTION INTRAVENOUS EVERY 6 HOURS PRN
Status: DISCONTINUED | OUTPATIENT
Start: 2022-06-22 | End: 2022-06-22

## 2022-06-22 RX ORDER — AZITHROMYCIN 250 MG/1
250 TABLET, FILM COATED ORAL EVERY 24 HOURS
Status: DISCONTINUED | OUTPATIENT
Start: 2022-06-23 | End: 2022-06-22

## 2022-06-22 RX ORDER — LANOLIN ALCOHOL/MO/W.PET/CERES
100 CREAM (GRAM) TOPICAL DAILY
Status: DISCONTINUED | OUTPATIENT
Start: 2022-06-22 | End: 2022-06-28 | Stop reason: HOSPADM

## 2022-06-22 RX ORDER — LORAZEPAM 2 MG/ML
2 INJECTION INTRAMUSCULAR ONCE
Status: COMPLETED | OUTPATIENT
Start: 2022-06-22 | End: 2022-06-22

## 2022-06-22 RX ORDER — MAGNESIUM SULFATE HEPTAHYDRATE 40 MG/ML
2 INJECTION, SOLUTION INTRAVENOUS ONCE
Status: COMPLETED | OUTPATIENT
Start: 2022-06-22 | End: 2022-06-22

## 2022-06-22 RX ORDER — ALBUMIN (HUMAN) 12.5 G/50ML
25 SOLUTION INTRAVENOUS ONCE
Status: COMPLETED | OUTPATIENT
Start: 2022-06-22 | End: 2022-06-22

## 2022-06-22 RX ORDER — FUROSEMIDE 10 MG/ML
60 INJECTION INTRAMUSCULAR; INTRAVENOUS ONCE
Status: COMPLETED | OUTPATIENT
Start: 2022-06-22 | End: 2022-06-22

## 2022-06-22 RX ADMIN — HEPARIN SODIUM 4000 UNITS: 1000 INJECTION INTRAVENOUS; SUBCUTANEOUS at 21:03

## 2022-06-22 RX ADMIN — INSULIN LISPRO 1 UNITS: 100 INJECTION, SOLUTION INTRAVENOUS; SUBCUTANEOUS at 12:23

## 2022-06-22 RX ADMIN — MAGNESIUM SULFATE HEPTAHYDRATE 2 G: 40 INJECTION, SOLUTION INTRAVENOUS at 05:29

## 2022-06-22 RX ADMIN — AZITHROMYCIN MONOHYDRATE 500 MG: 500 INJECTION, POWDER, LYOPHILIZED, FOR SOLUTION INTRAVENOUS at 04:20

## 2022-06-22 RX ADMIN — LORAZEPAM 2 MG: 2 INJECTION INTRAMUSCULAR; INTRAVENOUS at 14:55

## 2022-06-22 RX ADMIN — IOHEXOL 70 ML: 350 INJECTION, SOLUTION INTRAVENOUS at 01:14

## 2022-06-22 RX ADMIN — MAGNESIUM SULFATE HEPTAHYDRATE 2 G: 40 INJECTION, SOLUTION INTRAVENOUS at 00:31

## 2022-06-22 RX ADMIN — AMIODARONE HYDROCHLORIDE 1 MG/MIN: 50 INJECTION, SOLUTION INTRAVENOUS at 10:26

## 2022-06-22 RX ADMIN — METOROPROLOL TARTRATE 2.5 MG: 5 INJECTION, SOLUTION INTRAVENOUS at 03:19

## 2022-06-22 RX ADMIN — METOROPROLOL TARTRATE 5 MG: 5 INJECTION, SOLUTION INTRAVENOUS at 22:45

## 2022-06-22 RX ADMIN — FOLIC ACID 1 MG: 1 TABLET ORAL at 08:50

## 2022-06-22 RX ADMIN — DEXTROSE 150 MG: 50 INJECTION, SOLUTION INTRAVENOUS at 04:07

## 2022-06-22 RX ADMIN — THIAMINE HCL TAB 100 MG 100 MG: 100 TAB at 08:50

## 2022-06-22 RX ADMIN — HEPARIN SODIUM 4000 UNITS: 1000 INJECTION INTRAVENOUS; SUBCUTANEOUS at 11:37

## 2022-06-22 RX ADMIN — Medication 1 TABLET: at 08:50

## 2022-06-22 RX ADMIN — HEPARIN SODIUM AND DEXTROSE 15.1 UNITS/KG/HR: 10000; 5 INJECTION INTRAVENOUS at 18:03

## 2022-06-22 RX ADMIN — METOROPROLOL TARTRATE 2.5 MG: 5 INJECTION, SOLUTION INTRAVENOUS at 03:50

## 2022-06-22 RX ADMIN — AMIODARONE HYDROCHLORIDE 1 MG/MIN: 50 INJECTION, SOLUTION INTRAVENOUS at 11:10

## 2022-06-22 RX ADMIN — METOROPROLOL TARTRATE 2.5 MG: 5 INJECTION, SOLUTION INTRAVENOUS at 21:29

## 2022-06-22 RX ADMIN — METOROPROLOL TARTRATE 2.5 MG: 5 INJECTION, SOLUTION INTRAVENOUS at 03:00

## 2022-06-22 RX ADMIN — ATORVASTATIN CALCIUM 10 MG: 10 TABLET, FILM COATED ORAL at 18:02

## 2022-06-22 RX ADMIN — NOREPINEPHRINE BITARTRATE 5 MCG/MIN: 1 INJECTION, SOLUTION, CONCENTRATE INTRAVENOUS at 05:18

## 2022-06-22 RX ADMIN — LORAZEPAM 2 MG: 2 INJECTION INTRAMUSCULAR; INTRAVENOUS at 10:46

## 2022-06-22 RX ADMIN — INSULIN LISPRO 2 UNITS: 100 INJECTION, SOLUTION INTRAVENOUS; SUBCUTANEOUS at 08:50

## 2022-06-22 RX ADMIN — DIGOXIN 250 MCG: 0.25 INJECTION INTRAMUSCULAR; INTRAVENOUS at 18:04

## 2022-06-22 RX ADMIN — AMIODARONE HYDROCHLORIDE 0.5 MG/MIN: 50 INJECTION, SOLUTION INTRAVENOUS at 18:02

## 2022-06-22 RX ADMIN — CEFTRIAXONE 2000 MG: 2 INJECTION, POWDER, FOR SOLUTION INTRAMUSCULAR; INTRAVENOUS at 03:15

## 2022-06-22 RX ADMIN — ALBUMIN (HUMAN) 25 G: 0.25 INJECTION, SOLUTION INTRAVENOUS at 04:43

## 2022-06-22 RX ADMIN — FUROSEMIDE 60 MG: 10 INJECTION, SOLUTION INTRAMUSCULAR; INTRAVENOUS at 08:50

## 2022-06-22 NOTE — ASSESSMENT & PLAN NOTE
· Baseline last documented at 1 1 on renal function from 2020-21  · Cr 1 56 on admission- suspect hypertensive CKD vs  VASILE from CHF/cardiorenal syndrome  · Trend renal function and UOP  · Trial diuretics for goal negative 500cc-1L

## 2022-06-22 NOTE — PLAN OF CARE
Problem: MOBILITY - ADULT  Goal: Maintain or return to baseline ADL function  Description: INTERVENTIONS:  -  Assess patient's ability to carry out ADLs; assess patient's baseline for ADL function and identify physical deficits which impact ability to perform ADLs (bathing, care of mouth/teeth, toileting, grooming, dressing, etc )  - Assess/evaluate cause of self-care deficits   - Assess range of motion  - Assess patient's mobility; develop plan if impaired  - Assess patient's need for assistive devices and provide as appropriate  - Encourage maximum independence but intervene and supervise when necessary  - Involve family in performance of ADLs  - Assess for home care needs following discharge   - Consider OT consult to assist with ADL evaluation and planning for discharge  - Provide patient education as appropriate  Outcome: Progressing  Goal: Maintains/Returns to pre admission functional level  Description: INTERVENTIONS:  - Perform BMAT or MOVE assessment daily    - Set and communicate daily mobility goal to care team and patient/family/caregiver     - Collaborate with rehabilitation services on mobility goals if consulted  - Out of bed to chair 2 times a day   - Out of bed for meals 2 times a day  - Out of bed for toileting  - Record patient progress and toleration of activity level   Outcome: Progressing     Problem: Potential for Falls  Goal: Patient will remain free of falls  Description: INTERVENTIONS:  - Educate patient/family on patient safety including physical limitations  - Instruct patient to call for assistance with activity   - Consult OT/PT to assist with strengthening/mobility   - Keep Call bell within reach  - Keep bed low and locked with side rails adjusted as appropriate  - Keep care items and personal belongings within reach  - Initiate and maintain comfort rounds  - Apply yellow socks and bracelet for high fall risk patients  - Consider moving patient to room near nurses station  Outcome: Progressing     Problem: Prexisting or High Potential for Compromised Skin Integrity  Goal: Skin integrity is maintained or improved  Description: INTERVENTIONS:  - Identify patients at risk for skin breakdown  - Assess and monitor skin integrity  - Assess and monitor nutrition and hydration status  - Monitor labs   - Assess for incontinence   - Turn and reposition patient  - Assist with mobility/ambulation  - Relieve pressure over bony prominences  - Avoid friction and shearing  - Provide appropriate hygiene as needed including keeping skin clean and dry  - Evaluate need for skin moisturizer/barrier cream  - Collaborate with interdisciplinary team   - Patient/family teaching  - Consider wound care consult   Outcome: Progressing     Problem: PAIN - ADULT  Goal: Verbalizes/displays adequate comfort level or baseline comfort level  Description: Interventions:  - Encourage patient to monitor pain and request assistance  - Assess pain using appropriate pain scale  - Administer analgesics based on type and severity of pain and evaluate response  - Implement non-pharmacological measures as appropriate and evaluate response  - Consider cultural and social influences on pain and pain management  - Notify physician/advanced practitioner if interventions unsuccessful or patient reports new pain  Outcome: Progressing     Problem: INFECTION - ADULT  Goal: Absence or prevention of progression during hospitalization  Description: INTERVENTIONS:  - Assess and monitor for signs and symptoms of infection  - Monitor lab/diagnostic results  - Monitor all insertion sites, i e  indwelling lines, tubes, and drains  - Monitor endotracheal if appropriate and nasal secretions for changes in amount and color  - [place] appropriate cooling/warming therapies per order  - Administer medications as ordered  - Instruct and encourage patient and family to use good hand hygiene technique  - Identify and instruct in appropriate isolation precautions for identified infection/condition  Outcome: Progressing  Goal: Absence of fever/infection during neutropenic period  Description: INTERVENTIONS:  - Monitor WBC    Outcome: Progressing     Problem: DISCHARGE PLANNING  Goal: Discharge to home or other facility with appropriate resources  Description: INTERVENTIONS:  - Identify barriers to discharge w/patient and caregiver  - Arrange for needed discharge resources and transportation as appropriate  - Identify discharge learning needs (meds, wound care, etc )  - Arrange for interpretive services to assist at discharge as needed  - Refer to Case Management Department for coordinating discharge planning if the patient needs post-hospital services based on physician/advanced practitioner order or complex needs related to functional status, cognitive ability, or social support system  Outcome: Progressing     Problem: Knowledge Deficit  Goal: Patient/family/caregiver demonstrates understanding of disease process, treatment plan, medications, and discharge instructions  Description: Complete learning assessment and assess knowledge base    Interventions:  - Provide teaching at level of understanding  - Provide teaching via preferred learning methods  Outcome: Progressing     Problem: CARDIOVASCULAR - ADULT  Goal: Maintains optimal cardiac output and hemodynamic stability  Description: INTERVENTIONS:  - Monitor I/O, vital signs and rhythm  - Monitor for S/S and trends of decreased cardiac output  - Administer and titrate ordered vasoactive medications to optimize hemodynamic stability  - Assess quality of pulses, skin color and temperature  - Assess for signs of decreased coronary artery perfusion  - Instruct patient to report change in severity of symptoms  Outcome: Progressing  Goal: Absence of cardiac dysrhythmias or at baseline rhythm  Description: INTERVENTIONS:  - Continuous cardiac monitoring, vital signs, obtain 12 lead EKG if ordered  - Administer antiarrhythmic and heart rate control medications as ordered  - Monitor electrolytes and administer replacement therapy as ordered  Outcome: Progressing

## 2022-06-22 NOTE — ASSESSMENT & PLAN NOTE
· CT PE demonstrated RUL infiltrate concerning for PNA, started on CAP tx w/ IV rocephin/azithromycin  · De-escalate to rocpehin alone given legionella negative  · Pro dinorah negative, consider d/c abx

## 2022-06-22 NOTE — PROGRESS NOTES
Critical Care Services- Interval Progress Note   Francisco J Grey 46 y o  male MRN: [id number]  Unit/Bed#:  Encounter: [id number]  Assessment and Plan  Interval Events:  Patient developed sudden hypotension following completion of 150 mg amiodarone bolus  Patient reporting light-headedness and increased dyspnea with intermittent abdominal pain and distention  Norepinephrine initiated, stat labs and xrays ordered       Diagnosis: Shock  o Plan: Unclear etiology, EKG notable for increase in QTc from 440 to 510 following amiodarone, continue norepinephrine for AFV>42 and systolic greater than 85, follow infectious work-up, check stat CXR and KUB     Diagnosis: Atrial fibrillation with rapid ventricular response  o Plan: Rate better controlled after 150 mg amiodarone however significant hemodynamic compromise, hold further attempts at rate/rhythm control, obtain ECHO, continue heparin infusion for now     Diagnosis: Abdominal pain  o Plan: Follow imaging and lab work-up, once hemodynamics improve would pursue CT A/P      Upon my evaluation, this patient had a high probability of imminent or life-threatening deterioration due to worsening shock, which required my direct attention, intervention, and personal management  I have personally provided 90 minutes (0430 to 0600) on 06/22/2022 of critical care time, exclusive of procedures, teaching, family meetings, and any prior time recorded by providers other than myself  Time includes review of laboratory data, review of imaging/radiology results, monitoring for potential decompensation    Interventions were performed as documented above    -------------------------------------------------------------------------------------------------------------------------------------  Hemodynamic Monitoring:  Vital Signs:   HR: 116  BP: 86/56 on 8 of norepinephrine  MAP: 65  RR: 30  SpO2: 100% on 4 L NC oxygen  Cardiac Monitoring:   Telemetry rhythm: Atrial fibrillation    Laboratory   Results from last 7 days   Lab Units 06/22/22  0540 06/21/22  2101   WBC Thousand/uL 10 70* 10 24*   HEMOGLOBIN g/dL 13 0 14 0   HEMATOCRIT % 40 6 42 2   PLATELETS Thousands/uL 229 236   NEUTROS PCT %  --  61   MONOS PCT %  --  10     Results from last 7 days   Lab Units 06/21/22  2101   SODIUM mmol/L 142   POTASSIUM mmol/L 3 9   CHLORIDE mmol/L 106   CO2 mmol/L 24   ANION GAP mmol/L 12   BUN mg/dL 17   CREATININE mg/dL 1 56*   CALCIUM mg/dL 9 2   GLUCOSE RANDOM mg/dL 97   ALT U/L 64   AST U/L 20   ALK PHOS U/L 75   ALBUMIN g/dL 3 5   TOTAL BILIRUBIN mg/dL 0 59     Results from last 7 days   Lab Units 06/21/22  2101   MAGNESIUM mg/dL 2 4      Results from last 7 days   Lab Units 06/22/22  0138 06/21/22  2101   INR   --  1 07   PTT seconds 30  --           Results from last 7 days   Lab Units 06/21/22  2101   LACTIC ACID mmol/L 1 8     ABG:    VBG:  Results from last 7 days   Lab Units 06/21/22  2325   PH DANYEL  7 371   PCO2 DANYEL mm Hg 39 9*   PO2 DANYEL mm Hg 31 3*   HCO3 DANYEL mmol/L 22 6*   BASE EXC DANYEL mmol/L -2 4     Results from last 7 days   Lab Units 06/22/22  0221   PROCALCITONIN ng/ml 0 07       Micro        Diagnostic Imaging / Data: I have personally reviewed pertinent reports     and I have personally reviewed pertinent films in PACS    Medications:  Current Facility-Administered Medications   Medication Dose Route Frequency    atorvastatin (LIPITOR) tablet 10 mg  10 mg Oral Daily With Dinner    [START ON 6/23/2022] azithromycin (ZITHROMAX) tablet 250 mg  250 mg Oral Q24H    cefTRIAXone (ROCEPHIN) 2,000 mg in dextrose 5 % 50 mL IVPB  2,000 mg Intravenous Q24H    heparin (porcine) 25,000 units in D5W 250 mL infusion (premix)  3-20 Units/kg/hr (Order-Specific) Intravenous Titrated    heparin (porcine) injection 2,000 Units  2,000 Units Intravenous Q1H PRN    heparin (porcine) injection 4,000 Units  4,000 Units Intravenous Q1H PRN    lidocaine (PF) (XYLOCAINE-MPF) 1 % injection 5 mL  5 mL Infiltration Once    magnesium sulfate 2 g/50 mL IVPB (premix) 2 g  2 g Intravenous Once    metoprolol (LOPRESSOR) injection 5 mg  5 mg Intravenous Q6H PRN    NOREPINEPHRINE 4 MG  ML NSS (CMPD ORDER) infusion  1-30 mcg/min Intravenous Titrated       SIGNATURE: JEROD Rain    Portions of the record may have been created with voice recognition software  Occasional wrong word or "sound a like" substitutions may have occurred due to the inherent limitations of voice recognition software    Read the chart carefully and recognize, using context, where substitutions have occurred

## 2022-06-22 NOTE — ASSESSMENT & PLAN NOTE
· No medications at home however patient states in the past he was told he had high BP and did not want to start BP medications at this time  · Brief severe hypotension requiring levophed, now off, holding anti-HTNs in setting of recent hypotension

## 2022-06-22 NOTE — UTILIZATION REVIEW
Initial Clinical Review    Admission: Date/Time/Statement:   Admission Orders (From admission, onward)     Ordered        06/21/22 2348  Inpatient Admission  Once                      Orders Placed This Encounter   Procedures    Inpatient Admission     Standing Status:   Standing     Number of Occurrences:   1     Order Specific Question:   Level of Care     Answer:   Level 2 Stepdown / HOT [14]     Order Specific Question:   Estimated length of stay     Answer:   More than 2 Midnights     Order Specific Question:   Certification     Answer:   I certify that inpatient services are medically necessary for this patient for a duration of greater than two midnights  See H&P and MD Progress Notes for additional information about the patient's course of treatment  ED Arrival Information     Expected   -    Arrival   6/21/2022 20:34    Acuity   Emergent            Means of arrival   Wheelchair    Escorted by   Self    Service   Critical Care/ICU    Admission type   Emergency            Arrival complaint   Increased Heart Rate and SOB           Chief Complaint   Patient presents with    Shortness of Breath     Increased shortness of breath starting the last several days  Patient reports pain with deep breathing  Initial Presentation: 46 y o  male to the ED from home with complaints of shortness of breath for 4 days  Admitted to CRITICAL CARE step down for CHF, afib with RVR, htn  Arrives with heart rate in 170s, tachypneic, diaphoretic, +2 pitting edema  Has chronic AL, orthopnea and intermittent lower extremity swelling  H/O HTN, 20 PPD smoking, HLD, and hidradenitis suppurativa  In the ED, given 1 LIter NSS, cardizem bolus, started on cardizem drip, given IV lopressor and IV lasix  D-dimer elevated  Heparin drip started  NO PE on CT chest      Date: 6/22   Day 2:   Developed hypotension after amiodarone bolus, lightheaded, increased dyspnea  NOrepinephrine started  Now with shock of unclear etiology  EKG shows increase in QTC from 440 to 510 after amio  Tachycardic currently  Cardiology consult:  New onset afib with RVR  Start on amiodarone drip  COntinue with heparin drip  ECHO shows 35%  REquiring norepi now  Irregularly irregular tachycardic  Also tachypneic  Decreased breath sounds with bibasilar crackles     PROCEDURE NOTE:  Date/Time: 6/22/2022 6:29 AM  Right radial arterial line placement    ED Triage Vitals   Temperature Pulse Respirations Blood Pressure SpO2   06/21/22 2114 06/21/22 2038 06/21/22 2038 06/21/22 2038 06/21/22 2038   99 5 °F (37 5 °C) (!) 176 (!) 24 120/81 97 %      Temp Source Heart Rate Source Patient Position - Orthostatic VS BP Location FiO2 (%)   06/21/22 2114 06/21/22 2038 06/21/22 2115 06/21/22 2115 --   Tympanic Monitor Lying Left arm       Pain Score       06/21/22 2300       No Pain          Wt Readings from Last 1 Encounters:   06/22/22 127 kg (279 lb)     Additional Vital Signs:   Date/Time Temp Pulse Resp BP MAP (mmHg) Arterial Line BP MAP SpO2 Calculated FIO2 (%) - Nasal Cannula Nasal Cannula O2 Flow Rate (L/min) O2 Device Patient Position - Orthostatic VS   06/22/22 1000 -- -- -- 117/82 -- -- -- -- -- -- -- --   06/22/22 0800 -- 126 Abnormal  -- 105/83 -- -- -- -- -- -- -- --   06/22/22 0700 -- 142 Abnormal  35 Abnormal  104/69 82 118/67 89 mmHg 96 % -- -- -- --   06/22/22 0645 -- 119 Abnormal  34 Abnormal  107/64 81 114/79 93 mmHg 93 % -- -- -- --   06/22/22 0630 -- 122 Abnormal  38 Abnormal  114/65 82 93/64 74 mmHg 94 % -- -- -- --   06/22/22 0600 -- 120 Abnormal  42 Abnormal  86/68 Abnormal  73 81/59 68 mmHg 96 % -- -- -- --   06/22/22 0530 -- 122 Abnormal  37 Abnormal  78/53 Abnormal  61 Abnormal  -- -- 95 % -- -- -- --   06/22/22 0500 -- 122 Abnormal  39 Abnormal  75/60 Abnormal  65 -- -- 97 % -- -- -- --   06/22/22 0025 -- 127 Abnormal  31 Abnormal  90/64 73 -- -- 95 % 36 4 L/min Nasal cannula --   06/22/22 0015 -- 121 Abnormal  34 Abnormal  80/64 Abnormal  69 -- -- 95 % -- -- Nasal cannula Lying   06/22/22 0008 -- 124 Abnormal  29 Abnormal  89/54 Abnormal  63 Abnormal  -- -- 94 % 36 4 L/min Nasal cannula --   06/22/22 0000 -- 113 Abnormal  27 Abnormal  76/43 Abnormal  48 Abnormal  -- -- 93 % 36 4 L/min Nasal cannula --   06/21/22 2330 -- 154 Abnormal  31 Abnormal  144/67 94 -- -- 89 % Abnormal  36 4 L/min Nasal cannula --   06/21/22 2300 -- 152 Abnormal  36 Abnormal  115/94 99 -- -- 92 % 28 2 L/min Nasal cannula --   06/21/22 2245 -- 154 Abnormal  26 Abnormal  115/92 100 -- -- 91 % -- -- None (Room air) --   06/21/22 2200 -- 149 Abnormal  24 Abnormal  109/81 89 -- -- 93 % -- -- None (Room air) --   06/21/22 2145 -- 151 Abnormal  24 Abnormal  107/75 82 -- -- 94 % -- -- None (Room air) Lying   06/21/22 2133 -- -- -- -- -- -- -- -- -- -- None (Room air) --   06/21/22 2115 -- 150 Abnormal  29 Abnormal  119/85 93 -- -- 95 % -- -- -- Lying   06/21/22 2114 99 5 °F (37 5 °C) 151 Abnormal  26 Abnormal  109/73 -- -- -- 95 % -- -- -- --   06/21/22 2038 -- 176 Abnormal  24 Abnormal  120/81 -- -- -- 97 % -- -- None (Room air) --     Pertinent Labs/Diagnostic Test Results:   6/22 EKG: Atrial fibrillation with rapid ventricular response  Rightward axis  Low voltage QRS  ST & T wave abnormality, consider anterolateral ischemia  Abnormal ECG  6/22 ECHO: Left Ventricle: Left ventricular cavity size is dilated  Wall thickness is normal  Systolic function is moderately reduced (35%)  There is moderate global hypokinesis with regional variation (hypokinesis of anterior wall)  Diastolic function is normal     Right Ventricle: Right ventricular cavity size is upper normal  Systolic function is normal     Left Atrium: The atrium is mildly dilated    Right Atrium: The atrium is mildly dilated    Aortic Valve: There is trace regurgitation    Mitral Valve: There is moderate to severe regurgitation    Tricuspid Valve: There is mild to moderate regurgitation   The right ventricular systolic pressure is mildly elevated (53 mm Hg)    Pulmonic Valve: There is trace regurgitation    IVC/SVC: The inferior vena cava is dilated  Respirophasic changes were blunted (less than 50% variation)  CT abdomen pelvis wo contrast   Final Result by Shawnee Owusu MD (06/22 1836)      Stable findings of the lung bases including partially visualized mild mediastinal adenopathy  Noncontrast chest CT follow-up in 3 months is advised  No definitive evidence of acute abdominopelvic process allowing for mild motion artifact  Gallbladder wall thickening potentially due to CHF  No definitive calcified gallstones or pericholecystic inflammatory changes  Sigmoid diverticulosis  Workstation performed: [id number]         XR abdomen 1 view kub   Final Result by Dona Chi MD (06/22 9445)      Unremarkable abdomen  Workstation performed: [id number]         XR chest portable ICU   Final Result by Dona Chi MD (06/22 8403)      No acute cardiopulmonary disease  Findings are stable            Workstation performed: [id number]         CTA ED chest PE Study   Final Result by Rocio Mg MD (06/22 0143)      Patchy groundglass airspace disease throughout the right upper lobe consistent with acute infiltrate, likely infectious or inflammatory etiology      Findings consistent with CHF  Workstation performed: [id number]         XR chest 1 view portable   Final Result by Dona Chi MD (06/22 1604)      No acute cardiopulmonary disease        Findings are stable            Workstation performed: [id number]               Results from last 7 days   Lab Units 06/22/22  0540 06/21/22  2101   WBC Thousand/uL 10 70* 10 24*   HEMOGLOBIN g/dL 13 0 14 0   HEMATOCRIT % 40 6 42 2   PLATELETS Thousands/uL 229 236   NEUTROS ABS Thousands/µL  --  6 30         Results from last 7 days   Lab Units 06/22/22  0540 06/21/22  2101   SODIUM mmol/L 140 142   POTASSIUM mmol/L 4 7 3 9   CHLORIDE mmol/L 105 106   CO2 mmol/L 15* 24   ANION GAP mmol/L 20* 12   BUN mg/dL 21 17   CREATININE mg/dL 1 87* 1 56*   EGFR ml/min/1 73sq m 40 50   CALCIUM mg/dL 8 6 9 2   MAGNESIUM mg/dL 2 8* 2 4   PHOSPHORUS mg/dL 5 1*  --      Results from last 7 days   Lab Units 06/22/22  0540 06/21/22 2101   AST U/L 36 20   ALT U/L 72 64   ALK PHOS U/L 70 75   TOTAL PROTEIN g/dL 7 7 7 9   ALBUMIN g/dL 3 6 3 5   TOTAL BILIRUBIN mg/dL 0 48 0 59   BILIRUBIN DIRECT mg/dL 0 19  --      Results from last 7 days   Lab Units 06/22/22  0806 06/22/22  0022   POC GLUCOSE mg/dl 223* 150*     Results from last 7 days   Lab Units 06/22/22  0540 06/21/22 2101   GLUCOSE RANDOM mg/dL 286* 97             No results found for: BETA-HYDROXYBUTYRATE   Results from last 7 days   Lab Units 06/22/22  0602   PH ART  7 363   PCO2 ART mm Hg 22 7*   PO2 ART mm Hg 77 3   HCO3 ART mmol/L 12 6*   BASE EXC ART mmol/L -10 8   O2 CONTENT ART mL/dL 17 2   O2 HGB, ARTERIAL % 93 8*   ABG SOURCE  Line, Arterial     Results from last 7 days   Lab Units 06/21/22 2325   PH DANYEL  7 371   PCO2 DANYEL mm Hg 39 9*   PO2 DANYEL mm Hg 31 3*   HCO3 DANYEL mmol/L 22 6*   BASE EXC DANYEL mmol/L -2 4   O2 CONTENT DANYEL ml/dL 11 1   O2 HGB, VENOUS % 54 2*             Results from last 7 days   Lab Units 06/22/22  0807 06/22/22  0543 06/22/22  0138 06/21/22  2325 06/21/22 2101   HS TNI 0HR ng/L  --  23  --   --  23   HS TNI 2HR ng/L 32  --   --  27  --    HSTNI D2 ng/L 9  --   --  4  --    HS TNI 4HR ng/L  --   --  23  --   --    HSTNI D4 ng/L  --   --  0  --   --      Results from last 7 days   Lab Units 06/21/22 2101   D-DIMER QUANTITATIVE ug/ml FEU 1 56*     Results from last 7 days   Lab Units 06/22/22  0920 06/22/22  0138 06/21/22 2101   PROTIME seconds  --   --  13 5   INR   --   --  1 07   PTT seconds 25 30  --      Results from last 7 days   Lab Units 06/21/22 2101   TSH 3RD GENERATON uIU/mL 1 047     Results from last 7 days   Lab Units 06/22/22  0221   PROCALCITONIN ng/ml 0 07     Results from last 7 days   Lab Units 06/22/22  0807 06/22/22  0540 06/21/22  2101   LACTIC ACID mmol/L 3 1* 4 8* 1 8       Results from last 7 days   Lab Units 06/21/22  2101   NT-PRO BNP pg/mL 2,533*       Results from last 7 days   Lab Units 06/22/22  0540   LIPASE u/L 35*         Results from last 7 days   Lab Units 06/22/22  0200   CLARITY UA  Clear   COLOR UA  Orange   SPEC GRAV UA  >=1 030   PH UA  6 0   GLUCOSE UA mg/dl Negative   KETONES UA mg/dl Negative   BLOOD UA  Negative   PROTEIN UA mg/dl Negative   NITRITE UA  Negative   BILIRUBIN UA  Negative   UROBILINOGEN UA E U /dl 0 2   LEUKOCYTES UA  Negative     Results from last 7 days   Lab Units 06/22/22  0154   STREP PNEUMONIAE ANTIGEN, URINE  Negative   LEGIONELLA URINARY ANTIGEN  Negative         Results from last 7 days   Lab Units 06/22/22  0220   BLOOD CULTURE  Received in Microbiology Lab  Culture in Progress  Received in Microbiology Lab  Culture in Progress       ED Treatment:   Medication Administration from 06/21/2022 2034 to 06/22/2022 0128       Date/Time Order Dose Route Action Comments     06/21/2022 2055 diltiazem (CARDIZEM) injection 10 mg 10 mg Intravenous Given      06/21/2022 2059 sodium chloride 0 9 % bolus 1,000 mL 1,000 mL Intravenous New Bag      06/21/2022 2115 heparin (porcine) injection 4,000 Units 4,000 Units Intravenous Given      06/21/2022 2115 heparin (porcine) 25,000 units in D5W 250 mL infusion (premix) 11 1 Units/kg/hr Intravenous New Bag      06/21/2022 2145 diltiazem (CARDIZEM) 125 mg in sodium chloride 0 9 % 125 mL infusion 5 mg/hr Intravenous New Bag      06/21/2022 2327 metoprolol (LOPRESSOR) injection 5 mg 5 mg Intravenous Given      06/21/2022 2343 furosemide (LASIX) injection 40 mg 40 mg Intravenous Given      06/22/2022 0031 magnesium sulfate 2 g/50 mL IVPB (premix) 2 g 2 g Intravenous New Bag      06/22/2022 0114 iohexol (OMNIPAQUE) 350 MG/ML injection (MULTI-DOSE) 70 mL 70 mL Intravenous Given         Past Medical History:   Diagnosis Date    Chest pain 9/25/2014    Eczema     Hidradenitis suppurativa 2/14/2018    Rib pain 1/28/2015       Admitting Diagnosis: CHF (congestive heart failure) ([facility]) [I50 9]  AF (atrial fibrillation) ([facility]) [I48 91]  SOB (shortness of breath) [R06 02]  Age/Sex: 46 y o  male  Admission Orders:  Scheduled Medications:  atorvastatin, 10 mg, Oral, Daily With Dinner  [START ON 6/23/2022] azithromycin, 250 mg, Oral, Q24H  cefTRIAXone, 2,000 mg, Intravenous, I28J  folic acid, 1 mg, Oral, Daily  insulin lispro, 1-5 Units, Subcutaneous, TID AC  insulin lispro, 1-5 Units, Subcutaneous, HS  lidocaine (PF), 5 mL, Infiltration, Once  LORazepam, 2 mg, Intravenous, Once  multivitamin-minerals, 1 tablet, Oral, Daily  thiamine, 100 mg, Oral, Daily      Continuous IV Infusions:  amiodarone, 1 mg/min, Intravenous, Continuous  amiodarone, 0 5 mg/min, Intravenous, Continuous  heparin (porcine), 3-20 Units/kg/hr (Order-Specific), Intravenous, Titrated  norepinephrine, 1-30 mcg/min, Intravenous, Titrated      PRN Meds:  heparin (porcine), 2,000 Units, Intravenous, Q1H PRN  heparin (porcine), 4,000 Units, Intravenous, Q1H PRN        IP CONSULT TO CASE MANAGEMENT  IP CONSULT TO CARDIOLOGY    Network Utilization Review Department  ATTENTION: Please call with any questions or concerns to [phone] and carefully listen to the prompts so that you are directed to the right person  All voicemails are confidential   Susan Plascencia all requests for admission clinical reviews, approved or denied determinations and any other requests to dedicated fax number below belonging to the campus where the patient is receiving treatment   List of dedicated fax numbers for the Facilities:  [address] [place] [address] DENIALS (Administrative/Medical Necessity) [phone]   [address] (Maternity/NICU/Pediatrics) [phone]   [address] [phone] [address]  [phone]   [address] [facility] [place] [phone] [id number] [place] [address] [address] [phone]

## 2022-06-22 NOTE — ASSESSMENT & PLAN NOTE
Wt Readings from Last 3 Encounters:   11/16/21 124 kg (274 lb)   09/11/20 127 kg (279 lb)   06/30/20 127 kg (279 lb)     · No documented hx of CHF, no previous echos on file  · NT BNP 2533, no previous records on file  · Daily weights   · Clinical hx of CHF given sx of AL, orthopnea, leg swelling, worsening SOB  · 40mg IV lasix x1  · goal negative -500 cc to -1L

## 2022-06-22 NOTE — ASSESSMENT & PLAN NOTE
· Baseline last documented at 1 1 on renal function from 2020-21  · Cr 1 56 on admission, relatively stable at 1 52 on evening re-check   · Trend renal function and UOP  · Continue diuresis for goal negative 500cc-1L

## 2022-06-22 NOTE — ASSESSMENT & PLAN NOTE
· Presented in AF RVR with rates of 170, given cardizem bolus and started on gtt  · Given 5mg lopressor in ED with benefit    · Wean cardizem gtt as able, start IV lopressor 5mg q6h scheduled and consider transition to PO BB  · If unable to control rates effectively consider amio load  · TTE in AM  · Cardiology consult in AM

## 2022-06-22 NOTE — UTILIZATION REVIEW
Inpatient Admission Authorization Request   NOTIFICATION OF INPATIENT ADMISSION/INPATIENT AUTHORIZATION REQUEST   SERVICING FACILITY:   [address] [place] [address]  Tax ID: [id number]  NPI: [id number]  Place of Service: Inpatient [facility]  [address]  Place of Service Code: 24     ATTENDING PROVIDER:  Attending Name and NPI#: Suzi Venegasr, Md [[id number]]  Address: [address] [place] [address]  Phone: [phone]     UTILIZATION REVIEW CONTACT:  Kathy Bains Utilization   Network Utilization Review Department  Phone: [phone]  Fax [phone]  Email: [email or web address]     PHYSICIAN ADVISORY SERVICES:  FOR [id number] REVIEW - MEDICAL NECESSITY DENIAL  Phone: [phone]  Fax: [phone]  Email: [email or web address]     TYPE OF REQUEST:  Inpatient Status     ADMISSION INFORMATION:  ADMISSION DATE/TIME: 6/21/22 11:43 PM  PATIENT DIAGNOSIS CODE/DESCRIPTION:  CHF (congestive heart failure) ([facility]) [I50 9]  AF (atrial fibrillation) ([facility]) [I48 91]  SOB (shortness of breath) [R06 02]  DISCHARGE DATE/TIME: No discharge date for patient encounter  IMPORTANT INFORMATION:  Please contact Kathy Bains directly with any questions or concerns regarding this request  Department voicemails are confidential     Send requests for admission clinical reviews, concurrent reviews, approvals, and administrative denials due to lack of clinical to fax [phone]

## 2022-06-22 NOTE — ASSESSMENT & PLAN NOTE
· No medications at home however patient states in the past he was told he had high BP and did not want to start BP medications at this time     · Once off IV medications would benefit from PO BB

## 2022-06-22 NOTE — SEPSIS NOTE
Sepsis Note   Trudi Gallo 46 y o  male MRN: [id number]  Unit/Bed#:  Encounter: [id number]       qSOFA     Row Name 06/22/22 0133 06/22/22 0037 06/22/22 0025 06/22/22 0015 06/22/22 0008    Altered mental status GCS < 15 -- -- -- -- --    Respiratory Rate > / =22 1 1 1 1 1    Systolic BP < / =757 0 1 1 1 1    Q Sofa Score 1 2 2 2 2    Row Name 06/22/22 0000 06/21/22 2330 06/21/22 2300 06/21/22 2245 06/21/22 2200    Altered mental status GCS < 15 -- -- -- -- --    Respiratory Rate > / =22 1 1 1 1 1    Systolic BP < / =055 1 0 0 0 0    Q Sofa Score 2 1 1 1 1    Row Name 06/21/22 2145 06/21/22 2115 06/21/22 2114 06/21/22 2038       Altered mental status GCS < 15 -- -- -- --     Respiratory Rate > / =22 1 1 1 1     Systolic BP < / =018 0 0 0 0     Q Sofa Score 1 1 1 1                 Sepsis time zero triggered for low MAP, tachycardia and tachypnea  Tachycardia from AF, hypotension attributed to lopressor dose while still on cardizem gtt, resolved once Cardizem gtt discontinued  Pt started on empiric CAP tx given CT findings demonstrating possible PNA  Infectious w/u in process  Abx administered  LA previously negative and hypotension resolved with discontinuation of Cardizem gtt so no concerns for severe sepsis at this point  No indication for 30cc/kg fluid bolus         Taina Cheema PA-C

## 2022-06-22 NOTE — ASSESSMENT & PLAN NOTE
· Presented in AF RVR with rates of 170, given cardizem bolus and started on gtt in ED with minimal repsonse  · Stopped Cardizem gtt and discontinued IV lopressor  · Given amio bolus x1 with significant hypotension requiring levophed  · Started on Amio gtt in AM, tolerated well without further hemodynamic compromise  · amio gtt continued at 0 5/hr  · Rates continued to rise, planned for Dig load with 500mcg x1 and 250mcg q6 x2  · Given IV lopressor 2 5 mg and 5mg for rates >150 o/n     · Cardiology consulted, appreciate recs  · If continued in AF w/ difficulty controlling rates consider MIKO/cardioversion

## 2022-06-22 NOTE — ED PROVIDER NOTES
History  Chief Complaint   Patient presents with    Shortness of Breath     Increased shortness of breath starting the last several days  Patient reports pain with deep breathing  46year old male patient comes to the ED with cc of palpitations for 4 days  He is currently in afib with rvr at a rate of 176  Because of the duration and the stability of the patient currently (bp is stable) I am starting on IV cardizem  I am starting heparin because he has had symptoms for four days  EKG shows afib with minor ischemic changes  The patient right now only complains of fatigue without chest pains or shortness of breath  History provided by:  Patient   used: No    Rapid Heart Rate  Palpitations quality:  Irregular  Onset quality:  Gradual  Timing:  Constant  Progression:  Worsening  Chronicity:  New  Context: not anxiety, not caffeine and not dehydration    Relieved by:  Nothing  Worsened by:  Nothing  Ineffective treatments:  None tried  Associated symptoms: no dizziness, no lower extremity edema, no malaise/fatigue and no vomiting        Prior to Admission Medications   Prescriptions Last Dose Informant Patient Reported? Taking?   atorvastatin (LIPITOR) 10 mg tablet   No No   Sig: Take 1 tablet (10 mg total) by mouth daily   Patient not taking: Reported on 11/16/2021    halobetasol (ULTRAVATE) 0 05 % cream   No No   Sig: Apply topically once for 1 dose   nicotine polacrilex (COMMIT) 4 MG lozenge 6/21/2022 at Unknown time  No Yes   Sig: Apply 1 lozenge (4 mg total) to the mouth or throat as needed for smoking cessation      Facility-Administered Medications: None       Past Medical History:   Diagnosis Date    Chest pain 9/25/2014    Eczema     Hidradenitis suppurativa 2/14/2018    Rib pain 1/28/2015       History reviewed  No pertinent surgical history      Family History   Problem Relation Age of Onset    Mental illness Mother         denied    Substance Abuse Mother denied    Mental illness Father         denied    Substance Abuse Father         denied    Heart disease Father         cardiac disorder     I have reviewed and agree with the history as documented  E-Cigarette/Vaping    E-Cigarette Use Never User      E-Cigarette/Vaping Substances     Social History     Tobacco Use    Smoking status: Former Smoker     Packs/day: 1 00     Years: 20 00     Pack years: 20 00    Smokeless tobacco: Former User   Vaping Use    Vaping Use: Never used   Substance Use Topics    Alcohol use: Yes     Comment: social; moderate    Drug use: No       Review of Systems   Constitutional: Negative for malaise/fatigue  Cardiovascular: Positive for palpitations  Gastrointestinal: Negative for vomiting  Neurological: Negative for dizziness  All other systems reviewed and are negative  Physical Exam  Physical Exam  Vitals and nursing note reviewed  Constitutional:       General: He is not in acute distress  Appearance: He is well-developed  He is not diaphoretic  HENT:      Head: Normocephalic and atraumatic  Right Ear: External ear normal       Left Ear: External ear normal    Eyes:      General: No scleral icterus  Right eye: No discharge  Left eye: No discharge  Conjunctiva/sclera: Conjunctivae normal    Neck:      Thyroid: No thyromegaly  Vascular: No JVD  Trachea: No tracheal deviation  Cardiovascular:      Rate and Rhythm: Normal rate and regular rhythm  Pulmonary:      Effort: Pulmonary effort is normal  No respiratory distress  Breath sounds: Normal breath sounds  No stridor  No wheezing or rales  Abdominal:      General: Bowel sounds are normal  There is no distension  Palpations: Abdomen is soft  Tenderness: There is no abdominal tenderness  Musculoskeletal:         General: No tenderness or deformity  Normal range of motion  Cervical back: Normal range of motion and neck supple     Skin: General: Skin is warm and dry  Neurological:      Mental Status: He is alert and oriented to person, place, and time  Cranial Nerves: No cranial nerve deficit        Coordination: Coordination normal    Psychiatric:         Behavior: Behavior normal          Vital Signs  ED Triage Vitals   Temperature Pulse Respirations Blood Pressure SpO2   06/21/22 2114 06/21/22 2038 06/21/22 2038 06/21/22 2038 06/21/22 2038   99 5 °F (37 5 °C) (!) 176 (!) 24 120/81 97 %      Temp Source Heart Rate Source Patient Position - Orthostatic VS BP Location FiO2 (%)   06/21/22 2114 06/21/22 2038 06/21/22 2115 06/21/22 2115 --   Tympanic Monitor Lying Left arm       Pain Score       06/21/22 2300       No Pain           Vitals:    06/22/22 1200 06/22/22 1300 06/22/22 1400 06/22/22 1450   BP: 115/82  112/72 122/82   Pulse: (!) 131 (!) 144 (!) 140 (!) 139   Patient Position - Orthostatic VS:             Visual Acuity      ED Medications  Medications   heparin (porcine) 25,000 units in D5W 250 mL infusion (premix) (15 1 Units/kg/hr × 90 kg (Order-Specific) Intravenous Rate/Dose Change 6/22/22 1137)   heparin (porcine) injection 4,000 Units (4,000 Units Intravenous Given 6/22/22 1137)   heparin (porcine) injection 2,000 Units (has no administration in time range)   atorvastatin (LIPITOR) tablet 10 mg (has no administration in time range)   cefTRIAXone (ROCEPHIN) 2,000 mg in dextrose 5 % 50 mL IVPB (0 mg Intravenous Stopped 6/22/22 0419)   azithromycin (ZITHROMAX) tablet 250 mg (has no administration in time range)   NOREPINEPHRINE 4 MG  ML NSS (CMPD ORDER) infusion (3 mcg/min Intravenous Rate/Dose Change 6/22/22 1140)   lidocaine (PF) (XYLOCAINE-MPF) 1 % injection 5 mL (5 mL Infiltration Not Given 6/22/22 0806)   insulin lispro (HumaLOG) 100 units/mL subcutaneous injection 1-5 Units (1 Units Subcutaneous Given 6/22/22 1223)   insulin lispro (HumaLOG) 100 units/mL subcutaneous injection 1-5 Units (has no administration in time range)   thiamine tablet 100 mg (100 mg Oral Given 5/13/22 8930)   folic acid (FOLVITE) tablet 1 mg (1 mg Oral Given 6/22/22 0850)   multivitamin-minerals (CENTRUM) tablet 1 tablet (1 tablet Oral Given 6/22/22 0850)   amiodarone (CORDARONE) 900 mg in dextrose 5 % 500 mL infusion (1 mg/min Intravenous New Bag 6/22/22 1110)   amiodarone (CORDARONE) 900 mg in dextrose 5 % 500 mL infusion (has no administration in time range)   diltiazem (CARDIZEM) injection 10 mg (10 mg Intravenous Given 6/21/22 2055)   sodium chloride 0 9 % bolus 1,000 mL (0 mL Intravenous Stopped 6/22/22 0200)   heparin (porcine) injection 4,000 Units (4,000 Units Intravenous Given 6/21/22 2115)   diltiazem (CARDIZEM) 125 mg in sodium chloride 0 9 % 125 mL infusion (0 mg/hr Intravenous Stopped 6/22/22 0002)   metoprolol (LOPRESSOR) injection 5 mg (5 mg Intravenous Given 6/21/22 2327)   furosemide (LASIX) injection 40 mg (40 mg Intravenous Given 6/21/22 2343)   magnesium sulfate 2 g/50 mL IVPB (premix) 2 g (0 g Intravenous Stopped 6/22/22 0300)   iohexol (OMNIPAQUE) 350 MG/ML injection (MULTI-DOSE) 70 mL (70 mL Intravenous Given 6/22/22 0114)   azithromycin (ZITHROMAX) 500 mg in sodium chloride 0 9 % 250 mL IVPB (0 mg Intravenous Stopped 6/22/22 0443)   metoprolol (LOPRESSOR) injection 2 5 mg (2 5 mg Intravenous Given 6/22/22 0319)   metoprolol (LOPRESSOR) injection 2 5 mg (2 5 mg Intravenous Given 6/22/22 0350)   amiodarone 150 mg in dextrose 5 % 100 mL IV bolus (0 mg Intravenous Stopped 6/22/22 0443)   albumin human (FLEXBUMIN) 25 % injection 25 g (0 g Intravenous Stopped 6/22/22 0600)   magnesium sulfate 2 g/50 mL IVPB (premix) 2 g (0 g Intravenous Stopped 6/22/22 0701)   furosemide (LASIX) injection 60 mg (60 mg Intravenous Given 6/22/22 0850)   LORazepam (ATIVAN) injection 2 mg (2 mg Intravenous Given 6/22/22 1046)   LORazepam (ATIVAN) injection 2 mg (2 mg Intravenous Given 6/22/22 1455)       Diagnostic Studies  Results Reviewed     Procedure Component Value Units Date/Time    Hemoglobin A1C [[id number]]  (Abnormal) Collected: 06/22/22 0601    Lab Status: Final result Specimen: Blood from Line, Arterial Updated: 06/22/22 1116     Hemoglobin A1C 5 9 %       mg/dl     Basic metabolic panel [[id number]]  (Abnormal) Collected: 06/22/22 0540    Lab Status: Final result Specimen: Blood from Hand, Left Updated: 06/22/22 0711     Sodium 140 mmol/L      Potassium 4 7 mmol/L      Chloride 105 mmol/L      CO2 15 mmol/L      ANION GAP 20 mmol/L      BUN 21 mg/dL      Creatinine 1 87 mg/dL      Glucose 286 mg/dL      Calcium 8 6 mg/dL      eGFR 40 ml/min/1 73sq m     Narrative:      [place] guidelines for Chronic Kidney Disease (CKD):     Stage 1 with normal or high GFR (GFR > 90 mL/min/1 73 square meters)    Stage 2 Mild CKD (GFR = 60-89 mL/min/1 73 square meters)    Stage 3A Moderate CKD (GFR = 45-59 mL/min/1 73 square meters)    Stage 3B Moderate CKD (GFR = 30-44 mL/min/1 73 square meters)    Stage 4 Severe CKD (GFR = 15-29 mL/min/1 73 square meters)    Stage 5 End Stage CKD (GFR <15 mL/min/1 73 square meters)  Note: GFR calculation is accurate only with a steady state creatinine    Magnesium [[id number]]  (Abnormal) Collected: 06/22/22 0540    Lab Status: Final result Specimen: Blood from Hand, Left Updated: 06/22/22 0711     Magnesium 2 8 mg/dL     Phosphorus [[id number]]  (Abnormal) Collected: 06/22/22 0540    Lab Status: Final result Specimen: Blood from Hand, Left Updated: 06/22/22 0711     Phosphorus 5 1 mg/dL     CBC [[id number]]  (Abnormal) Collected: 06/22/22 0540    Lab Status: Final result Specimen: Blood from Hand, Left Updated: 06/22/22 0547     WBC 10 70 Thousand/uL      RBC 4 26 Million/uL      Hemoglobin 13 0 g/dL      Hematocrit 40 6 %      MCV 95 fL      MCH 30 5 pg      MCHC 32 0 g/dL      RDW 13 9 %      Platelets 868 Thousands/uL      MPV 13 2 fL     HS Troponin I 4hr [[id number]]  (Normal) Collected: 06/22/22 9010    Lab Status: Final result Specimen: Blood from Arm, Right Updated: 06/22/22 0208     hs TnI 4hr 23 ng/L      Delta 4hr hsTnI 0 ng/L     APTT six (6) hours after Heparin bolus/drip initiation or dosing change [[id number]]  (Normal) Collected: 06/22/22 0138    Lab Status: Final result Specimen: Blood from Arm, Right Updated: 06/22/22 0157     PTT 30 seconds     Magnesium [[id number]]  (Normal) Collected: 06/21/22 2101    Lab Status: Final result Specimen: Blood from Arm, Left Updated: 06/22/22 0046     Magnesium 2 4 mg/dL     NT-BNP PRO [[id number]]  (Abnormal) Collected: 06/21/22 2101    Lab Status: Final result Specimen: Blood from Arm, Left Updated: 06/22/22 0046     NT-proBNP 2,533 pg/mL     Fingerstick Glucose (POCT) [[id number]]  (Abnormal) Collected: 06/22/22 0022    Lab Status: Final result Updated: 06/22/22 0023     POC Glucose 150 mg/dl     HS Troponin I 2hr [[id number]]  (Normal) Collected: 06/21/22 2325    Lab Status: Final result Specimen: Blood from Arm, Right Updated: 06/21/22 2357     hs TnI 2hr 27 ng/L      Delta 2hr hsTnI 4 ng/L     Blood gas, venous [[id number]]  (Abnormal) Collected: 06/21/22 2325    Lab Status: Final result Specimen: Blood from Arm, Right Updated: 06/21/22 2333     pH, Devan 7 371     pCO2, Devan 39 9 mm Hg      pO2, Devan 31 3 mm Hg      HCO3, Devan 22 6 mmol/L      Base Excess, Devan -2 4 mmol/L      O2 Content, Devan 11 1 ml/dL      O2 HGB, VENOUS 54 2 %     HS Troponin 0hr (reflex protocol) [[id number]]  (Normal) Collected: 06/21/22 2101    Lab Status: Final result Specimen: Blood from Arm, Left Updated: 06/21/22 2135     hs TnI 0hr 23 ng/L     TSH, 3rd generation with Free T4 reflex [[id number]]  (Normal) Collected: 06/21/22 2101    Lab Status: Final result Specimen: Blood from Arm, Left Updated: 06/21/22 2135     [address] 1 047 uIU/mL     Narrative:      Patients undergoing fluorescein dye angiography may retain small amounts of fluorescein in the body for 48-72 hours post procedure  Samples containing fluorescein can produce falsely depressed TSH values  If the patient had this procedure,a specimen should be resubmitted post fluorescein clearance  Lactic acid, plasma [[id number]]  (Normal) Collected: 06/21/22 2101    Lab Status: Final result Specimen: Blood from Arm, Left Updated: 06/21/22 2129     LACTIC ACID 1 8 mmol/L     Narrative:      Result may be elevated if tourniquet was used during collection  D-dimer, quantitative [[id number]]  (Abnormal) Collected: 06/21/22 2101    Lab Status: Final result Specimen: Blood from Arm, Left Updated: 06/21/22 2125     D-Dimer, Quant 1 56 ug/ml FEU     Narrative: In the evaluation for possible pulmonary embolism, in the appropriate (Well's Score of 4 or less) patient, the age adjusted d-dimer cutoff for this patient can be calculated as:    Age x 0 01 (in ug/mL) for Age-adjusted D-dimer exclusion threshold for a patient over 50 years      Comprehensive metabolic panel [[id number]]  (Abnormal) Collected: 06/21/22 2101    Lab Status: Final result Specimen: Blood from Arm, Left Updated: 06/21/22 2124     Sodium 142 mmol/L      Potassium 3 9 mmol/L      Chloride 106 mmol/L      CO2 24 mmol/L      ANION GAP 12 mmol/L      BUN 17 mg/dL      Creatinine 1 56 mg/dL      Glucose 97 mg/dL      Calcium 9 2 mg/dL      AST 20 U/L      ALT 64 U/L      Alkaline Phosphatase 75 U/L      Total Protein 7 9 g/dL      Albumin 3 5 g/dL      Total Bilirubin 0 59 mg/dL      eGFR 50 ml/min/1 73sq m     Narrative:      [place] guidelines for Chronic Kidney Disease (CKD):     Stage 1 with normal or high GFR (GFR > 90 mL/min/1 73 square meters)    Stage 2 Mild CKD (GFR = 60-89 mL/min/1 73 square meters)    Stage 3A Moderate CKD (GFR = 45-59 mL/min/1 73 square meters)    Stage 3B Moderate CKD (GFR = 30-44 mL/min/1 73 square meters)    Stage 4 Severe CKD (GFR = 15-29 mL/min/1 73 square meters)    Stage 5 End Stage CKD (GFR <15 mL/min/1 73 square meters)  Note: GFR calculation is accurate only with a steady state creatinine    Protime-INR [[id number]]  (Normal) Collected: 06/21/22 2101    Lab Status: Final result Specimen: Blood from Arm, Left Updated: 06/21/22 2122     Protime 13 5 seconds      INR 1 07    CBC and differential [[id number]]  (Abnormal) Collected: 06/21/22 2101    Lab Status: Final result Specimen: Blood from Arm, Left Updated: 06/21/22 2116     WBC 10 24 Thousand/uL      RBC 4 54 Million/uL      Hemoglobin 14 0 g/dL      Hematocrit 42 2 %      MCV 93 fL      MCH 30 8 pg      MCHC 33 2 g/dL      RDW 13 5 %      MPV 12 8 fL      Platelets 232 Thousands/uL      nRBC 0 /100 WBCs      Neutrophils Relative 61 %      Immat GRANS % 1 %      Lymphocytes Relative 26 %      Monocytes Relative 10 %      Eosinophils Relative 1 %      Basophils Relative 1 %      Neutrophils Absolute 6 30 Thousands/µL      Immature Grans Absolute 0 06 Thousand/uL      Lymphocytes Absolute 2 69 Thousands/µL      Monocytes Absolute 1 00 Thousand/µL      Eosinophils Absolute 0 09 Thousand/µL      Basophils Absolute 0 10 Thousands/µL                  CT abdomen pelvis wo contrast   Final Result by David Lainez MD (06/22 3548)      Stable findings of the lung bases including partially visualized mild mediastinal adenopathy  Noncontrast chest CT follow-up in 3 months is advised  No definitive evidence of acute abdominopelvic process allowing for mild motion artifact  Gallbladder wall thickening potentially due to CHF  No definitive calcified gallstones or pericholecystic inflammatory changes  Sigmoid diverticulosis  Workstation performed: [id number]         XR abdomen 1 view kub   Final Result by Russell Vicente MD (06/22 9943)      Unremarkable abdomen                  Workstation performed: [id number]         XR chest portable ICU   Final Result by Russell Vicente MD (06/22 3833)      No acute cardiopulmonary disease  Findings are stable            Workstation performed: [id number]         CTA ED chest PE Study   Final Result by Hermann Jones MD (06/22 3507)      Patchy groundglass airspace disease throughout the right upper lobe consistent with acute infiltrate, likely infectious or inflammatory etiology      Findings consistent with CHF  Workstation performed: [id number]         XR chest 1 view portable   Final Result by Joaquin Guillen MD (06/22 1326)      No acute cardiopulmonary disease  Findings are stable            Workstation performed: [id number]                    Procedures  CriticalCare Time  Performed by: Jo Ann King DO  Authorized by: Jo Ann King DO     Critical care provider statement:     Critical care time (minutes):  40    Critical care time was exclusive of:  Separately billable procedures and treating other patients    Critical care was necessary to treat or prevent imminent or life-threatening deterioration of the following conditions:  Cardiac failure    Critical care was time spent personally by me on the following activities:  Interpretation of cardiac output measurements, ordering and performing treatments and interventions, ordering and review of laboratory studies, ordering and review of radiographic studies, re-evaluation of patient's condition, review of old charts, examination of patient and evaluation of patient's response to treatment    I assumed direction of critical care for this patient from another provider in my specialty: no               ED Course                               SBIRT 22yo+    Flowsheet Row Most Recent Value   SBIRT (25 yo +)    In order to provide better care to our patients, we are screening all of our patients for alcohol and drug use  Would it be okay to ask you these screening questions? Yes Filed at: 06/21/2022 0758   Initial Alcohol Screen: US AUDIT-C     1  How often do you have a drink containing alcohol?  2 Filed at: 06/21/2022 2134   2  How many drinks containing alcohol do you have on a typical day you are drinking? 0 Filed at: 06/21/2022 2134   3a  Male UNDER 65: How often do you have five or more drinks on one occasion? 2 Filed at: 06/21/2022 2134   Audit-C Score 4 Filed at: 06/21/2022 2134   BRYAN: How many times in the past year have you    Used an illegal drug or used a prescription medication for non-medical reasons? Never Filed at: 06/21/2022 2134   DAST-10: In the past 12 months       1  Have you used drugs other than those required for medical reasons? 0 Filed at: 06/21/2022 2134   2  Do you use more than one drug at a time? 0 Filed at: 06/21/2022 2134   3  Have you had medical problems as a result of your drug use (e g , memory loss, hepatitis, convulsions, bleeding, etc )? 0 Filed at: 06/21/2022 2134   4  Have you had "blackouts" or "flashbacks" as a result of drug use? YesNo 0 Filed at: 06/21/2022 2134   5  Do you ever feel bad or guilty about your drug use? 0 Filed at: 06/21/2022 2134   6  Does your spouse (or parent) ever complain about your involvement with drugs? 0 Filed at: 06/21/2022 2134   7  Have you neglected your family because of your use of drugs? 0 Filed at: 06/21/2022 2134   8  Have you engaged in illegal activities in order to obtain drugs? 0 Filed at: 06/21/2022 2134   9  Have you ever experienced withdrawal symptoms (felt sick) when you stopped taking drugs? 0 Filed at: 06/21/2022 2134   10   Are you always able to stop using drugs when you want to? 0 Filed at: 06/21/2022 2134   DAST-10 Score 0 Filed at: 06/21/2022 2134                    MDM  Number of Diagnoses or Management Options  AF (atrial fibrillation) [facility]): new and requires workup  CHF (congestive heart failure) ([facility] ): new and requires workup     Amount and/or Complexity of Data Reviewed  Clinical lab tests: ordered and reviewed  Tests in the radiology section of CPT®: ordered and reviewed    Patient Progress  Patient progress: stable      Disposition  Final diagnoses:   AF (atrial fibrillation) ([facility])   CHF (congestive heart failure) ([facility] )     Time reflects when diagnosis was documented in both MDM as applicable and the Disposition within this note     Time User Action Codes Description Comment    6/21/2022 11:48 PM Rosy Binning [I48 91] AF (atrial fibrillation) ([facility] )     6/21/2022 11:48 PM Keli Krkonrad Add [I50 9] CHF (congestive heart failure) [facility])       ED Disposition     ED Disposition   Admit    Condition   Stable    Date/Time   Tue Jun 21, 2022 11:42 PM    Comment   Case was discussed with ICU and the patient's admission status was agreed to be Admission Status: inpatient status to the service of Dr Jim Mendez   Follow-up Information    None         Current Discharge Medication List      CONTINUE these medications which have NOT CHANGED    Details   nicotine polacrilex (COMMIT) 4 MG lozenge Apply 1 lozenge (4 mg total) to the mouth or throat as needed for smoking cessation  Qty: 100 each, Refills: 1    Associated Diagnoses: Smoker      atorvastatin (LIPITOR) 10 mg tablet Take 1 tablet (10 mg total) by mouth daily  Qty: 30 tablet, Refills: 2    Associated Diagnoses: Mixed hyperlipidemia      halobetasol (ULTRAVATE) 0 05 % cream Apply topically once for 1 dose  Qty: 200 g, Refills: 5    Comments: Please cancel Triamcinolone script  Associated Diagnoses: Rash             No discharge procedures on file      PDMP Review     None          ED Provider  Electronically Signed by           Artemio Alcaraz DO  06/22/22 6874

## 2022-06-22 NOTE — CONSULTS
Consultation - Cardiology   Shannon Thomason 46 y o  male MRN: [id number]  Unit/Bed#:  Encounter: [id number]  06/22/22  11:04 AM    Assessment/ Plan:  1  New onset of atrial fibrillation with RVR, heart rate initially 170s now in the 120s  Will start him on amiodarone drip  Continue heparin drip  Echocardiogram was done EF 35%, moderate global hypokinesis with regional variation, hypokinesis of anterior wall, mild biatrial dilation, trace AR, moderate to severe MR, mild-to-moderate TR, moderate pulmonary hypertension with PA pressure at 53, trace NE vena cava is dilated  TSH 1 04  Will need to discuss oral anticoagulation closer to discharge, [id number] = 1  Monitor tele  2  Acute systolic heart failure, EF 35%  Discussed with critical care would use diuretics p r n     Daily weights  Salt restriction  Strict I&Os  Net -1 3 L  Blood pressure precludes standing dose at this time  3  Cardiomyopathy, unclear if ischemic vs nonischemic vs tachycardia mediated, EF 35%  Patient will need ischemic evaluation prior to discharge  Blood pressure precludes goal-directed medical therapy at this time  Will slowly try to introduce as blood pressure improves  4  Pneumonia, on antibiotics, management per critical care  5  Hypotension currently requiring pressor support on norepinephrine  Last blood pressure 118/85  Continue to monitor closely, management per critical care  6  Acute kidney injury, creatinine on admission 1 5 now 1 8  Continue to monitor closely  Will need renal optimization prior to ischemic workup  7  Hyperlipidemia, lipid profile done in December shows , patient was taking Lipitor prior to hospitalization  Continue Lipitor 10 mg          History of Present Illness   Physician Requesting Consult: Merribeth Leventhal, MD    Reason for Consult / Principal Problem: afib    HPI: Shannon Thomason is a 46y o  year old male who presents with palpitations and shortness of breath for about 4 days   Patient states he was becoming more and more short of breath over the last couple of days  Patient states he works as a  and was having trouble performing his job  He also noted trouble lying flat was having to add more pillows each night  He also noted some lower extremity edema  Upon evaluation emergency room patient's heart rate was in the 170s  He was given 1 dose of Cardizem bolus as well as started on a Cardizem drip in the emergency room with some mild improvement in his heart rate  Patient became suddenly hypotensive after 1 amiodarone bolus  Patient noted lightheadedness and increased dyspnea as well as intermittent abdominal pain and distension  Patient was started on norepinephrine  Patient currently lying in the bed, tachypneic  Denies any chest pain, chest pressure, chest heaviness  Currently denies any palpitations  Patient denies any past medical history  Former smoker quit 5 weeks ago  Hx of alcohol abuse    Inpatient consult to Cardiology  Consult performed by: Denise Wright PA-C  Consult ordered by: JEROD Duran          EKG:  RVR, right word axis, low-voltage QRS, ST T wave abnormalities consider anterolateral ischemia      Review of Systems   Constitutional: Negative  Respiratory: Positive for chest tightness and shortness of breath  +orthopnea   Cardiovascular: Positive for palpitations and leg swelling  Neurological: Negative  Hematological: Negative  Psychiatric/Behavioral: Negative  All other systems reviewed and are negative  Historical Information   Past Medical History:   Diagnosis Date    Chest pain 9/25/2014    Eczema     Hidradenitis suppurativa 2/14/2018    Rib pain 1/28/2015     History reviewed  No pertinent surgical history    Social History     Substance and Sexual Activity   Alcohol Use Yes    Comment: social; moderate     Social History     Substance and Sexual Activity   Drug Use No     Social History Tobacco Use   Smoking Status Former Smoker    Packs/day: 1 00    Years: 20 00    Pack years: 20 00   Smokeless Tobacco Former User       Family History:   Family History   Problem Relation Age of Onset    Mental illness Mother         denied    Substance Abuse Mother         denied    Mental illness Father         denied    Substance Abuse Father         denied    Heart disease Father         cardiac disorder       Meds/Allergies   all current active meds have been reviewed and current meds:   Current Facility-Administered Medications   Medication Dose Route Frequency    amiodarone (CORDARONE) 900 mg in dextrose 5 % 500 mL infusion  1 mg/min Intravenous Continuous    amiodarone (CORDARONE) 900 mg in dextrose 5 % 500 mL infusion  0 5 mg/min Intravenous Continuous    atorvastatin (LIPITOR) tablet 10 mg  10 mg Oral Daily With Dinner    [START ON 6/23/2022] azithromycin (ZITHROMAX) tablet 250 mg  250 mg Oral Q24H    cefTRIAXone (ROCEPHIN) 2,000 mg in dextrose 5 % 50 mL IVPB  2,000 mg Intravenous H73S    folic acid (FOLVITE) tablet 1 mg  1 mg Oral Daily    heparin (porcine) 25,000 units in D5W 250 mL infusion (premix)  3-20 Units/kg/hr (Order-Specific) Intravenous Titrated    heparin (porcine) injection 2,000 Units  2,000 Units Intravenous Q1H PRN    heparin (porcine) injection 4,000 Units  4,000 Units Intravenous Q1H PRN    insulin lispro (HumaLOG) 100 units/mL subcutaneous injection 1-5 Units  1-5 Units Subcutaneous TID AC    insulin lispro (HumaLOG) 100 units/mL subcutaneous injection 1-5 Units  1-5 Units Subcutaneous HS    lidocaine (PF) (XYLOCAINE-MPF) 1 % injection 5 mL  5 mL Infiltration Once    multivitamin-minerals (CENTRUM) tablet 1 tablet  1 tablet Oral Daily    NOREPINEPHRINE 4 MG  ML NSS (CMPD ORDER) infusion  1-30 mcg/min Intravenous Titrated    thiamine tablet 100 mg  100 mg Oral Daily     No Known Allergies    Objective   Vitals: Blood pressure 117/82, pulse (!) 126, temperature 98 7 °F (37 1 °C), temperature source Oral, resp  rate (!) 35, height 6' 2" (1 88 m), weight 127 kg (279 lb), SpO2 96 %  , Body mass index is 35 82 kg/m² ,   Orthostatic Blood Pressures    Flowsheet Row Most Recent Value   Blood Pressure 117/82 filed at 06/22/2022 1000   Patient Position - Orthostatic VS Lying filed at 06/22/2022 9065          Systolic (01SWF), AQZ:522 , Min:75 , DXV:496     Diastolic (81UJZ), DZB:62, Min:43, Max:94        Intake/Output Summary (Last 24 hours) at 6/22/2022 1104  Last data filed at 6/22/2022 1045  Gross per 24 hour   Intake 945 5 ml   Output 1925 ml   Net -979 5 ml       Invasive Devices  Report    Peripheral Intravenous Line  Duration           Peripheral IV 06/21/22 Left Hand <1 day    Peripheral IV 06/21/22 Right Antecubital <1 day          Arterial Line  Duration           Arterial Line 06/22/22 Radial <1 day                    Physical Exam:  GEN: Alert and oriented x 3, in mild distress  Ill appearing and well nourished  HEENT: Sclera anicteric, conjunctivae pink, mucous membranes moist  Oropharynx clear  NECK: Supple, no carotid bruits, no significant JVD  Trachea midline, no thyromegaly  HEART: Irregularly irregular tachycardic,  normal S1 and S2, no murmurs, clicks, gallops or rubs  PMI nondisplaced, no thrills  LUNGS: +tachypneic, decreased breath sounds with bibasilar crackles noted  ABDOMEN: Soft, nontender, nondistended, normoactive bowel sounds  EXTREMITIES: Skin warm and well perfused, no clubbing, cyanosis, or edema  NEURO: No focal findings  Normal speech  Mood and affect normal    SKIN: Normal without suspicious lesions on exposed skin        Lab Results:     Troponins:       CBC with diff:   Results from last 7 days   Lab Units 06/22/22  0540 06/21/22  2101   WBC Thousand/uL 10 70* 10 24*   HEMOGLOBIN g/dL 13 0 14 0   HEMATOCRIT % 40 6 42 2   MCV fL 95 93   PLATELETS Thousands/uL 229 236   MCH pg 30 5 30 8   MCHC g/dL 32 0 33 2   RDW % 13 9 13 5   MPV fL 13 2* 12 8*   NRBC AUTO /100 WBCs  --  0         CMP:   Results from last 7 days   Lab Units 06/22/22  0540 06/21/22  2101   POTASSIUM mmol/L 4 7 3 9   CHLORIDE mmol/L 105 106   CO2 mmol/L 15* 24   BUN mg/dL 21 17   CREATININE mg/dL 1 87* 1 56*   CALCIUM mg/dL 8 6 9 2   AST U/L 36 20   ALT U/L 72 64   ALK PHOS U/L 70 75   EGFR ml/min/1 73sq m 40 50

## 2022-06-22 NOTE — H&P
[address] [place] [address]  H&P- Dario Comes 1970, 46 y o  male MRN: [id number]  Unit/Bed#: ED 28 Encounter: [id number]  Primary Care Provider: Kimberli Guillaume MD   Date and time admitted to hospital: 6/21/2022  8:39 PM    CHF (congestive heart failure) ([facility] [address] )  Assessment & Plan  Wt Readings from Last 3 Encounters:   11/16/21 124 kg (274 lb)   09/11/20 127 kg (279 lb)   06/30/20 127 kg (279 lb)     · No documented hx of CHF, no previous echos on file  · Daily weights   · Clinical hx of CHF given sx of AL, orthopnea, leg swelling, worsening SOB  · 40mg IV lasix x1  · goal negative -500 cc to -1L      Hyperlipidemia  Assessment & Plan  · Continue lipitor 10mg daily, check lipid panel in AM    [facility]  Assessment & Plan  · Presented in AF RVR with rates of 170, given cardizem bolus and started on gtt  · Given 5mg lopressor in ED with benefit  · Wean cardizem gtt as able, start IV lopressor 5mg q6h scheduled and consider transition to PO BB  · If unable to control rates effectively consider amio load  · TTE in AM  · Cardiology consult in AM      Hypertension  Assessment & Plan  · No medications at home however patient states in the past he was told he had high BP and did not want to start BP medications at this time  · Once off IV medications would benefit from PO BB        -------------------------------------------------------------------------------------------------------------  Chief Complaint: AF w/ RVR    History of Present Illness   HX and PE limited by: Chet Diamond is a 46 y o  male who presented to ED in AF w/ RVR with rates in 170s  Sx of palpitations and SOB x4 d at home, chronically experiences AL, orthopnea and intermittent leg swelling at home  No prior echos, does not see a cardiologist, sees PCP occasionally  Father with hx of MI, AF, CHF  Given 1L of crystalloid and cardizem bolus and gtt in ED  Given 5mg lopressor IV and 40mg Lasix IV   D dimer in ED elevated at 1 56,     PMHx:  HTN, 20 PPD smoking hx, HLD, and  hidradenitis suppurativa    History obtained from chart review and the patient   -------------------------------------------------------------------------------------------------------------  Dispo: Admit to Stepdown Level 2    Code Status: Level 1 - Full Code  --------------------------------------------------------------------------------------------------------------  Review of Systems     Review of Systems   Constitutional: Positive for fatigue  Negative for chills and fever  Respiratory: Positive for shortness of breath and wheezing  Cardiovascular: Positive for palpitations and leg swelling  Negative for chest pain  Gastrointestinal: Negative for abdominal distention, abdominal pain, nausea and vomiting  Genitourinary: Negative for difficulty urinating and dysuria  Musculoskeletal: Negative for arthralgias  Neurological: Positive for light-headedness  A 12-point, complete review of systems was reviewed and negative except as stated above     Physical Exam       Physical Exam  Constitutional:       General: He is in acute distress  Appearance: He is obese  He is diaphoretic  Interventions: Nasal cannula in place  HENT:      Head: Normocephalic and atraumatic  Eyes:      General: Lids are normal       Conjunctiva/sclera: Conjunctivae normal    Cardiovascular:      Rate and Rhythm: Tachycardia present  Rhythm irregularly irregular  Pulmonary:      Breath sounds: Normal breath sounds  Musculoskeletal:      Cervical back: Full passive range of motion without pain  Right lower le+ Pitting Edema present  Left lower le+ Pitting Edema present  Skin:     General: Skin is warm  Capillary Refill: Capillary refill takes less than 2 seconds  Neurological:      Mental Status: He is alert and oriented to person, place, and time  GCS: GCS eye subscore is 4  GCS verbal subscore is 5  GCS motor subscore is 6  --------------------------------------------------------------------------------------------------------------  Vitals:   Vitals:    06/22/22 0008 06/22/22 0015 06/22/22 0025 06/22/22 0037   BP: (!) 89/54 (!) 80/64 90/64 92/74   Pulse: (!) 124 (!) 121 (!) 127 (!) 124   Resp: (!) 29 (!) 34 (!) 31 (!) 30   Temp:       TempSrc:       SpO2: 94% 95% 95% 96%     Temp  Min: 99 5 °F (37 5 °C)  Max: 99 5 °F (37 5 °C)        There is no height or weight on file to calculate BMI  Laboratory and Diagnostics:  Results from last 7 days   Lab Units 06/21/22 2101   WBC Thousand/uL 10 24*   HEMOGLOBIN g/dL 14 0   HEMATOCRIT % 42 2   PLATELETS Thousands/uL 236   NEUTROS PCT % 61   MONOS PCT % 10     Results from last 7 days   Lab Units 06/21/22 2101   SODIUM mmol/L 142   POTASSIUM mmol/L 3 9   CHLORIDE mmol/L 106   CO2 mmol/L 24   ANION GAP mmol/L 12   BUN mg/dL 17   CREATININE mg/dL 1 56*   CALCIUM mg/dL 9 2   GLUCOSE RANDOM mg/dL 97   ALT U/L 64   AST U/L 20   ALK PHOS U/L 75   ALBUMIN g/dL 3 5   TOTAL BILIRUBIN mg/dL 0 59     Results from last 7 days   Lab Units 06/21/22 2101   MAGNESIUM mg/dL 2 4      Results from last 7 days   Lab Units 06/21/22 2101   INR  1 07          Results from last 7 days   Lab Units 06/21/22 2101   LACTIC ACID mmol/L 1 8     ABG:    VBG:  Results from last 7 days   Lab Units 06/21/22  2325   PH DANYEL  7 371   PCO2 DANYEL mm Hg 39 9*   PO2 DANYEL mm Hg 31 3*   HCO3 DANYEL mmol/L 22 6*   BASE EXC DANYEL mmol/L -2 4           Micro:        EKG:  AF w/ RVR, rate 140, RAD  Imaging: I have personally reviewed pertinent reports  Historical Information   Past Medical History:   Diagnosis Date    Chest pain 9/25/2014    Eczema     Hidradenitis suppurativa 2/14/2018    Rib pain 1/28/2015     History reviewed  No pertinent surgical history    Social History   Social History     Substance and Sexual Activity   Alcohol Use Yes    Comment: social; moderate     Social History     Substance and Sexual Activity Drug Use No     Social History     Tobacco Use   Smoking Status Former Smoker    Packs/day: 1 00    Years: 20 00    Pack years: 20 00   Smokeless Tobacco Former User     Exercise History: Ambulates without assistance at baseline  Family History:   Family History   Problem Relation Age of Onset    Mental illness Mother         denied    Substance Abuse Mother         denied    Mental illness Father         denied    Substance Abuse Father         denied    Heart disease Father         cardiac disorder     I have reviewed this patient's family history and commented on sigificant items within the HPI      Medications:  Current Facility-Administered Medications   Medication Dose Route Frequency    atorvastatin (LIPITOR) tablet 10 mg  10 mg Oral Daily With Dinner    heparin (porcine) 25,000 units in D5W 250 mL infusion (premix)  3-20 Units/kg/hr (Order-Specific) Intravenous Titrated    heparin (porcine) injection 2,000 Units  2,000 Units Intravenous Q1H PRN    heparin (porcine) injection 4,000 Units  4,000 Units Intravenous Q1H PRN    magnesium sulfate 2 g/50 mL IVPB (premix) 2 g  2 g Intravenous Once    metoprolol (LOPRESSOR) injection 2 5 mg  2 5 mg Intravenous Q6H PRN     Home medications:  Prior to Admission Medications   Prescriptions Last Dose Informant Patient Reported?  Taking?   atorvastatin (LIPITOR) 10 mg tablet   No No   Sig: Take 1 tablet (10 mg total) by mouth daily   Patient not taking: Reported on 11/16/2021    halobetasol (ULTRAVATE) 0 05 % cream   No No   Sig: Apply topically once for 1 dose   nicotine polacrilex (COMMIT) 4 MG lozenge 6/21/2022 at Unknown time  No Yes   Sig: Apply 1 lozenge (4 mg total) to the mouth or throat as needed for smoking cessation      Facility-Administered Medications: None     Allergies:  No Known Allergies    ------------------------------------------------------------------------------------------------------------  Advance Directive and Living Will: Power of :    POLST:    ------------------------------------------------------------------------------------------------------------  Anticipated Length of Stay is > 2 midnights    Care Time Delivered:   Upon my evaluation, this patient had a high probability of imminent or life-threatening deterioration due to AF RVR, hypotension, which required my direct attention, intervention, and personal management  I have personally provided 20 minutes (0000 to 0100) of critical care time, exclusive of procedures, teaching, family meetings, and any prior time recorded by providers other than myself  Ronaldo Yanez PA-C        Portions of the record may have been created with voice recognition software  Occasional wrong word or "sound a like" substitutions may have occurred due to the inherent limitations of voice recognition software    Read the chart carefully and recognize, using context, where substitutions have occurred

## 2022-06-22 NOTE — PROCEDURES
Arterial Line Insertion    Date/Time: 6/22/2022 6:29 AM  Performed by: Jayme Judd  Authorized by: Jayme Judd     Patient location:  ICU  Other Assisting Provider: No    Consent:     Consent obtained:  Verbal and emergent situation    Consent given by:  Patient  Universal protocol:     Required blood products, implants, devices, and special equipment available: yes      Site/side marked: yes      Patient identity confirmed:  Verbally with patient, hospital-assigned identification number and arm band  Indications:     Indications: hemodynamic monitoring, multiple ABGs and continuous blood pressure monitoring    Pre-procedure details:     Skin preparation:  Chlorhexidine    Preparation: Patient was prepped and draped in sterile fashion    Anesthesia (see MAR for exact dosages): Anesthesia method:  Local infiltration    Local anesthetic:  Lidocaine 1% w/o epi  Procedure details:     Location / Tip of Catheter:  Radial    Laterality:  Right    Santi's test performed: no      Needle gauge:  20 G    Placement technique:  Percutaneous    Number of attempts:  1    Successful placement: yes      Transducer: waveform confirmed    Post-procedure details:     Post-procedure:  Sterile dressing applied, sutured and wrist guard applied    CMS:  Unchanged and normal    Patient tolerance of procedure:   Tolerated well, no immediate complications

## 2022-06-22 NOTE — ASSESSMENT & PLAN NOTE
Wt Readings from Last 3 Encounters:   11/16/21 124 kg (274 lb)   09/11/20 127 kg (279 lb)   06/30/20 127 kg (279 lb)     · No documented hx of CHF, no previous echos on file  · NT BNP 2533, no previous records on file  · Daily weights   · Clinical hx of CHF given sx of AL, orthopnea, leg swelling, worsening SOB  · TTE 6/22- LVEF 35% with moderate global hypokinesis w/ hypokinesis of anterior wall with moderate to severe MR  · Given 60mg lasix in AM with good response, -1 6 L for day with 2L out in response  · goal negative -1L

## 2022-06-23 ENCOUNTER — APPOINTMENT (INPATIENT)
Dept: ULTRASOUND IMAGING | Facility: HOSPITAL | Age: 52
DRG: 286 | End: 2022-06-23
Payer: COMMERCIAL

## 2022-06-23 ENCOUNTER — APPOINTMENT (INPATIENT)
Dept: NON INVASIVE DIAGNOSTICS | Facility: HOSPITAL | Age: 52
DRG: 286 | End: 2022-06-23
Payer: COMMERCIAL

## 2022-06-23 LAB
ALBUMIN SERPL BCP-MCNC: 3.5 G/DL (ref 3.5–5)
ALP SERPL-CCNC: 69 U/L (ref 46–116)
ALT SERPL W P-5'-P-CCNC: 133 U/L (ref 12–78)
ANION GAP SERPL CALCULATED.3IONS-SCNC: 10 MMOL/L (ref 4–13)
ANION GAP SERPL CALCULATED.3IONS-SCNC: 14 MMOL/L (ref 4–13)
APTT PPP: 39 SECONDS (ref 23–37)
APTT PPP: 64 SECONDS (ref 23–37)
APTT PPP: 66 SECONDS (ref 23–37)
AST SERPL W P-5'-P-CCNC: 51 U/L (ref 5–45)
ATRIAL RATE: 107 BPM
ATRIAL RATE: 127 BPM
ATRIAL RATE: 150 BPM
ATRIAL RATE: 202 BPM
BASOPHILS # BLD AUTO: 0.11 THOUSANDS/ΜL (ref 0–0.1)
BASOPHILS NFR BLD AUTO: 1 % (ref 0–1)
BILIRUB SERPL-MCNC: 0.47 MG/DL (ref 0.2–1)
BUN SERPL-MCNC: 20 MG/DL (ref 5–25)
BUN SERPL-MCNC: 21 MG/DL (ref 5–25)
CA-I BLD-SCNC: 1.13 MMOL/L (ref 1.12–1.32)
CALCIUM SERPL-MCNC: 8.5 MG/DL (ref 8.3–10.1)
CALCIUM SERPL-MCNC: 8.7 MG/DL (ref 8.3–10.1)
CARDIAC TROPONIN I PNL SERPL HS: 65 NG/L (ref 8–18)
CARDIAC TROPONIN I PNL SERPL HS: 73 NG/L (ref 8–18)
CHLORIDE SERPL-SCNC: 102 MMOL/L (ref 100–108)
CHLORIDE SERPL-SCNC: 104 MMOL/L (ref 100–108)
CHOLEST SERPL-MCNC: 118 MG/DL
CO2 SERPL-SCNC: 21 MMOL/L (ref 21–32)
CO2 SERPL-SCNC: 25 MMOL/L (ref 21–32)
CREAT SERPL-MCNC: 1.43 MG/DL (ref 0.6–1.3)
CREAT SERPL-MCNC: 1.58 MG/DL (ref 0.6–1.3)
CREAT UR-MCNC: 96.6 MG/DL
EOSINOPHIL # BLD AUTO: 0.01 THOUSAND/ΜL (ref 0–0.61)
EOSINOPHIL NFR BLD AUTO: 0 % (ref 0–6)
ERYTHROCYTE [DISTWIDTH] IN BLOOD BY AUTOMATED COUNT: 13.9 % (ref 11.6–15.1)
GFR SERPL CREATININE-BSD FRML MDRD: 49 ML/MIN/1.73SQ M
GFR SERPL CREATININE-BSD FRML MDRD: 56 ML/MIN/1.73SQ M
GLUCOSE SERPL-MCNC: 103 MG/DL (ref 65–140)
GLUCOSE SERPL-MCNC: 106 MG/DL (ref 65–140)
GLUCOSE SERPL-MCNC: 108 MG/DL (ref 65–140)
GLUCOSE SERPL-MCNC: 129 MG/DL (ref 65–140)
GLUCOSE SERPL-MCNC: 133 MG/DL (ref 65–140)
GLUCOSE SERPL-MCNC: 167 MG/DL (ref 65–140)
HCT VFR BLD AUTO: 35.3 % (ref 36.5–49.3)
HDLC SERPL-MCNC: 34 MG/DL
HEPARIN CF II AG PPP IA-MCNC: 0.64 IU/ML
HGB BLD-MCNC: 11.9 G/DL (ref 12–17)
IMM GRANULOCYTES # BLD AUTO: 0.1 THOUSAND/UL (ref 0–0.2)
IMM GRANULOCYTES NFR BLD AUTO: 1 % (ref 0–2)
LDLC SERPL CALC-MCNC: 69 MG/DL (ref 0–100)
LYMPHOCYTES # BLD AUTO: 1.25 THOUSANDS/ΜL (ref 0.6–4.47)
LYMPHOCYTES NFR BLD AUTO: 9 % (ref 14–44)
MAGNESIUM SERPL-MCNC: 2.5 MG/DL (ref 1.6–2.6)
MCH RBC QN AUTO: 31.2 PG (ref 26.8–34.3)
MCHC RBC AUTO-ENTMCNC: 33.7 G/DL (ref 31.4–37.4)
MCV RBC AUTO: 92 FL (ref 82–98)
MONOCYTES # BLD AUTO: 1.29 THOUSAND/ΜL (ref 0.17–1.22)
MONOCYTES NFR BLD AUTO: 9 % (ref 4–12)
MV EROA: 0.1 CM2
MV REGURGITANT VOLUME: 14
NEUTROPHILS # BLD AUTO: 11.45 THOUSANDS/ΜL (ref 1.85–7.62)
NEUTS SEG NFR BLD AUTO: 80 % (ref 43–75)
NRBC BLD AUTO-RTO: 0 /100 WBCS
PHOSPHATE SERPL-MCNC: 4.2 MG/DL (ref 2.7–4.5)
PISA MRMAX VEL: 0.17 M/S
PISA RADIUS: 0.7 CM
PLATELET # BLD AUTO: 193 THOUSANDS/UL (ref 149–390)
PMV BLD AUTO: 13.2 FL (ref 8.9–12.7)
POTASSIUM SERPL-SCNC: 3.5 MMOL/L (ref 3.5–5.3)
POTASSIUM SERPL-SCNC: 4 MMOL/L (ref 3.5–5.3)
PROCALCITONIN SERPL-MCNC: 0.28 NG/ML
PROT SERPL-MCNC: 7.5 G/DL (ref 6.4–8.2)
QRS AXIS: 67 DEGREES
QRS AXIS: 85 DEGREES
QRS AXIS: 88 DEGREES
QRS AXIS: 90 DEGREES
QRSD INTERVAL: 76 MS
QRSD INTERVAL: 84 MS
QRSD INTERVAL: 84 MS
QRSD INTERVAL: 86 MS
QT INTERVAL: 270 MS
QT INTERVAL: 282 MS
QT INTERVAL: 288 MS
QT INTERVAL: 326 MS
QTC INTERVAL: 391 MS
QTC INTERVAL: 444 MS
QTC INTERVAL: 470 MS
QTC INTERVAL: 485 MS
RBC # BLD AUTO: 3.82 MILLION/UL (ref 3.88–5.62)
SODIUM SERPL-SCNC: 137 MMOL/L (ref 136–145)
SODIUM SERPL-SCNC: 139 MMOL/L (ref 136–145)
T WAVE AXIS: 110 DEGREES
T WAVE AXIS: 115 DEGREES
T WAVE AXIS: 90 DEGREES
T WAVE AXIS: 99 DEGREES
TRIGL SERPL-MCNC: 77 MG/DL
UUN 24H UR-MCNC: 602 MG/DL
VENTRICULAR RATE: 112 BPM
VENTRICULAR RATE: 126 BPM
VENTRICULAR RATE: 167 BPM
VENTRICULAR RATE: 171 BPM
WBC # BLD AUTO: 14.21 THOUSAND/UL (ref 4.31–10.16)

## 2022-06-23 PROCEDURE — 93320 DOPPLER ECHO COMPLETE: CPT | Performed by: INTERNAL MEDICINE

## 2022-06-23 PROCEDURE — 92960 CARDIOVERSION ELECTRIC EXT: CPT

## 2022-06-23 PROCEDURE — 93005 ELECTROCARDIOGRAM TRACING: CPT

## 2022-06-23 PROCEDURE — 80053 COMPREHEN METABOLIC PANEL: CPT

## 2022-06-23 PROCEDURE — 93312 ECHO TRANSESOPHAGEAL: CPT | Performed by: INTERNAL MEDICINE

## 2022-06-23 PROCEDURE — 76705 ECHO EXAM OF ABDOMEN: CPT

## 2022-06-23 PROCEDURE — 99233 SBSQ HOSP IP/OBS HIGH 50: CPT | Performed by: INTERNAL MEDICINE

## 2022-06-23 PROCEDURE — 85730 THROMBOPLASTIN TIME PARTIAL: CPT

## 2022-06-23 PROCEDURE — 93325 DOPPLER ECHO COLOR FLOW MAPG: CPT | Performed by: INTERNAL MEDICINE

## 2022-06-23 PROCEDURE — 84484 ASSAY OF TROPONIN QUANT: CPT | Performed by: NURSE PRACTITIONER

## 2022-06-23 PROCEDURE — 85520 HEPARIN ASSAY: CPT | Performed by: INTERNAL MEDICINE

## 2022-06-23 PROCEDURE — 80048 BASIC METABOLIC PNL TOTAL CA: CPT

## 2022-06-23 PROCEDURE — 82330 ASSAY OF CALCIUM: CPT

## 2022-06-23 PROCEDURE — 82948 REAGENT STRIP/BLOOD GLUCOSE: CPT

## 2022-06-23 PROCEDURE — 80061 LIPID PANEL: CPT

## 2022-06-23 PROCEDURE — 83735 ASSAY OF MAGNESIUM: CPT

## 2022-06-23 PROCEDURE — 92960 CARDIOVERSION ELECTRIC EXT: CPT | Performed by: INTERNAL MEDICINE

## 2022-06-23 PROCEDURE — 93312 ECHO TRANSESOPHAGEAL: CPT

## 2022-06-23 PROCEDURE — 85730 THROMBOPLASTIN TIME PARTIAL: CPT | Performed by: INTERNAL MEDICINE

## 2022-06-23 PROCEDURE — 85025 COMPLETE CBC W/AUTO DIFF WBC: CPT

## 2022-06-23 PROCEDURE — 84145 PROCALCITONIN (PCT): CPT

## 2022-06-23 PROCEDURE — 99232 SBSQ HOSP IP/OBS MODERATE 35: CPT | Performed by: INTERNAL MEDICINE

## 2022-06-23 PROCEDURE — 84100 ASSAY OF PHOSPHORUS: CPT

## 2022-06-23 PROCEDURE — 84540 ASSAY OF URINE/UREA-N: CPT

## 2022-06-23 PROCEDURE — 82570 ASSAY OF URINE CREATININE: CPT

## 2022-06-23 PROCEDURE — 93010 ELECTROCARDIOGRAM REPORT: CPT | Performed by: INTERNAL MEDICINE

## 2022-06-23 PROCEDURE — 84484 ASSAY OF TROPONIN QUANT: CPT

## 2022-06-23 PROCEDURE — B24BZZ4 ULTRASONOGRAPHY OF HEART WITH AORTA, TRANSESOPHAGEAL: ICD-10-PCS | Performed by: INTERNAL MEDICINE

## 2022-06-23 RX ORDER — AMIODARONE HYDROCHLORIDE 200 MG/1
400 TABLET ORAL 2 TIMES DAILY WITH MEALS
Status: DISCONTINUED | OUTPATIENT
Start: 2022-06-23 | End: 2022-06-28

## 2022-06-23 RX ORDER — ONDANSETRON 2 MG/ML
4 INJECTION INTRAMUSCULAR; INTRAVENOUS ONCE AS NEEDED
Status: CANCELLED | OUTPATIENT
Start: 2022-06-23

## 2022-06-23 RX ORDER — PHENYLEPHRINE HYDROCHLORIDE 10 MG/ML
INJECTION INTRAVENOUS AS NEEDED
Status: DISCONTINUED | OUTPATIENT
Start: 2022-06-23 | End: 2022-06-23

## 2022-06-23 RX ORDER — PROMETHAZINE HYDROCHLORIDE 25 MG/ML
12.5 INJECTION, SOLUTION INTRAMUSCULAR; INTRAVENOUS ONCE AS NEEDED
Status: CANCELLED | OUTPATIENT
Start: 2022-06-23

## 2022-06-23 RX ORDER — METOPROLOL TARTRATE 5 MG/5ML
5 INJECTION INTRAVENOUS EVERY 6 HOURS PRN
Status: DISCONTINUED | OUTPATIENT
Start: 2022-06-23 | End: 2022-06-28 | Stop reason: HOSPADM

## 2022-06-23 RX ORDER — LORAZEPAM 2 MG/ML
2 INJECTION INTRAMUSCULAR ONCE
Status: COMPLETED | OUTPATIENT
Start: 2022-06-23 | End: 2022-06-23

## 2022-06-23 RX ORDER — LIDOCAINE HYDROCHLORIDE 20 MG/ML
INJECTION, SOLUTION EPIDURAL; INFILTRATION; INTRACAUDAL; PERINEURAL AS NEEDED
Status: DISCONTINUED | OUTPATIENT
Start: 2022-06-23 | End: 2022-06-23

## 2022-06-23 RX ORDER — FUROSEMIDE 10 MG/ML
60 INJECTION INTRAMUSCULAR; INTRAVENOUS ONCE
Status: COMPLETED | OUTPATIENT
Start: 2022-06-23 | End: 2022-06-23

## 2022-06-23 RX ORDER — HYDROMORPHONE HCL IN WATER/PF 6 MG/30 ML
0.2 PATIENT CONTROLLED ANALGESIA SYRINGE INTRAVENOUS EVERY 4 HOURS PRN
Status: DISCONTINUED | OUTPATIENT
Start: 2022-06-23 | End: 2022-06-24

## 2022-06-23 RX ORDER — CARVEDILOL 3.12 MG/1
3.12 TABLET ORAL 2 TIMES DAILY WITH MEALS
Status: DISCONTINUED | OUTPATIENT
Start: 2022-06-23 | End: 2022-06-24

## 2022-06-23 RX ORDER — ALBUTEROL SULFATE 2.5 MG/3ML
2.5 SOLUTION RESPIRATORY (INHALATION) ONCE AS NEEDED
Status: CANCELLED | OUTPATIENT
Start: 2022-06-23

## 2022-06-23 RX ORDER — ACETAMINOPHEN 325 MG/1
975 TABLET ORAL EVERY 8 HOURS PRN
Status: DISCONTINUED | OUTPATIENT
Start: 2022-06-23 | End: 2022-06-28 | Stop reason: HOSPADM

## 2022-06-23 RX ORDER — OXYCODONE HYDROCHLORIDE 5 MG/1
5 TABLET ORAL EVERY 4 HOURS PRN
Status: DISCONTINUED | OUTPATIENT
Start: 2022-06-23 | End: 2022-06-24

## 2022-06-23 RX ORDER — PROPOFOL 10 MG/ML
INJECTION, EMULSION INTRAVENOUS AS NEEDED
Status: DISCONTINUED | OUTPATIENT
Start: 2022-06-23 | End: 2022-06-23

## 2022-06-23 RX ORDER — OXAZEPAM 15 MG/1
15 CAPSULE ORAL EVERY 6 HOURS SCHEDULED
Status: DISCONTINUED | OUTPATIENT
Start: 2022-06-24 | End: 2022-06-23

## 2022-06-23 RX ORDER — SODIUM CHLORIDE, SODIUM LACTATE, POTASSIUM CHLORIDE, CALCIUM CHLORIDE 600; 310; 30; 20 MG/100ML; MG/100ML; MG/100ML; MG/100ML
INJECTION, SOLUTION INTRAVENOUS CONTINUOUS PRN
Status: DISCONTINUED | OUTPATIENT
Start: 2022-06-23 | End: 2022-06-23

## 2022-06-23 RX ORDER — LABETALOL HYDROCHLORIDE 5 MG/ML
5 INJECTION, SOLUTION INTRAVENOUS
Status: CANCELLED | OUTPATIENT
Start: 2022-06-23

## 2022-06-23 RX ORDER — OXYCODONE HYDROCHLORIDE 5 MG/1
2.5 TABLET ORAL EVERY 4 HOURS PRN
Status: DISCONTINUED | OUTPATIENT
Start: 2022-06-23 | End: 2022-06-24

## 2022-06-23 RX ORDER — OXAZEPAM 10 MG
30 CAPSULE ORAL EVERY 6 HOURS SCHEDULED
Status: DISCONTINUED | OUTPATIENT
Start: 2022-06-23 | End: 2022-06-23

## 2022-06-23 RX ADMIN — OXYCODONE HYDROCHLORIDE 2.5 MG: 5 TABLET ORAL at 02:25

## 2022-06-23 RX ADMIN — HEPARIN SODIUM AND DEXTROSE 23.1 UNITS/KG/HR: 10000; 5 INJECTION INTRAVENOUS at 22:02

## 2022-06-23 RX ADMIN — AMIODARONE HYDROCHLORIDE 400 MG: 200 TABLET ORAL at 18:19

## 2022-06-23 RX ADMIN — THIAMINE HCL TAB 100 MG 100 MG: 100 TAB at 08:57

## 2022-06-23 RX ADMIN — METOPROLOL TARTRATE 25 MG: 25 TABLET, FILM COATED ORAL at 10:20

## 2022-06-23 RX ADMIN — LIDOCAINE HYDROCHLORIDE 100 MG: 20 INJECTION, SOLUTION EPIDURAL; INFILTRATION; INTRACAUDAL at 16:24

## 2022-06-23 RX ADMIN — PROPOFOL 50 MG: 10 INJECTION, EMULSION INTRAVENOUS at 16:33

## 2022-06-23 RX ADMIN — FUROSEMIDE 60 MG: 10 INJECTION, SOLUTION INTRAMUSCULAR; INTRAVENOUS at 10:19

## 2022-06-23 RX ADMIN — CARVEDILOL 3.12 MG: 3.12 TABLET, FILM COATED ORAL at 18:19

## 2022-06-23 RX ADMIN — LORAZEPAM 2 MG: 2 INJECTION INTRAMUSCULAR; INTRAVENOUS at 09:06

## 2022-06-23 RX ADMIN — DIGOXIN 250 MCG: 0.25 INJECTION INTRAMUSCULAR; INTRAVENOUS at 00:19

## 2022-06-23 RX ADMIN — PHENYLEPHRINE HYDROCHLORIDE 100 MCG: 10 INJECTION INTRAVENOUS at 16:29

## 2022-06-23 RX ADMIN — HEPARIN SODIUM 4000 UNITS: 1000 INJECTION INTRAVENOUS; SUBCUTANEOUS at 08:57

## 2022-06-23 RX ADMIN — TOPICAL ANESTHETIC 1 SPRAY: 200 SPRAY DENTAL; PERIODONTAL at 16:23

## 2022-06-23 RX ADMIN — HYDROMORPHONE HYDROCHLORIDE 0.2 MG: 0.2 INJECTION, SOLUTION INTRAMUSCULAR; INTRAVENOUS; SUBCUTANEOUS at 03:47

## 2022-06-23 RX ADMIN — ATORVASTATIN CALCIUM 10 MG: 10 TABLET, FILM COATED ORAL at 18:21

## 2022-06-23 RX ADMIN — PROPOFOL 120 MG: 10 INJECTION, EMULSION INTRAVENOUS at 16:24

## 2022-06-23 RX ADMIN — HYDROMORPHONE HYDROCHLORIDE 0.2 MG: 0.2 INJECTION, SOLUTION INTRAMUSCULAR; INTRAVENOUS; SUBCUTANEOUS at 09:08

## 2022-06-23 RX ADMIN — DIGOXIN 250 MCG: 0.25 INJECTION INTRAMUSCULAR; INTRAVENOUS at 05:28

## 2022-06-23 RX ADMIN — Medication 1 TABLET: at 08:57

## 2022-06-23 RX ADMIN — AMIODARONE HYDROCHLORIDE 0.5 MG/MIN: 50 INJECTION, SOLUTION INTRAVENOUS at 12:07

## 2022-06-23 RX ADMIN — OXYCODONE HYDROCHLORIDE 5 MG: 5 TABLET ORAL at 06:41

## 2022-06-23 RX ADMIN — INSULIN LISPRO 1 UNITS: 100 INJECTION, SOLUTION INTRAVENOUS; SUBCUTANEOUS at 08:57

## 2022-06-23 RX ADMIN — FOLIC ACID 1 MG: 1 TABLET ORAL at 08:57

## 2022-06-23 RX ADMIN — PROPOFOL 50 MG: 10 INJECTION, EMULSION INTRAVENOUS at 16:29

## 2022-06-23 RX ADMIN — METOROPROLOL TARTRATE 5 MG: 5 INJECTION, SOLUTION INTRAVENOUS at 08:57

## 2022-06-23 RX ADMIN — CEFTRIAXONE 2000 MG: 2 INJECTION, POWDER, FOR SOLUTION INTRAMUSCULAR; INTRAVENOUS at 02:34

## 2022-06-23 RX ADMIN — HEPARIN SODIUM AND DEXTROSE 23.1 UNITS/KG/HR: 10000; 5 INJECTION INTRAVENOUS at 10:19

## 2022-06-23 RX ADMIN — PROPOFOL 30 MG: 10 INJECTION, EMULSION INTRAVENOUS at 16:25

## 2022-06-23 RX ADMIN — LORAZEPAM 2 MG: 2 INJECTION INTRAMUSCULAR; INTRAVENOUS at 03:23

## 2022-06-23 RX ADMIN — SODIUM CHLORIDE, SODIUM LACTATE, POTASSIUM CHLORIDE, AND CALCIUM CHLORIDE: .6; .31; .03; .02 INJECTION, SOLUTION INTRAVENOUS at 16:15

## 2022-06-23 NOTE — ANESTHESIA POSTPROCEDURE EVALUATION
Post-Op Assessment Note    CV Status:  Stable  Pain Score: 0    Pain management: adequate     Mental Status:  Awake and sleepy   Hydration Status:  Euvolemic   PONV Controlled:  Controlled   Airway Patency:  Patent and adequate      Post Op Vitals Reviewed: Yes      Staff: CRNA         No complications documented      BP   96/68   Temp      Pulse 122   Resp   22   SpO2   96

## 2022-06-23 NOTE — ASSESSMENT & PLAN NOTE
· Hx of alcohol abuse, per pt he significantly cut back on drinking and now goes months without drinking and occasionally has 2-3 mixed drinks (2 shots of vodka in cocktail) 2-3 times a week if he is drinking     · Last drink was on last Wednesday per patient  · Continue CIWA

## 2022-06-23 NOTE — PROGRESS NOTES
Cardiology Progress Note - Aura Person 46 y o  male MRN: [id number]    Unit/Bed#:  Encounter: [id number]      Assessment/Plan:  1  New onset of atrial fibrillation with RVR, heart rate initially 170s now 119, discontinue amiodarone drip, transition over to oral amiodarone, patient did receive metoprolol and digoxin overnight  Still tachycardic  Echocardiogram done EF 35%  Continue heparin drip  Discussed with pt yesterday about MIKO/cardioversion try to give out of sinus rhythm and to assess for thrombus  Patient is agreeable  Will try and get this done today  Will need to discuss oral anticoagulation closer to discharge, [id number] =1 (chf)    2  Acute systolic heart failure, EF 35%  Critical care using diuretics as needed  Daily weights  Salt restriction  Strict I&Os  Patient is currently off of pressors  Net -1 7 L      3  Cardiomyopathy, unclear if ischemic versus nonischemic versus tachycardia mediated, EF 35%  Patient will need ischemic evaluation closer to discharge  Continue Lipitor  Hypotension previously precluded adding standing doses of beta-blockers as well as Ace inhibitor, blood pressure now improved  Will slowly introduce, will start coreg 3 15mg bid, liokely add ACEi/ARNi tomorrow  4  Pneumonia, on antibiotics, management per critical care    5  Hypotension now resolved, off of pressor support  Continue to monitor, last blood pressure 154/85  6  Acute kidney injury on admission, creatinine today 1 5  Continue to monitor closely  7  Hyperlipidemia, continue with statins  Subjective:   Patient seen and examined  No significant events overnight  Patient remains tachycardic, patient just received Dilaudid for atypical chest pain  Patient currently sleeping, appears comfortable  Objective:     Vitals: Blood pressure 154/85, pulse (!) 119, temperature 98 2 °F (36 8 °C), temperature source Oral, resp   rate 18, height 6' 2" (1 88 m), weight 127 kg (279 lb), SpO2 95 % , Body mass index is 35 82 kg/m² ,   Orthostatic Blood Pressures    Flowsheet Row Most Recent Value   Blood Pressure 154/85 filed at 06/23/2022 1500   Patient Position - Orthostatic VS Lying filed at 06/23/2022 1500            Intake/Output Summary (Last 24 hours) at 6/23/2022 1536  Last data filed at 6/23/2022 1305  Gross per 24 hour   Intake 1869 05 ml   Output 1750 ml   Net 119 05 ml         Physical Exam:    GEN: Raul Bueno appears well,sleeping, pleasant and cooperative   HEENT: pupils equal, round, and reactive to light; extraocular muscles intact  NECK: supple, no carotid bruits   HEART:  Irregularly irregular tachycardic, normal S1 and S2, no murmurs, clicks, gallops or rubs   LUNGS:  Decreased breath sounds bilaterally; no wheezes, rales, or rhonchi   ABDOMEN: normal bowel sounds, soft, no tenderness, no distention  EXTREMITIES: peripheral pulses normal; no clubbing, cyanosis, or edema      Medications:      Current Facility-Administered Medications:     acetaminophen (TYLENOL) tablet 975 mg, 975 mg, Oral, Q8H PRN, Adela Jensen    amiodarone (CORDARONE) 900 mg in dextrose 5 % 500 mL infusion, 0 5 mg/min, Intravenous, Continuous, JEROD Price, Last Rate: 16 7 mL/hr at 06/23/22 1207, 0 5 mg/min at 06/23/22 1207    amiodarone tablet 400 mg, 400 mg, Oral, BID With Meals, Stuart Roca PA-C    atorvastatin (LIPITOR) tablet 10 mg, 10 mg, Oral, Daily With Dinner, Adela Jensen, 10 mg at 06/22/22 1802    cefTRIAXone (ROCEPHIN) 2,000 mg in dextrose 5 % 50 mL IVPB, 2,000 mg, Intravenous, Q24H, Adela Styles, Last Rate: 100 mL/hr at 06/23/22 0234, 2,000 mg at 40/31/96 2465    folic acid (FOLVITE) tablet 1 mg, 1 mg, Oral, Daily, HUMPHREY PriceNP, 1 mg at 06/23/22 0857    heparin (porcine) 25,000 units in D5W 250 mL infusion (premix), 3-20 Units/kg/hr (Order-Specific), Intravenous, Titrated, Marcene Duck, DO, Last Rate: 20 8 mL/hr at 06/23/22 1019, 23 1 Units/kg/hr at 06/23/22 1019    heparin (porcine) injection 2,000 Units, 2,000 Units, Intravenous, Q1H PRN, Ulises Roberts, DO    heparin (porcine) injection 4,000 Units, 4,000 Units, Intravenous, Q1H PRN, Ulises Roberts, DO, 4,000 Units at 06/23/22 0857    HYDROmorphone HCl (DILAUDID) injection 0 2 mg, 0 2 mg, Intravenous, Q4H PRN, Corina Bolster, 0 2 mg at 06/23/22 0908    insulin lispro (HumaLOG) 100 units/mL subcutaneous injection 1-5 Units, 1-5 Units, Subcutaneous, TID AC, 1 Units at 06/23/22 0857 **AND** Fingerstick Glucose (POCT), , , TID AC, JEROD Walker    insulin lispro (HumaLOG) 100 units/mL subcutaneous injection 1-5 Units, 1-5 Units, Subcutaneous, HS, JEROD Walker    metoprolol (LOPRESSOR) injection 5 mg, 5 mg, Intravenous, Q6H PRN, JEROD Walker, 5 mg at 06/23/22 0857    multivitamin-minerals (CENTRUM) tablet 1 tablet, 1 tablet, Oral, Daily, JEROD Walker, 1 tablet at 06/23/22 0857    oxyCODONE (ROXICODONE) IR tablet 2 5 mg, 2 5 mg, Oral, Q4H PRN, Corina Bolster, 2 5 mg at 06/23/22 0225    oxyCODONE (ROXICODONE) IR tablet 5 mg, 5 mg, Oral, Q4H PRN, Corina Bolster, 5 mg at 06/23/22 2720    thiamine tablet 100 mg, 100 mg, Oral, Daily, JEROD Walker, 100 mg at 06/23/22 0857     Labs & Results:        Results from last 7 days   Lab Units 06/23/22  0526 06/22/22  0540 06/21/22  2101   WBC Thousand/uL 14 21* 10 70* 10 24*   HEMOGLOBIN g/dL 11 9* 13 0 14 0   HEMATOCRIT % 35 3* 40 6 42 2   PLATELETS Thousands/uL 193 229 236     Results from last 7 days   Lab Units 06/23/22  0526   TRIGLYCERIDES mg/dL 77   HDL mg/dL 34*     Results from last 7 days   Lab Units 06/23/22  0526 06/22/22  1811 06/22/22  1328 06/22/22  0540 06/21/22  2101   POTASSIUM mmol/L 4 0 3 8 4 0 4 7 3 9   CHLORIDE mmol/L 104 105 106 105 106   CO2 mmol/L 21 18* 18* 15* 24   BUN mg/dL 21 21 21 21 17   CREATININE mg/dL 1 58* 1 52* 1 51* 1 87* 1 56*   CALCIUM mg/dL 8 5 8 4 8 7 8 6 9 2   ALK PHOS U/L 69  --   -- 70 75   ALT U/L 133*  --   --  72 64   AST U/L 51*  --   --  36 20     Results from last 7 days   Lab Units 06/23/22  1258 06/23/22  0526 06/22/22  1811 06/22/22  0138 06/21/22  2101   INR   --   --   --   --  1 07   PTT seconds 66* 39* 41*   < >  --     < > = values in this interval not displayed       Results from last 7 days   Lab Units 06/23/22  0526 06/22/22  0540 06/21/22  2101   MAGNESIUM mg/dL 2 5 2 8* 2 4

## 2022-06-23 NOTE — DISCHARGE INSTRUCTIONS
Cardioversion   WHAT YOU NEED TO KNOW:   Cardioversion is a procedure that uses medicine or electrical shocks to correct arrhythmias  An arrhythmias is a heartbeat that is too slow, too fast, or irregular  It may prevent your body from getting the blood and oxygen it needs  Your heart has 4 chambers, called the atria and ventricles  The atria are at the top of your heart, and the ventricles are at the bottom of your heart  Most arrhythmias that need cardioversion start in the atria  DISCHARGE INSTRUCTIONS:   Call 911 for any of the following: You have any of the following signs of a heart attack:      Squeezing, pressure, or pain in your chest    You may  also have any of the following:     Discomfort or pain in your back, neck, jaw, stomach, or arm    Shortness of breath    Nausea or vomiting    Lightheadedness or a sudden cold sweat    You have any of the following signs of a stroke:      Numbness or drooping on one side of your face     Weakness in an arm or leg    Confusion or difficulty speaking    Dizziness, a severe headache, or vision loss    You feel lightheaded, short of breath, and have chest pain  You cough up blood  You have trouble breathing  Seek care immediately if:   You feel your heart beating fast or fluttering  You feel weak or faint  Your leg or arm is larger than usual, painful, and warm  Contact your healthcare provider if:   Your skin is itchy, swollen, or you have a rash  You have questions or concerns about your condition or care  Medicines: You may need any of the following:  Heart medicines  help control your heart rate and rhythm  Blood thinners  help prevent blood clots  Clots can cause strokes, heart attacks, and death  The following are general safety guidelines to follow while you are taking a blood thinner:    Watch for bleeding and bruising while you take blood thinners  Watch for bleeding from your gums or nose   Watch for blood in your urine and bowel movements  Use a soft washcloth on your skin, and a soft toothbrush to brush your teeth  This can keep your skin and gums from bleeding  If you shave, use an electric shaver  Do not play contact sports  Tell your dentist and other healthcare providers that you take a blood thinner  Wear a bracelet or necklace that says you take this medicine  Do not start or stop any other medicines unless your healthcare provider tells you to  Many medicines cannot be used with blood thinners  Take your blood thinner exactly as prescribed by your healthcare provider  Do not skip does or take less than prescribed  Tell your provider right away if you forget to take your blood thinner, or if you take too much  Warfarin  is a blood thinner that you may need to take  The following are things you should be aware of if you take warfarin:     Foods and medicines can affect the amount of warfarin in your blood  Do not make major changes to your diet while you take warfarin  Warfarin works best when you eat about the same amount of vitamin K every day  Vitamin K is found in green leafy vegetables and certain other foods  Ask for more information about what to eat when you are taking warfarin  You will need to see your healthcare provider for follow-up visits when you are on warfarin  You will need regular blood tests  These tests are used to decide how much medicine you need  Take your medicine as directed  Contact your healthcare provider if you think your medicine is not helping or if you have side effects  Tell him or her if you are allergic to any medicine  Keep a list of the medicines, vitamins, and herbs you take  Include the amounts, and when and why you take them  Bring the list or the pill bottles to follow-up visits  Carry your medicine list with you in case of an emergency  Self-care:   Rest as directed  Do not drive for at least 24 hours   Ask your healthcare provider when you can return to your normal activities  Check your heart rate and blood pressure as directed  Ask your healthcare provider what your heart rate and blood pressure should be  Do not smoke  Nicotine and other chemicals in cigarettes and cigars can cause heart and lung damage  They can also increase your risk for another arrhythmia  Ask your healthcare provider for information if you currently smoke and need help to quit  E-cigarettes or smokeless tobacco still contain nicotine  Talk to your healthcare provider before you use these products  Eat heart healthy foods  These include fruits, vegetables, whole-grain breads, low-fat dairy products, beans, lean meats, and fish  Replace butter and margarine with heart-healthy oils such as olive oil and canola oil  Maintain a healthy weight  Ask your healthcare provider how much you should weigh  Ask him to help you create a weight loss plan if you are overweight  Follow up with your doctor as directed:  Write down your questions so you remember to ask them during your visits  © [employer] 2022 Information is for End User's use only and may not be sold, redistributed or otherwise used for commercial purposes  All illustrations and images included in CareNotes® are the copyrighted property of A [employer] M , Inc  or [address] [place] [address]  The above information is an  only  It is not intended as medical advice for individual conditions or treatments  Talk to your doctor, nurse or pharmacist before following any medical regimen to see if it is safe and effective for you  Transesophageal Echocardiogram   WHAT YOU NEED TO KNOW:   A transesophageal echocardiogram (MIKO) is a procedure used to check for problems with your heart  It will also show any problems in the blood vessels near your heart  Sound waves are sent to the heart through a tube inserted into your throat   The sound waves show the structure and function of your heart through pictures on a monitor  DISCHARGE INSTRUCTIONS:   Call your local emergency number (911 in the [address]) if:   You have any of the following signs of a heart attack:      Squeezing, pressure, or pain in your chest    You may  also have any of the following:     Discomfort or pain in your back, neck, jaw, stomach, or arm    Shortness of breath    Nausea or vomiting    Lightheadedness or a sudden cold sweat      Call your doctor or cardiologist if:   You have a fever or chills  You taste blood  You have a severe sore throat or trouble swallowing  You have questions or concerns about your condition or care  Do not eat or drink anything until you are able to swallow normally:  Start with soft foods, such as oatmeal, yogurt, or applesauce  When you can eat soft foods easily, you may begin to eat solid foods  What you can do to keep your heart healthy:   Eat heart-healthy foods  Eat whole grains, fruits, and vegetables every day  Limit salt and high-fat foods  Ask your healthcare provider for more information on a heart-healthy diet  Exercise as directed  Your healthcare provider may suggest an exercise program to help improve your heart health  It is best to start slowly, and do more as you get stronger  Do not start an exercise program without talking with your healthcare provider  Maintain a healthy weight  Keep a healthy weight so your heart does not have to work so hard  Ask what weight is healthy for you  Your healthcare provider can help you create a safe weight-loss plan, if needed  Manage your medical conditions  High blood pressure, high cholesterol, and diabetes can lead to heart problems  Work with your healthcare provider to manage your medical conditions and decrease your risk for heart problems  Do not smoke  Nicotine and other chemicals in cigarettes and cigars can cause lung damage  Ask your healthcare provider for information if you currently smoke and need help to quit   E-cigarettes or smokeless tobacco still contain nicotine  Talk to your healthcare provider before you use these products  Follow up with your doctor or cardiologist as directed:  Write down your questions so you remember to ask them during your visits  © Copyright [employer] 2022 Information is for End User's use only and may not be sold, redistributed or otherwise used for commercial purposes  All illustrations and images included in CareNotes® are the copyrighted property of [employer] Inc  or [facility] Andrew Aponte   The above information is an  only  It is not intended as medical advice for individual conditions or treatments  Talk to your doctor, nurse or pharmacist before following any medical regimen to see if it is safe and effective for you

## 2022-06-23 NOTE — UTILIZATION REVIEW
Continued Stay Review    Date: 6/23                          Current Patient Class: INpatient   Current Level of Care: CRITICAL CARE    HPI:51 y o  male initially admitted on 6/21     Assessment/Plan: Tolerating amiodarone drip with improved bp  Levophed discontinued  Rates continue to rise  Given IV lopressor x 2 doses, and rates remain >150  Loaded with IV digoxin  Persistent chest pain  EKG showing resolution lateral TWI   ECHO shows EF 35%  Possible MIKO/cardioversion if heart rate remains uncontrolled  Continue with IV lasix, IV abx, Heparin drip  WBCs increasing       Vital Signs:   Time Temp Pulse Resp BP MAP (mmHg) Arterial Line BP MAP SpO2 Calculated FIO2 (%) - Nasal Cannula Nasal Cannula O2 Flow Rate (L/min) O2 Device Patient Position - Orthostatic VS   06/23/22 1020 -- 119 Abnormal  -- 132/94 -- -- -- -- -- -- -- --   06/23/22 1000 -- 120 Abnormal  -- 132/94 -- -- -- -- -- -- -- --   06/23/22 0900 -- 135 Abnormal  -- 135/92 -- -- -- -- -- -- -- --   06/23/22 0800 98 2 °F (36 8 °C) 126 Abnormal  23 Abnormal  128/93 106 -- -- 92 % -- -- -- --   06/23/22 0714 98 °F (36 7 °C) 122 Abnormal  24 Abnormal  145/106 Abnormal  114 -- -- 93 % -- -- -- --   06/23/22 0600 -- 123 Abnormal  27 Abnormal  134/86 104 -- -- 91 % -- -- -- --   06/23/22 0500 -- 132 Abnormal  40 Abnormal  155/109 Abnormal  123 -- -- 91 % -- -- -- --   06/23/22 0400 -- 125 Abnormal  34 Abnormal  140/70 92 -- -- 94 % -- -- -- --   06/23/22 0329 98 °F (36 7 °C) 131 Abnormal  30 Abnormal  133/76 98 -- -- 93 % -- -- None (Room air) Lying   06/23/22 0316 -- 133 Abnormal  -- -- -- -- -- -- -- -- -- --   06/23/22 0300 -- 136 Abnormal  -- 147/62 -- -- -- -- -- -- -- --   06/23/22 0227 -- 131 Abnormal  -- 129/86 -- -- -- -- -- -- -- --   06/23/22 0200 -- 136 Abnormal  34 Abnormal  108/83 93 99/89 93 mmHg 92 % -- -- -- --   06/23/22 0000 97 9 °F (36 6 °C) 134 Abnormal  30 Abnormal  123/83 95 100/80 87 mmHg 91 % -- -- None (Room air) Lying 06/22/22 2300 -- 137 Abnormal  36 Abnormal  121/86 97 93/74 81 mmHg 91 % -- --                06/21 0701 06/22 0700 06/22 0701 06/23 0700     P  O    300     I V  (mL/kg) 165 3 (1 3) 783 2 (6 2)     IV Piggyback 660 80     Total Intake(mL/kg) 825 3 (6 5) 1163 2 (9 2)     Urine (mL/kg/hr) 725 2600 (0 9)     Total Output 725 2600     Net +100 3 -1436  8               Pertinent Labs/Diagnostic Results:       Results from last 7 days   Lab Units 06/23/22  0526 06/22/22  0540 06/21/22  2101   WBC Thousand/uL 14 21* 10 70* 10 24*   HEMOGLOBIN g/dL 11 9* 13 0 14 0   HEMATOCRIT % 35 3* 40 6 42 2   PLATELETS Thousands/uL 193 229 236   NEUTROS ABS Thousands/µL 11 45*  --  6 30         Results from last 7 days   Lab Units 06/23/22  0526 06/22/22  1811 06/22/22  1328 06/22/22  0540 06/21/22  2101   SODIUM mmol/L 139 140 140 140 142   POTASSIUM mmol/L 4 0 3 8 4 0 4 7 3 9   CHLORIDE mmol/L 104 105 106 105 106   CO2 mmol/L 21 18* 18* 15* 24   ANION GAP mmol/L 14* 17* 16* 20* 12   BUN mg/dL 21 21 21 21 17   CREATININE mg/dL 1 58* 1 52* 1 51* 1 87* 1 56*   EGFR ml/min/1 73sq m 49 52 52 40 50   CALCIUM mg/dL 8 5 8 4 8 7 8 6 9 2   CALCIUM, IONIZED mmol/L 1 13  --   --   --   --    MAGNESIUM mg/dL 2 5  --   --  2 8* 2 4   PHOSPHORUS mg/dL 4 2  --   --  5 1*  --      Results from last 7 days   Lab Units 06/23/22  0526 06/22/22  0540 06/21/22  2101   AST U/L 51* 36 20   ALT U/L 133* 72 64   ALK PHOS U/L 69 70 75   TOTAL PROTEIN g/dL 7 5 7 7 7 9   ALBUMIN g/dL 3 5 3 6 3 5   TOTAL BILIRUBIN mg/dL 0 47 0 48 0 59   BILIRUBIN DIRECT mg/dL  --  0 19  --      Results from last 7 days   Lab Units 06/23/22  1129 06/23/22  0738 06/22/22  2048 06/22/22  1638 06/22/22  1152 06/22/22  0806 06/22/22  0022   POC GLUCOSE mg/dl 133 167* 140 128 150* 223* 150*     Results from last 7 days   Lab Units 06/23/22  0526 06/22/22  1811 06/22/22  1328 06/22/22  0540 06/21/22  2101   GLUCOSE RANDOM mg/dL 129 141* 136 286* 97         Results from last 7 days Lab Units 06/22/22  0601   HEMOGLOBIN A1C % 5 9*   EAG mg/dl 123     No results found for: BETA-HYDROXYBUTYRATE   Results from last 7 days   Lab Units 06/22/22  1331 06/22/22  0602   PH ART  7 400 7 363   PCO2 ART mm Hg 27 1* 22 7*   PO2 ART mm Hg 82 8 77 3   HCO3 ART mmol/L 16 4* 12 6*   BASE EXC ART mmol/L -6 9 -10 8   O2 CONTENT ART mL/dL 17 2 17 2   O2 HGB, ARTERIAL % 95 0 93 8*   ABG SOURCE  Line, Arterial Line, Arterial     Results from last 7 days   Lab Units 06/21/22  2325   PH DANYEL  7 371   PCO2 DANYEL mm Hg 39 9*   PO2 DANYEL mm Hg 31 3*   HCO3 DANYEL mmol/L 22 6*   BASE EXC DANYEL mmol/L -2 4   O2 CONTENT DANYEL ml/dL 11 1   O2 HGB, VENOUS % 54 2*             Results from last 7 days   Lab Units 06/22/22  1103 06/22/22  0807 06/22/22  0543 06/22/22  0138 06/21/22  2325 06/21/22 2101   HS TNI 0HR ng/L  --   --  23  --   --  23   HS TNI 2HR ng/L  --  32  --   --  27  --    HSTNI D2 ng/L  --  9  --   --  4  --    HS TNI 4HR ng/L 25  --   --  23  --   --    HSTNI D4 ng/L 2  --   --  0  --   --      Results from last 7 days   Lab Units 06/21/22  2101   D-DIMER QUANTITATIVE ug/ml FEU 1 56*     Results from last 7 days   Lab Units 06/23/22  1258 06/23/22  0526 06/22/22  1811 06/22/22  0138 06/21/22  2101   PROTIME seconds  --   --   --   --  13 5   INR   --   --   --   --  1 07   PTT seconds 66* 39* 41*   < >  --     < > = values in this interval not displayed       Results from last 7 days   Lab Units 06/21/22  2101   TSH 3RD GENERATON uIU/mL 1 047     Results from last 7 days   Lab Units 06/23/22  0526 06/22/22  0221   PROCALCITONIN ng/ml 0 28* 0 07     Results from last 7 days   Lab Units 06/22/22  1103 06/22/22  0807 06/22/22  0540 06/21/22  2101   LACTIC ACID mmol/L 0 7 3 1* 4 8* 1 8       Results from last 7 days   Lab Units 06/21/22  2101   NT-PRO BNP pg/mL 2,533*       Results from last 7 days   Lab Units 06/22/22  0540   LIPASE u/L 35*       Results from last 7 days   Lab Units 06/22/22  0200   CLARITY UA  Clear COLOR UA  [place]   SPEC GRAV UA  >=1 030   PH UA  6 0   GLUCOSE UA mg/dl Negative   KETONES UA mg/dl Negative   BLOOD UA  Negative   PROTEIN UA mg/dl Negative   NITRITE UA  Negative   BILIRUBIN UA  Negative   UROBILINOGEN UA E U /dl 0 2   LEUKOCYTES UA  Negative     Results from last 7 days   Lab Units 06/22/22  0154   STREP PNEUMONIAE ANTIGEN, URINE  Negative   LEGIONELLA URINARY ANTIGEN  Negative         Results from last 7 days   Lab Units 06/22/22  0220   BLOOD CULTURE  No Growth at 24 hrs  No Growth at 24 hrs  Medications:   Scheduled Medications:  amiodarone, 400 mg, Oral, BID With Meals  atorvastatin, 10 mg, Oral, Daily With Dinner  cefTRIAXone, 2,000 mg, Intravenous, U17B  folic acid, 1 mg, Oral, Daily  insulin lispro, 1-5 Units, Subcutaneous, TID AC  insulin lispro, 1-5 Units, Subcutaneous, HS  multivitamin-minerals, 1 tablet, Oral, Daily  thiamine, 100 mg, Oral, Daily      Continuous IV Infusions:  amiodarone, 0 5 mg/min, Intravenous, Continuous  heparin (porcine), 3-20 Units/kg/hr (Order-Specific), Intravenous, Titrated      PRN Meds:  acetaminophen, 975 mg, Oral, Q8H PRN  heparin (porcine), 2,000 Units, Intravenous, Q1H PRN  heparin (porcine), 4,000 Units, Intravenous, Q1H PRN  HYDROmorphone, 0 2 mg, Intravenous, Q4H PRN  metoprolol, 5 mg, Intravenous, Q6H PRN  oxyCODONE, 2 5 mg, Oral, Q4H PRN  oxyCODONE, 5 mg, Oral, Q4H PRN        Discharge Plan: D    Network Utilization Review Department  ATTENTION: Please call with any questions or concerns to [phone] and carefully listen to the prompts so that you are directed to the right person  All voicemails are confidential   [facility] all requests for admission clinical reviews, approved or denied determinations and any other requests to dedicated fax number below belonging to the campus where the patient is receiving treatment   List of dedicated fax numbers for the Facilities:  FACILITY NAME UR FAX NUMBER   ADMISSION DENIALS (Administrative/Medical Necessity) [phone]   [address] (Maternity/NICU/Pediatrics) 261 [facility] [facility] 40 125 [facility]  [phone]   Camilla Garcia 50 [address] [facility] [place] [address] [id number] [place] [address] [address] Sarah Juarez [address] [facility] [address] [phone]

## 2022-06-23 NOTE — PROGRESS NOTES
[address] [facility]  Progress Note - Shannon Thomason 1970, 46 y o  male MRN: [id number]  Unit/Bed#:  Encounter: [id number]  Primary Care Provider: David Landaverde MD   Date and time admitted to hospital: 6/21/2022  8:39 PM    PNA (pneumonia)  Assessment & Plan  · CT PE demonstrated RUL infiltrate concerning for PNA, started on CAP tx w/ IV rocephin/azithromycin  · De-escalate to rocpehin alone given legionella negative  · Pro dinorah negative, consider d/c abx     VASILE (acute kidney injury) ([facility] [address] )  Assessment & Plan  · Baseline last documented at 1 1 on renal function from 2020-21  · Cr 1 56 on admission, relatively stable at 1 52 on evening re-check   · Trend renal function and UOP  · Continue diuresis for goal negative 500cc-1L    CHF (congestive heart failure) ([facility] [address] )  Assessment & Plan  Wt Readings from Last 3 Encounters:   06/22/22 127 kg (279 lb)   11/16/21 124 kg (274 lb)   09/11/20 127 kg (279 lb)     · No documented hx of CHF, no previous echos on file  · NT BNP 2533, no previous records on file  · Daily weights   · Clinical hx of CHF given sx of AL, orthopnea, leg swelling, worsening SOB  · TTE 6/22- LVEF 35% with moderate global hypokinesis w/ hypokinesis of anterior wall with moderate to severe MR  · Given 60mg lasix in AM with good response, -1 6 L for day with 2L out in response  · goal negative -1L      Hyperlipidemia  Assessment & Plan  · Continue lipitor 10mg daily, check lipid panel in AM    Alcohol abuse  Assessment & Plan  · Hx of alcohol abuse, per pt he significantly cut back on drinking and now goes months without drinking and occasionally has 2-3 mixed drinks (2 shots of vodka in cocktail) 2-3 times a week if he is drinking  · Last drink was on last Wednesday per patient  · Manish Albarran started given 2 doses of Ativan yesterday per protocol       Hypertension  Assessment & Plan  · No medications at home however patient states in the past he was told he had high BP and did not want to start BP medications at this time  · Brief severe hypotension requiring levophed, now off, holding anti-HTNs in setting of recent hypotension      * Atrial fibrillation (HCC)  Assessment & Plan  · Presented in AF RVR with rates of 170, given cardizem bolus and started on gtt in ED with minimal repsonse  · Stopped Cardizem gtt and discontinued IV lopressor  · Given amio bolus x1 with significant hypotension requiring levophed  · Started on Amio gtt in AM, tolerated well without further hemodynamic compromise  · amio gtt continued at 0 5/hr  · Rates continued to rise, planned for Dig load with 500mcg x1 and 250mcg q6 x2  · Given IV lopressor 2 5 mg and 5mg for rates >150 o/n  · Cardiology consulted, appreciate recs  · If continued in AF w/ difficulty controlling rates consider MIKO/cardioversion      ----------------------------------------------------------------------------------------  HPI/24hr events: Amio gtt continued o/n at 0 5  Given IV lopressor 2 5mg x1 and 5mg x1 for AF RVR w/ rates >150  Continued Dig load  No further episodes of hypotension  C/o of persistent 6/10 substernal non-radiating CP that feels like pressure o/n, EKG improved showing resolution of lateral TWI noted previously  Started on pain regimen  Disposition: Transfer to Med Surg with Telemetry   Code Status: Level 1 - Full Code  ---------------------------------------------------------------------------------------  SUBJECTIVE    Review of Systems   Constitutional: Positive for fatigue  Negative for chills and fever  Respiratory: Positive for shortness of breath  Cardiovascular: Positive for chest pain  Gastrointestinal: Negative for abdominal distention and abdominal pain  Musculoskeletal: Negative for arthralgias  Neurological: Negative for numbness and headaches  Psychiatric/Behavioral: Negative for agitation       Review of systems was reviewed and negative unless stated above in HPI/24-hour events ---------------------------------------------------------------------------------------  OBJECTIVE    Vitals   Vitals:    22 2300 22 0000 22 0200 22 0227   BP: 121/86 123/83 108/83 129/86   BP Location:  Left arm     Pulse: (!) 137 (!) 134 (!) 136 (!) 131   Resp: (!) 36 (!) 30 (!) 34    Temp:  97 9 °F (36 6 °C)     TempSrc:  Oral     SpO2: 91% 91% 92%    Weight:       Height:         Temp (24hrs), Av 8 °F (36 6 °C), Min:97 6 °F (36 4 °C), Max:97 9 °F (36 6 °C)  Current: Temperature: 97 9 °F (36 6 °C)  Arterial Line BP: 99/89  Arterial Line MAP (mmHg): 93 mmHg    Respiratory:    Nasal Cannula O2 Flow Rate (L/min): 4 L/min    Invasive/non-invasive ventilation settings   Respiratory  Report   Lab Data (Last 4 hours)    None         O2/Vent Data (Last 4 hours)    None                Physical Exam  Constitutional:       General: He is awake  He is not in acute distress  Appearance: He is not ill-appearing  Interventions: Nasal cannula in place  HENT:      Head: Normocephalic and atraumatic  Cardiovascular:      Rate and Rhythm: Normal rate  Rhythm irregularly irregular  Heart sounds: Murmur heard  Pulmonary:      Breath sounds: Normal breath sounds  Abdominal:      General: There is distension  Palpations: Abdomen is soft  Tenderness: There is no abdominal tenderness  Skin:     General: Skin is warm and dry  Capillary Refill: Capillary refill takes less than 2 seconds  Neurological:      General: No focal deficit present  Mental Status: He is alert and oriented to person, place, and time  GCS: GCS eye subscore is 4  GCS verbal subscore is 5  GCS motor subscore is 6  Psychiatric:         Behavior: Behavior is cooperative               Laboratory and Diagnostics:  Results from last 7 days   Lab Units 22  0540 06/21/22  2101   WBC Thousand/uL 10 70* 10 24*   HEMOGLOBIN g/dL 13 0 14 0   HEMATOCRIT % 40 6 42 2   PLATELETS Thousands/uL 229 236   NEUTROS PCT %  --  61   MONOS PCT %  --  10     Results from last 7 days   Lab Units 06/22/22  1811 06/22/22  1328 06/22/22  0540 06/21/22  2101   SODIUM mmol/L 140 140 140 142   POTASSIUM mmol/L 3 8 4 0 4 7 3 9   CHLORIDE mmol/L 105 106 105 106   CO2 mmol/L 18* 18* 15* 24   ANION GAP mmol/L 17* 16* 20* 12   BUN mg/dL 21 21 21 17   CREATININE mg/dL 1 52* 1 51* 1 87* 1 56*   CALCIUM mg/dL 8 4 8 7 8 6 9 2   GLUCOSE RANDOM mg/dL 141* 136 286* 97   ALT U/L  --   --  72 64   AST U/L  --   --  36 20   ALK PHOS U/L  --   --  70 75   ALBUMIN g/dL  --   --  3 6 3 5   TOTAL BILIRUBIN mg/dL  --   --  0 48 0 59     Results from last 7 days   Lab Units 06/22/22  0540 06/21/22  2101   MAGNESIUM mg/dL 2 8* 2 4   PHOSPHORUS mg/dL 5 1*  --       Results from last 7 days   Lab Units 06/22/22  1811 06/22/22  0920 06/22/22  0138 06/21/22  2101   INR   --   --   --  1 07   PTT seconds 41* 25 30  --           Results from last 7 days   Lab Units 06/22/22  1103 06/22/22  0807 06/22/22  0540 06/21/22  2101   LACTIC ACID mmol/L 0 7 3 1* 4 8* 1 8     ABG:  Results from last 7 days   Lab Units 06/22/22  1331   PH ART  7 400   PCO2 ART mm Hg 27 1*   PO2 ART mm Hg 82 8   HCO3 ART mmol/L 16 4*   BASE EXC ART mmol/L -6 9   ABG SOURCE  Line, Arterial     VBG:  Results from last 7 days   Lab Units 06/22/22  1331 06/22/22  0602 06/21/22  2325   PH DANYEL   --   --  7 371   PCO2 DANYEL mm Hg  --   --  39 9*   PO2 DANYEL mm Hg  --   --  31 3*   HCO3 DANYEL mmol/L  --   --  22 6*   BASE EXC DANYEL mmol/L  --   --  -2 4   ABG SOURCE  Line, Arterial   < >  --     < > = values in this interval not displayed  Results from last 7 days   Lab Units 06/22/22  0221   PROCALCITONIN ng/ml 0 07       Micro  Results from last 7 days   Lab Units 06/22/22  0220 06/22/22  0154   BLOOD CULTURE  Received in Microbiology Lab  Culture in Progress  Received in Microbiology Lab  Culture in Progress    --    LEGIONELLA URINARY ANTIGEN   --  Negative   STREP PNEUMONIAE ANTIGEN, URINE   --  Negative       EKG: AF, rate 135, normal axis, interval resolution of lateral TWI, no RODNEY/D  Imaging: I have personally reviewed pertinent reports  Intake and Output  I/O       06/21 0701 06/22 0700 06/22 0701 06/23 0700    P  O   300    I V  (mL/kg) 165 3 (1 3) 783 2 (6 2)    IV Piggyback 660 80    Total Intake(mL/kg) 825 3 (6 5) 1163 2 (9 2)    Urine (mL/kg/hr) 725 2600 (0 9)    Total Output 725 2600    Net +100 3 -1436 8                Height and Weights   Height: 6' 2" (188 cm)  IBW (Ideal Body Weight): 82 2 kg  Body mass index is 35 82 kg/m²  Weight (last 2 days)     Date/Time Weight    06/22/22 0700 127 (279)    06/22/22 0133 127 (279 32)            Nutrition       Diet Orders   (From admission, onward)             Start     Ordered    06/22/22 1217  Diet Cardiovascular; Cardiac; Fluid Restriction 2000 ML  Diet effective now        References:    Nutrtion Support Algorithm Enteral vs  Parenteral   Question Answer Comment   Diet Type Cardiovascular    Cardiac Cardiac    Other Restriction(s): Fluid Restriction 2000 ML    RD to adjust diet per protocol?  No        06/22/22 1216                  Active Medications  Scheduled Meds:  Current Facility-Administered Medications   Medication Dose Route Frequency Provider Last Rate    acetaminophen  975 mg Oral Q8H PRN Angulo May      amiodarone  0 5 mg/min Intravenous Continuous Briseyda Chen PA-C 0 5 mg/min (06/22/22 1802)    atorvastatin  10 mg Oral Daily With Dinner Angulo May      cefTRIAXone  2,000 mg Intravenous Q24H Goldy G Marshall 2,000 mg (06/23/22 0234)    digoxin  250 mcg Intravenous Q6H Deborah Silk, CRNP      folic acid  1 mg Oral Daily Deborah Silk, CRNP      heparin (porcine)  3-20 Units/kg/hr (Order-Specific) Intravenous Titrated Sophie Kerr DO 19 1 Units/kg/hr (06/22/22 2104)    heparin (porcine)  2,000 Units Intravenous Q1H PRN Sophie Kerr DO      heparin (porcine)  4,000 Units Intravenous Q1H PRN Cindy Bailey DO      HYDROmorphone  0 2 mg Intravenous Q4H PRN Allean Grim      insulin lispro  1-5 Units Subcutaneous TID AC JEROD Rangel      insulin lispro  1-5 Units Subcutaneous HS JEROD Rangel      multivitamin-minerals  1 tablet Oral Daily [facility]      norepinephrine  1-30 mcg/min Intravenous Titrated JEROD Manuel Stopped (06/22/22 1830)    oxyCODONE  2 5 mg Oral Q4H PRN Allean Grim      oxyCODONE  5 mg Oral Q4H PRN Allean Grim      thiamine  100 mg Oral Daily JEROD Rangel       Continuous Infusions:  amiodarone, 0 5 mg/min, Last Rate: 0 5 mg/min (06/22/22 1802)  heparin (porcine), 3-20 Units/kg/hr (Order-Specific), Last Rate: 19 1 Units/kg/hr (06/22/22 2104)  norepinephrine, 1-30 mcg/min, Last Rate: Stopped (06/22/22 1830)      PRN Meds:   acetaminophen, 975 mg, Q8H PRN  heparin (porcine), 2,000 Units, Q1H PRN  heparin (porcine), 4,000 Units, Q1H PRN  HYDROmorphone, 0 2 mg, Q4H PRN  oxyCODONE, 2 5 mg, Q4H PRN  oxyCODONE, 5 mg, Q4H PRN        Invasive Devices Review  Invasive Devices  Report    Peripheral Intravenous Line  Duration           Peripheral IV 06/21/22 Left Hand 1 day    Peripheral IV 06/22/22 Left Antecubital <1 day    Peripheral IV 06/23/22 Proximal;Right;Ventral (anterior) Forearm <1 day          Arterial Line  Duration           Arterial Line 06/22/22 Radial <1 day                Rationale for remaining devices:   ---------------------------------------------------------------------------------------  Advance Directive and Living Will:      Power of :    POLST:    ---------------------------------------------------------------------------------------  Care Time Delivered:   No Critical Care time spent       [facility] PA-      Portions of the record may have been created with voice recognition software    Occasional wrong word or "sound a like" substitutions may have occurred due to the inherent limitations of voice recognition software    Read the chart carefully and recognize, using context, where substitutions have occurred

## 2022-06-23 NOTE — PLAN OF CARE
Problem: MOBILITY - ADULT  Goal: Maintain or return to baseline ADL function  Description: INTERVENTIONS:  -  Assess patient's ability to carry out ADLs; assess patient's baseline for ADL function and identify physical deficits which impact ability to perform ADLs (bathing, care of mouth/teeth, toileting, grooming, dressing, etc )  - Assess/evaluate cause of self-care deficits   - Assess range of motion  - Assess patient's mobility; develop plan if impaired  - Assess patient's need for assistive devices and provide as appropriate  - Encourage maximum independence but intervene and supervise when necessary  - Involve family in performance of ADLs  - Assess for home care needs following discharge   - Consider OT consult to assist with ADL evaluation and planning for discharge  - Provide patient education as appropriate  Outcome: Progressing  Goal: Maintains/Returns to pre admission functional level  Description: INTERVENTIONS:  - Perform BMAT or MOVE assessment daily    - Set and communicate daily mobility goal to care team and patient/family/caregiver     Outcome: Progressing     Problem: Potential for Falls  Goal: Patient will remain free of falls  Description: INTERVENTIONS:  - Educate patient/family on patient safety including physical limitations  - Instruct patient to call for assistance with activity   - Consult OT/PT to assist with strengthening/mobility   - Keep Call bell within reach  - Keep bed low and locked with side rails adjusted as appropriate  - Keep care items and personal belongings within reach  - Initiate and maintain comfort rounds  - Make Fall Risk Sign visible to staff  - Apply yellow socks and bracelet for high fall risk patients  - Consider moving patient to room near nurses station  Outcome: Progressing     Problem: Prexisting or High Potential for Compromised Skin Integrity  Goal: Skin integrity is maintained or improved  Description: INTERVENTIONS:  - Identify patients at risk for skin breakdown  - Assess and monitor skin integrity  - Assess and monitor nutrition and hydration status  - Monitor labs   - Assess for incontinence   - Turn and reposition patient  - Assist with mobility/ambulation  - Relieve pressure over bony prominences  - Avoid friction and shearing  - Provide appropriate hygiene as needed including keeping skin clean and dry  - Evaluate need for skin moisturizer/barrier cream  - Collaborate with interdisciplinary team   - Patient/family teaching  - Consider wound care consult   Outcome: Progressing     Problem: PAIN - ADULT  Goal: Verbalizes/displays adequate comfort level or baseline comfort level  Description: Interventions:  - Encourage patient to monitor pain and request assistance  - Assess pain using appropriate pain scale  - Administer analgesics based on type and severity of pain and evaluate response  - Implement non-pharmacological measures as appropriate and evaluate response  - Consider cultural and social influences on pain and pain management  - Notify physician/advanced practitioner if interventions unsuccessful or patient reports new pain  Outcome: Progressing     Problem: INFECTION - ADULT  Goal: Absence or prevention of progression during hospitalization  Description: INTERVENTIONS:  - Assess and monitor for signs and symptoms of infection  - Monitor lab/diagnostic results  - Monitor all insertion sites, i e  indwelling lines, tubes, and drains  - Monitor endotracheal if appropriate and nasal secretions for changes in amount and color  - [place] appropriate cooling/warming therapies per order  - Administer medications as ordered  - Instruct and encourage patient and family to use good hand hygiene technique  - Identify and instruct in appropriate isolation precautions for identified infection/condition  Outcome: Progressing  Goal: Absence of fever/infection during neutropenic period  Description: INTERVENTIONS:  - Monitor WBC    Outcome: Progressing     Problem: DISCHARGE PLANNING  Goal: Discharge to home or other facility with appropriate resources  Description: INTERVENTIONS:  - Identify barriers to discharge w/patient and caregiver  - Arrange for needed discharge resources and transportation as appropriate  - Identify discharge learning needs (meds, wound care, etc )  - Arrange for interpretive services to assist at discharge as needed  - Refer to Case Management Department for coordinating discharge planning if the patient needs post-hospital services based on physician/advanced practitioner order or complex needs related to functional status, cognitive ability, or social support system  Outcome: Progressing     Problem: Knowledge Deficit  Goal: Patient/family/caregiver demonstrates understanding of disease process, treatment plan, medications, and discharge instructions  Description: Complete learning assessment and assess knowledge base    Interventions:  - Provide teaching at level of understanding  - Provide teaching via preferred learning methods  Outcome: Progressing     Problem: CARDIOVASCULAR - ADULT  Goal: Maintains optimal cardiac output and hemodynamic stability  Description: INTERVENTIONS:  - Monitor I/O, vital signs and rhythm  - Monitor for S/S and trends of decreased cardiac output  - Administer and titrate ordered vasoactive medications to optimize hemodynamic stability  - Assess quality of pulses, skin color and temperature  - Assess for signs of decreased coronary artery perfusion  - Instruct patient to report change in severity of symptoms  Outcome: Progressing  Goal: Absence of cardiac dysrhythmias or at baseline rhythm  Description: INTERVENTIONS:  - Continuous cardiac monitoring, vital signs, obtain 12 lead EKG if ordered  - Administer antiarrhythmic and heart rate control medications as ordered  - Monitor electrolytes and administer replacement therapy as ordered  Outcome: Progressing

## 2022-06-23 NOTE — ASSESSMENT & PLAN NOTE
· CT PE demonstrated RUL infiltrate concerning for PNA, started on CAP tx w/ IV rocephin/azithromycin  · De-escalated to rocpehin alone given legionella negative  · Ceftriaxone day 3 (started 6/21)

## 2022-06-23 NOTE — ASSESSMENT & PLAN NOTE
Wt Readings from Last 3 Encounters:   06/22/22 127 kg (279 lb)   11/16/21 124 kg (274 lb)   09/11/20 127 kg (279 lb)     · No documented hx of CHF, no previous echos on file  · NT BNP 2533, no previous records on file  · Daily weights   · Clinical hx of CHF given sx of AL, orthopnea, leg swelling, worsening SOB  · TTE 6/22- LVEF 35% with moderate global hypokinesis w/ hypokinesis of anterior wall with moderate to severe MR  · Continue diuresis for net negative 500-1L

## 2022-06-23 NOTE — ANESTHESIA PREPROCEDURE EVALUATION
Procedure:  MIKO    Relevant Problems   CARDIO   (+) Atrial fibrillation (HCC)   (+) CHF (congestive heart failure) (HCC)   (+) Hyperlipidemia   (+) Hypertension      /RENAL   (+) VASILE (acute kidney injury) ([facility] [address] )      NEURO/PSYCH   (+) Depression with anxiety      PULMONARY   (+) PNA (pneumonia)   (+) Smoker        Physical Exam    Airway    Mallampati score: II  TM Distance: >3 FB  Neck ROM: full     Dental       Cardiovascular      Pulmonary      Other Findings        Anesthesia Plan  ASA Score- 4 Emergent    Anesthesia Type- IV sedation with anesthesia with ASA Monitors  Additional Monitors:   Airway Plan:     Comment: I have seen the patient and reviewed the history  Patient to receive IV sedation with full ASA monitors  Risks discussed with the patient, consent signed          Plan Factors-Exercise tolerance (METS): >4 METS  Chart reviewed  EKG reviewed  Patient summary reviewed  Induction- intravenous  Postoperative Plan-     Informed Consent- Anesthetic plan and risks discussed with patient  I personally reviewed this patient with the CRNA  Discussed and agreed on the Anesthesia Plan with the CRNA  Licha Ash

## 2022-06-23 NOTE — ASSESSMENT & PLAN NOTE
· Presented in AF RVR with rates of 170, given cardizem bolus and started on gtt in ED with minimal repsonse  · Stopped Cardizem gtt and discontinued IV lopressor  · Given amio bolus x1 with significant hypotension requiring levophed, now off  · Started on Amio gtt in AM, tolerated well without further hemodynamic compromise  · amio gtt continued at 0 5/hr  · Received Dig load with 500mcg x1 and 250mcg q6 x2 overnight 6/22-6/23   · Continue Coreg 3 125mg BID  · Cardiology consulted, appreciate recs  · Attempted cardioversion 6/23 without success

## 2022-06-24 LAB
ALBUMIN SERPL BCP-MCNC: 3.2 G/DL (ref 3.5–5)
ALP SERPL-CCNC: 65 U/L (ref 46–116)
ALT SERPL W P-5'-P-CCNC: 105 U/L (ref 12–78)
ANION GAP SERPL CALCULATED.3IONS-SCNC: 10 MMOL/L (ref 4–13)
APTT PPP: 45 SECONDS (ref 23–37)
APTT PPP: 56 SECONDS (ref 23–37)
APTT PPP: 70 SECONDS (ref 23–37)
AST SERPL W P-5'-P-CCNC: 40 U/L (ref 5–45)
ATRIAL RATE: 136 BPM
BASOPHILS # BLD AUTO: 0.05 THOUSANDS/ΜL (ref 0–0.1)
BASOPHILS NFR BLD AUTO: 1 % (ref 0–1)
BILIRUB SERPL-MCNC: 0.37 MG/DL (ref 0.2–1)
BUN SERPL-MCNC: 20 MG/DL (ref 5–25)
CA-I BLD-SCNC: 1.14 MMOL/L (ref 1.12–1.32)
CALCIUM ALBUM COR SERPL-MCNC: 9.3 MG/DL (ref 8.3–10.1)
CALCIUM SERPL-MCNC: 8.7 MG/DL (ref 8.3–10.1)
CHLORIDE SERPL-SCNC: 103 MMOL/L (ref 100–108)
CO2 SERPL-SCNC: 28 MMOL/L (ref 21–32)
CREAT SERPL-MCNC: 1.55 MG/DL (ref 0.6–1.3)
EOSINOPHIL # BLD AUTO: 0.07 THOUSAND/ΜL (ref 0–0.61)
EOSINOPHIL NFR BLD AUTO: 1 % (ref 0–6)
ERYTHROCYTE [DISTWIDTH] IN BLOOD BY AUTOMATED COUNT: 13.6 % (ref 11.6–15.1)
GFR SERPL CREATININE-BSD FRML MDRD: 51 ML/MIN/1.73SQ M
GLUCOSE SERPL-MCNC: 107 MG/DL (ref 65–140)
GLUCOSE SERPL-MCNC: 121 MG/DL (ref 65–140)
GLUCOSE SERPL-MCNC: 131 MG/DL (ref 65–140)
GLUCOSE SERPL-MCNC: 132 MG/DL (ref 65–140)
GLUCOSE SERPL-MCNC: 146 MG/DL (ref 65–140)
HCT VFR BLD AUTO: 35.2 % (ref 36.5–49.3)
HGB BLD-MCNC: 11.4 G/DL (ref 12–17)
IMM GRANULOCYTES # BLD AUTO: 0.06 THOUSAND/UL (ref 0–0.2)
IMM GRANULOCYTES NFR BLD AUTO: 1 % (ref 0–2)
LYMPHOCYTES # BLD AUTO: 1.88 THOUSANDS/ΜL (ref 0.6–4.47)
LYMPHOCYTES NFR BLD AUTO: 19 % (ref 14–44)
MAGNESIUM SERPL-MCNC: 2.3 MG/DL (ref 1.6–2.6)
MCH RBC QN AUTO: 30.4 PG (ref 26.8–34.3)
MCHC RBC AUTO-ENTMCNC: 32.4 G/DL (ref 31.4–37.4)
MCV RBC AUTO: 94 FL (ref 82–98)
MONOCYTES # BLD AUTO: 1.01 THOUSAND/ΜL (ref 0.17–1.22)
MONOCYTES NFR BLD AUTO: 10 % (ref 4–12)
NEUTROPHILS # BLD AUTO: 6.62 THOUSANDS/ΜL (ref 1.85–7.62)
NEUTS SEG NFR BLD AUTO: 68 % (ref 43–75)
NRBC BLD AUTO-RTO: 0 /100 WBCS
PHOSPHATE SERPL-MCNC: 3.7 MG/DL (ref 2.7–4.5)
PLATELET # BLD AUTO: 169 THOUSANDS/UL (ref 149–390)
PMV BLD AUTO: 12.8 FL (ref 8.9–12.7)
POTASSIUM SERPL-SCNC: 3.6 MMOL/L (ref 3.5–5.3)
PROCALCITONIN SERPL-MCNC: 0.24 NG/ML
PROT SERPL-MCNC: 7.3 G/DL (ref 6.4–8.2)
QRS AXIS: 61 DEGREES
QRSD INTERVAL: 84 MS
QT INTERVAL: 312 MS
QTC INTERVAL: 446 MS
RBC # BLD AUTO: 3.75 MILLION/UL (ref 3.88–5.62)
SODIUM SERPL-SCNC: 141 MMOL/L (ref 136–145)
T WAVE AXIS: 137 DEGREES
VENTRICULAR RATE: 123 BPM
WBC # BLD AUTO: 9.69 THOUSAND/UL (ref 4.31–10.16)

## 2022-06-24 PROCEDURE — 99233 SBSQ HOSP IP/OBS HIGH 50: CPT | Performed by: INTERNAL MEDICINE

## 2022-06-24 PROCEDURE — 85730 THROMBOPLASTIN TIME PARTIAL: CPT | Performed by: PHYSICIAN ASSISTANT

## 2022-06-24 PROCEDURE — 84100 ASSAY OF PHOSPHORUS: CPT

## 2022-06-24 PROCEDURE — 82948 REAGENT STRIP/BLOOD GLUCOSE: CPT

## 2022-06-24 PROCEDURE — 85025 COMPLETE CBC W/AUTO DIFF WBC: CPT

## 2022-06-24 PROCEDURE — 80053 COMPREHEN METABOLIC PANEL: CPT

## 2022-06-24 PROCEDURE — 93010 ELECTROCARDIOGRAM REPORT: CPT | Performed by: INTERNAL MEDICINE

## 2022-06-24 PROCEDURE — 85730 THROMBOPLASTIN TIME PARTIAL: CPT | Performed by: NURSE PRACTITIONER

## 2022-06-24 PROCEDURE — 84145 PROCALCITONIN (PCT): CPT

## 2022-06-24 PROCEDURE — 85730 THROMBOPLASTIN TIME PARTIAL: CPT | Performed by: INTERNAL MEDICINE

## 2022-06-24 PROCEDURE — 83735 ASSAY OF MAGNESIUM: CPT

## 2022-06-24 PROCEDURE — 82330 ASSAY OF CALCIUM: CPT

## 2022-06-24 RX ORDER — DIGOXIN 125 MCG
125 TABLET ORAL DAILY
Status: DISCONTINUED | OUTPATIENT
Start: 2022-06-24 | End: 2022-06-28

## 2022-06-24 RX ORDER — CARVEDILOL 6.25 MG/1
6.25 TABLET ORAL 2 TIMES DAILY WITH MEALS
Status: DISCONTINUED | OUTPATIENT
Start: 2022-06-24 | End: 2022-06-24

## 2022-06-24 RX ORDER — FUROSEMIDE 40 MG/1
40 TABLET ORAL DAILY
Status: DISCONTINUED | OUTPATIENT
Start: 2022-06-25 | End: 2022-06-28 | Stop reason: HOSPADM

## 2022-06-24 RX ORDER — FUROSEMIDE 10 MG/ML
60 INJECTION INTRAMUSCULAR; INTRAVENOUS ONCE
Status: COMPLETED | OUTPATIENT
Start: 2022-06-24 | End: 2022-06-24

## 2022-06-24 RX ADMIN — HEPARIN SODIUM AND DEXTROSE 25.1 UNITS/KG/HR: 10000; 5 INJECTION INTRAVENOUS at 08:04

## 2022-06-24 RX ADMIN — FUROSEMIDE 60 MG: 10 INJECTION, SOLUTION INTRAMUSCULAR; INTRAVENOUS at 02:55

## 2022-06-24 RX ADMIN — THIAMINE HCL TAB 100 MG 100 MG: 100 TAB at 08:03

## 2022-06-24 RX ADMIN — METOPROLOL TARTRATE 25 MG: 25 TABLET, FILM COATED ORAL at 17:13

## 2022-06-24 RX ADMIN — AMIODARONE HYDROCHLORIDE 400 MG: 200 TABLET ORAL at 17:13

## 2022-06-24 RX ADMIN — AMIODARONE HYDROCHLORIDE 400 MG: 200 TABLET ORAL at 08:03

## 2022-06-24 RX ADMIN — HEPARIN SODIUM AND DEXTROSE 25.1 UNITS/KG/HR: 10000; 5 INJECTION INTRAVENOUS at 19:36

## 2022-06-24 RX ADMIN — HEPARIN SODIUM 2000 UNITS: 1000 INJECTION INTRAVENOUS; SUBCUTANEOUS at 21:49

## 2022-06-24 RX ADMIN — HEPARIN SODIUM 2000 UNITS: 1000 INJECTION INTRAVENOUS; SUBCUTANEOUS at 06:42

## 2022-06-24 RX ADMIN — DIGOXIN 125 MCG: 125 TABLET ORAL at 09:59

## 2022-06-24 RX ADMIN — CEFTRIAXONE 2000 MG: 2 INJECTION, POWDER, FOR SOLUTION INTRAMUSCULAR; INTRAVENOUS at 02:11

## 2022-06-24 RX ADMIN — FOLIC ACID 1 MG: 1 TABLET ORAL at 08:03

## 2022-06-24 RX ADMIN — ATORVASTATIN CALCIUM 10 MG: 10 TABLET, FILM COATED ORAL at 17:13

## 2022-06-24 RX ADMIN — Medication 1 TABLET: at 08:03

## 2022-06-24 RX ADMIN — CARVEDILOL 3.12 MG: 3.12 TABLET, FILM COATED ORAL at 08:03

## 2022-06-24 NOTE — PLAN OF CARE
Problem: MOBILITY - ADULT  Goal: Maintain or return to baseline ADL function  Description: INTERVENTIONS:  -  Assess patient's ability to carry out ADLs; assess patient's baseline for ADL function and identify physical deficits which impact ability to perform ADLs (bathing, care of mouth/teeth, toileting, grooming, dressing, etc )  - Assess/evaluate cause of self-care deficits   - Assess range of motion  - Assess patient's mobility; develop plan if impaired  - Assess patient's need for assistive devices and provide as appropriate  - Encourage maximum independence but intervene and supervise when necessary  - Involve family in performance of ADLs  - Assess for home care needs following discharge   - Consider OT consult to assist with ADL evaluation and planning for discharge  - Provide patient education as appropriate  Outcome: Progressing  Goal: Maintains/Returns to pre admission functional level  Description: INTERVENTIONS:  - Perform BMAT or MOVE assessment daily    - Set and communicate daily mobility goal to care team and patient/family/caregiver     - Collaborate with rehabilitation services on mobility goals if consulted  - Out of bed for meals 2 times a day  - Out of bed for toileting  - Record patient progress and toleration of activity level   Outcome: Progressing     Problem: Potential for Falls  Goal: Patient will remain free of falls  Description: INTERVENTIONS:  - Educate patient/family on patient safety including physical limitations  - Instruct patient to call for assistance with activity   - Consult OT/PT to assist with strengthening/mobility   - Keep Call bell within reach  - Keep bed low and locked with side rails adjusted as appropriate  - Keep care items and personal belongings within reach  - Initiate and maintain comfort rounds  - Make Fall Risk Sign visible to staff  - Apply yellow socks and bracelet for high fall risk patients  - Consider moving patient to room near nurses station  Outcome: Progressing     Problem: Prexisting or High Potential for Compromised Skin Integrity  Goal: Skin integrity is maintained or improved  Description: INTERVENTIONS:  - Identify patients at risk for skin breakdown  - Assess and monitor skin integrity  - Assess and monitor nutrition and hydration status  - Monitor labs   - Assess for incontinence   - Turn and reposition patient  - Assist with mobility/ambulation  - Relieve pressure over bony prominences  - Avoid friction and shearing  - Provide appropriate hygiene as needed including keeping skin clean and dry  - Evaluate need for skin moisturizer/barrier cream  - Collaborate with interdisciplinary team   - Patient/family teaching  - Consider wound care consult   Outcome: Progressing     Problem: PAIN - ADULT  Goal: Verbalizes/displays adequate comfort level or baseline comfort level  Description: Interventions:  - Encourage patient to monitor pain and request assistance  - Assess pain using appropriate pain scale  - Administer analgesics based on type and severity of pain and evaluate response  - Implement non-pharmacological measures as appropriate and evaluate response  - Consider cultural and social influences on pain and pain management  - Notify physician/advanced practitioner if interventions unsuccessful or patient reports new pain  Outcome: Progressing     Problem: INFECTION - ADULT  Goal: Absence or prevention of progression during hospitalization  Description: INTERVENTIONS:  - Assess and monitor for signs and symptoms of infection  - Monitor lab/diagnostic results  - Monitor all insertion sites, i e  indwelling lines, tubes, and drains  - Monitor endotracheal if appropriate and nasal secretions for changes in amount and color  - [place] appropriate cooling/warming therapies per order  - Administer medications as ordered  - Instruct and encourage patient and family to use good hand hygiene technique  - Identify and instruct in appropriate isolation precautions for identified infection/condition  Outcome: Progressing  Goal: Absence of fever/infection during neutropenic period  Description: INTERVENTIONS:  - Monitor WBC    Outcome: Progressing     Problem: DISCHARGE PLANNING  Goal: Discharge to home or other facility with appropriate resources  Description: INTERVENTIONS:  - Identify barriers to discharge w/patient and caregiver  - Arrange for needed discharge resources and transportation as appropriate  - Identify discharge learning needs (meds, wound care, etc )  - Arrange for interpretive services to assist at discharge as needed  - Refer to Case Management Department for coordinating discharge planning if the patient needs post-hospital services based on physician/advanced practitioner order or complex needs related to functional status, cognitive ability, or social support system  Outcome: Progressing     Problem: Knowledge Deficit  Goal: Patient/family/caregiver demonstrates understanding of disease process, treatment plan, medications, and discharge instructions  Description: Complete learning assessment and assess knowledge base    Interventions:  - Provide teaching at level of understanding  - Provide teaching via preferred learning methods  Outcome: Progressing     Problem: CARDIOVASCULAR - ADULT  Goal: Maintains optimal cardiac output and hemodynamic stability  Description: INTERVENTIONS:  - Monitor I/O, vital signs and rhythm  - Monitor for S/S and trends of decreased cardiac output  - Administer and titrate ordered vasoactive medications to optimize hemodynamic stability  - Assess quality of pulses, skin color and temperature  - Assess for signs of decreased coronary artery perfusion  - Instruct patient to report change in severity of symptoms  Outcome: Progressing  Goal: Absence of cardiac dysrhythmias or at baseline rhythm  Description: INTERVENTIONS:  - Continuous cardiac monitoring, vital signs, obtain 12 lead EKG if ordered  - Administer antiarrhythmic and heart rate control medications as ordered  - Monitor electrolytes and administer replacement therapy as ordered  Outcome: Progressing

## 2022-06-24 NOTE — PROGRESS NOTES
Cardiology Progress Note   Trudi Gallo 46 y o  male MRN: [id number]    Unit/Bed#: -01 Encounter: [id number]      Assessment:  1  New onset atrial fibrillation with RVR  2  Acute systolic CHF   3  Cardiomyopathy, undifferentiated, EF 35%  4  Pneumonia  5  VASILE  6  Hyperlipidemia    Plan:  Patient stable for transfer out of the ICU   He continues to be irregular; place on telemetry to assess HRs  On amiodarone 400mg BID and digoxin 125mcg  Switch Coreg with Lopressor 25mg BID hold for SBP <95  [id number] is 1 (chf), however, is s/p DCCV x3 (6/23/2022)  On IV heparin for now; will switch to DOAC on discharge after ischemic evaluation is complete  Plan for cardiac cath on Monday to assess, plan to optimize renal functions, RVR and volume status over the weekend  He is overall euvolemic today; add po lasix 40mg QD   Aviod ACEi/ARB/ARNi due to VASILE  Continue atorvastatin 10mg QD     Diagnostics:  HS troponin trend: 23/32/25/73/65  NTproBNP: 2,533    TTE 06/22/2022:    Left Ventricle: Left ventricular cavity size is dilated  Wall thickness is normal  Systolic function is moderately reduced (35%)  There is moderate global hypokinesis with regional variation (hypokinesis of anterior wall)  Diastolic function is normal     Right Ventricle: Right ventricular cavity size is upper normal  Systolic function is normal     Left Atrium: The atrium is mildly dilated    Right Atrium: The atrium is mildly dilated    Aortic Valve: There is trace regurgitation    Mitral Valve: There is moderate to severe regurgitation    Tricuspid Valve: There is mild to moderate regurgitation  The right ventricular systolic pressure is mildly elevated (53 mm Hg)    Pulmonic Valve: There is trace regurgitation    IVC/SVC: The inferior vena cava is dilated  Respirophasic changes were blunted (less than 50% variation)  TTE 06/23/2022:    Left Ventricle: Left ventricular cavity size is dilated   Wall thickness is normal  Systolic function is moderately reduced - 35%  There is moderate global hypokinesis with regional variation  There was spontaneous echo contrast (smoke) seen in the LV    Left Atrium: The atrium is mildly dilated  There is no thrombus  There is mild, continuous spontaneous echo contrast     Right Atrium: The atrium is mildly dilated    Atrial Septum: No patent foramen ovale confirmed at rest by color flow Doppler    Left Atrial Appendage: There is normal function  There is no thrombus    Mitral Valve: There is moderate regurgitation    Tricuspid Valve: There is mild regurgitation  Subjective:   Patient seen and examined  Overnight events reviewed  Objective:   Vitals: Blood pressure 111/67, pulse 56, temperature 97 7 °F (36 5 °C), resp  rate 17, height 6' 2" (1 88 m), weight 123 kg (272 lb 0 8 oz), SpO2 95 %  , Body mass index is 34 93 kg/m² ,   Orthostatic Blood Pressures    Flowsheet Row Most Recent Value   Blood Pressure 111/67 filed at 06/24/2022 1225   Patient Position - Orthostatic VS Lying filed at 06/24/2022 1100          Intake/Output Summary (Last 24 hours) at 6/24/2022 1405  Last data filed at 6/24/2022 0400  Gross per 24 hour   Intake 1601 08 ml   Output 1325 ml   Net 276 08 ml     Physical Exam:  GEN: Linh Rodriguez appears well, alert and oriented x 3, pleasant and cooperative   HEENT: Sclera anicteric, conjunctivae pink, mucous membranes moist  Oropharynx clear  NECK: supple, no significant JVD, Trachea midline, no thyromegaly  HEART: irregular rhythm, no murmurs, clicks, gallops or rubs   LUNGS: tachypneic, decreased BS; no wheezes, rales, or rhonchi  No increased work of breathing or signs of respiratory distress  ABDOMEN: Soft, nontender, nondistended  EXTREMITIES: Skin warm and well perfused, no clubbing, cyanosis, or edema  NEURO: No focal findings  Normal speech  Mood and affect normal    SKIN: Normal without suspicious lesions on exposed skin      Medications:    Current Facility-Administered Medications:     acetaminophen (TYLENOL) tablet 975 mg, 975 mg, Oral, Q8H PRN, JEROD Thomason    amiodarone tablet 400 mg, 400 mg, Oral, BID With Meals, HUMPHREY ThomasonNP, 400 mg at 06/24/22 0803    atorvastatin (LIPITOR) tablet 10 mg, 10 mg, Oral, Daily With Dinner, JEROD Thomason, 10 mg at 06/23/22 1821    carvedilol (COREG) tablet 6 25 mg, 6 25 mg, Oral, BID With Meals, JEROD Thomason    cefTRIAXone (ROCEPHIN) 2,000 mg in dextrose 5 % 50 mL IVPB, 2,000 mg, Intravenous, Q24H, JEROD Thomason, Last Rate: 100 mL/hr at 06/24/22 0211, 2,000 mg at 06/24/22 0211    digoxin (LANOXIN) tablet 125 mcg, 125 mcg, Oral, Daily, JEROD Thomason, 125 mcg at 76/99/34 0497    folic acid (FOLVITE) tablet 1 mg, 1 mg, Oral, Daily, HUMPHREY ThomasonNP, 1 mg at 06/24/22 0803    heparin (porcine) 25,000 units in D5W 250 mL infusion (premix), 3-20 Units/kg/hr (Order-Specific), Intravenous, Titrated, JEROD Thomason, Last Rate: 22 6 mL/hr at 06/24/22 0804, 25 1 Units/kg/hr at 06/24/22 0804    heparin (porcine) injection 2,000 Units, 2,000 Units, Intravenous, Q1H PRN, Jigna Oscar CRNP, 2,000 Units at 06/24/22 7854    heparin (porcine) injection 4,000 Units, 4,000 Units, Intravenous, Q1H PRN, Jigna Oscar CRNP, 4,000 Units at 06/23/22 0857    insulin lispro (HumaLOG) 100 units/mL subcutaneous injection 1-5 Units, 1-5 Units, Subcutaneous, TID AC, 1 Units at 06/23/22 0857 **AND** Fingerstick Glucose (POCT), , , TID AC, JEROD Thomason    insulin lispro (HumaLOG) 100 units/mL subcutaneous injection 1-5 Units, 1-5 Units, Subcutaneous, HS, JEROD Thomason    metoprolol (LOPRESSOR) injection 5 mg, 5 mg, Intravenous, Q6H PRN, JEROD Thomason, 5 mg at 06/23/22 0857    multivitamin-minerals (CENTRUM) tablet 1 tablet, 1 tablet, Oral, Daily, JEROD Thomason, 1 tablet at 06/24/22 0803    thiamine tablet 100 mg, 100 mg, Oral, Daily, JEROD Thomason, 100 mg at 06/24/22 0803     Labs & Results:        Results from last 7 days   Lab Units 06/24/22  0446 06/23/22  0526 06/22/22  0540   WBC Thousand/uL 9 69 14 21* 10 70*   HEMOGLOBIN g/dL 11 4* 11 9* 13 0   HEMATOCRIT % 35 2* 35 3* 40 6   PLATELETS Thousands/uL 169 193 229     Results from last 7 days   Lab Units 06/23/22  0526   TRIGLYCERIDES mg/dL 77   HDL mg/dL 34*     Results from last 7 days   Lab Units 06/24/22  0446 06/23/22  1835 06/23/22  0526 06/22/22  1328 06/22/22  0540   POTASSIUM mmol/L 3 6 3 5 4 0   < > 4 7   CHLORIDE mmol/L 103 102 104   < > 105   CO2 mmol/L 28 25 21   < > 15*   BUN mg/dL 20 20 21   < > 21   CREATININE mg/dL 1 55* 1 43* 1 58*   < > 1 87*   CALCIUM mg/dL 8 7 8 7 8 5   < > 8 6   ALK PHOS U/L 65  --  69  --  70   ALT U/L 105*  --  133*  --  72   AST U/L 40  --  51*  --  36    < > = values in this interval not displayed  Results from last 7 days   Lab Units 06/24/22  1320 06/24/22  0446 06/23/22  2201 06/22/22  0138 06/21/22  2101   INR   --   --   --   --  1 07   PTT seconds 70* 56* 64*   < >  --     < > = values in this interval not displayed       Results from last 7 days   Lab Units 06/24/22  0446 06/23/22  0526 06/22/22  0540   MAGNESIUM mg/dL 2 3 2 5 2 8*

## 2022-06-24 NOTE — PROGRESS NOTES
[address] [facility]  Progress Note - Shante Azevedoff 1970, 46 y o  male MRN: [id number]  Unit/Bed#:  Encounter: [id number]  Primary Care Provider: Kellen Wild MD   Date and time admitted to hospital: 6/21/2022  8:39 PM    * Atrial fibrillation [facility])  Assessment & Plan  · Presented in AF RVR with rates of 170, given cardizem bolus and started on gtt in ED with minimal repsonse  · Stopped Cardizem gtt and discontinued IV lopressor  · Given amio bolus x1 with significant hypotension requiring levophed, now off  · Started on Amio gtt in AM, tolerated well without further hemodynamic compromise  · amio gtt continued at 0 5/hr  · Received Dig load with 500mcg x1 and 250mcg q6 x2 overnight 6/22-6/23   · Continue Coreg 3 125mg BID  · Cardiology consulted, appreciate recs  · Attempted cardioversion 6/23 without success        Hypertension  Assessment & Plan  · No medications at home however patient states in the past he was told he had high BP and did not want to start BP medications at this time  · Brief severe hypotension requiring levophed, now off, holding anti-HTNs in setting of recent hypotension      Hyperlipidemia  Assessment & Plan  · Continue lipitor 10mg daily, check lipid panel in AM    [facility]  Assessment & Plan  Wt Readings from Last 3 Encounters:   06/22/22 127 kg (279 lb)   11/16/21 124 kg (274 lb)   09/11/20 127 kg (279 lb)     · No documented hx of CHF, no previous echos on file  · NT BNP 2533, no previous records on file  · Daily weights   · Clinical hx of CHF given sx of AL, orthopnea, leg swelling, worsening SOB  · TTE 6/22- LVEF 35% with moderate global hypokinesis w/ hypokinesis of anterior wall with moderate to severe MR  · Continue diuresis for net negative 500-1L       VASILE (acute kidney injury) ([facility] )  Assessment & Plan  · Baseline last documented at 1 1 on renal function from 2020-21  · Cr 1 56 on admission, relatively stable at 1 52 on evening re-check   · Trend renal function and UOP  · Continue diuresis for goal negative 500cc-1L    Alcohol abuse  Assessment & Plan  · Hx of alcohol abuse, per pt he significantly cut back on drinking and now goes months without drinking and occasionally has 2-3 mixed drinks (2 shots of vodka in cocktail) 2-3 times a week if he is drinking  · Last drink was on last Wednesday per patient  · Continue CIWA    PNA (pneumonia)  Assessment & Plan  · CT PE demonstrated RUL infiltrate concerning for PNA, started on CAP tx w/ IV rocephin/azithromycin  · De-escalated to rocpehin alone given legionella negative  · Ceftriaxone day 3 (started 6/21)        ----------------------------------------------------------------------------------------  HPI: 45 yo M PMH HTN, HLD, tobacco abuse who presented 6/22 with palpitations, SOB, AL found to be in A fib with RVR with new HFrEF 35%  Attempted cardizem, lopressor and amiodarone bolus with resultant hypotension requiring brief vasopressor support with levophed  Now on amiodarone infusion, completed digoxin load, and on coreg 3 125mg BID>      24hr events:   · Unsuccessful cardioversion  · Received 1 dose lopressor 25mg PO yesterday without improvement of Hrs  · Started on Coreg 3 125mg BID  · Rates 100-120 overnight   · 60mg IV lasix x 1 overnight for low UOP       Patient appropriate for transfer out of the ICU today?: No  Disposition: Continue Stepdown Level 1 level of care   Code Status: Level 1 - Full Code  ---------------------------------------------------------------------------------------  SUBJECTIVE  No complaints     Review of Systems  Review of systems was reviewed and negative unless stated above in HPI/24-hour events   ---------------------------------------------------------------------------------------  OBJECTIVE    Vitals   Vitals:    06/23/22 2200 06/23/22 2300 06/23/22 2320 06/24/22 0000   BP: 132/79 125/87  115/79   BP Location:       Pulse: (!) 120 (!) 121 (!) 116 (!) 119   Resp: (!) 25 (!) 28 22 (!) 26   Temp:   97 8 °F (36 6 °C)    TempSrc:   Oral    SpO2: 95% 94%  94%   Weight:       Height:         Temp (24hrs), Av 5 °F (36 9 °C), Min:97 8 °F (36 6 °C), Max:100 1 °F (37 8 °C)  Current: Temperature: 97 8 °F (36 6 °C)  Arterial Line BP: 99/89  Arterial Line MAP (mmHg): 93 mmHg    Respiratory:  SpO2: SpO2: 94 %  Nasal Cannula O2 Flow Rate (L/min): 4 L/min    Invasive/non-invasive ventilation settings   Respiratory  Report   Lab Data (Last 4 hours)    None         O2/Vent Data (Last 4 hours)    None                Physical Exam  Constitutional:       General: He is not in acute distress  Appearance: He is not ill-appearing  HENT:      Head: Normocephalic  Nose: Nose normal    Eyes:      Extraocular Movements: Extraocular movements intact  Pupils: Pupils are equal, round, and reactive to light  Cardiovascular:      Rate and Rhythm: Tachycardia present  Rhythm irregular  Pulses: Normal pulses  Pulmonary:      Effort: Pulmonary effort is normal       Breath sounds: No wheezing or rales  Abdominal:      General: Bowel sounds are normal       Palpations: Abdomen is soft  Musculoskeletal:         General: Normal range of motion  Cervical back: Neck supple  Skin:     General: Skin is warm  Neurological:      General: No focal deficit present  Mental Status: He is alert and oriented to person, place, and time               Laboratory and Diagnostics:  Results from last 7 days   Lab Units 22  0526 22  0540 22  2101   WBC Thousand/uL 14 21* 10 70* 10 24*   HEMOGLOBIN g/dL 11 9* 13 0 14 0   HEMATOCRIT % 35 3* 40 6 42 2   PLATELETS Thousands/uL 193 229 236   NEUTROS PCT % 80*  --  61   MONOS PCT % 9  --  10     Results from last 7 days   Lab Units 22  1835 22  0526 22  1811 22  1328 22  0540 22  2101   SODIUM mmol/L 137 139 140 140 140 142   POTASSIUM mmol/L 3 5 4 0 3 8 4 0 4 7 3 9   CHLORIDE mmol/L 102 104 105 106 105 106   CO2 mmol/L 25 21 18* 18* 15* 24   ANION GAP mmol/L 10 14* 17* 16* 20* 12   BUN mg/dL 20 21 21 21 21 17   CREATININE mg/dL 1 43* 1 58* 1 52* 1 51* 1 87* 1 56*   CALCIUM mg/dL 8 7 8 5 8 4 8 7 8 6 9 2   GLUCOSE RANDOM mg/dL 103 129 141* 136 286* 97   ALT U/L  --  133*  --   --  72 64   AST U/L  --  51*  --   --  36 20   ALK PHOS U/L  --  69  --   --  70 75   ALBUMIN g/dL  --  3 5  --   --  3 6 3 5   TOTAL BILIRUBIN mg/dL  --  0 47  --   --  0 48 0 59     Results from last 7 days   Lab Units 06/23/22  0526 06/22/22  0540 06/21/22  2101   MAGNESIUM mg/dL 2 5 2 8* 2 4   PHOSPHORUS mg/dL 4 2 5 1*  --       Results from last 7 days   Lab Units 06/23/22  2201 06/23/22  1258 06/23/22  0526 06/22/22  1811 06/22/22  0920 06/22/22  0138 06/21/22  2101   INR   --   --   --   --   --   --  1 07   PTT seconds 64* 66* 39* 41* 25 30  --           Results from last 7 days   Lab Units 06/22/22  1103 06/22/22  0807 06/22/22  0540 06/21/22  2101   LACTIC ACID mmol/L 0 7 3 1* 4 8* 1 8     ABG:  Results from last 7 days   Lab Units 06/22/22  1331   PH ART  7 400   PCO2 ART mm Hg 27 1*   PO2 ART mm Hg 82 8   HCO3 ART mmol/L 16 4*   BASE EXC ART mmol/L -6 9   ABG SOURCE  Line, Arterial     VBG:  Results from last 7 days   Lab Units 06/22/22  1331 06/22/22  0602 06/21/22  2325   PH DANYEL   --   --  7 371   PCO2 DANYEL mm Hg  --   --  39 9*   PO2 DANYEL mm Hg  --   --  31 3*   HCO3 DANYEL mmol/L  --   --  22 6*   BASE EXC DANYEL mmol/L  --   --  -2 4   ABG SOURCE  Line, Arterial   < >  --     < > = values in this interval not displayed  Results from last 7 days   Lab Units 06/23/22  0526 06/22/22  0221   PROCALCITONIN ng/ml 0 28* 0 07       Micro  Results from last 7 days   Lab Units 06/22/22  0220 06/22/22  0154   BLOOD CULTURE  No Growth at 24 hrs  No Growth at 24 hrs  --    LEGIONELLA URINARY ANTIGEN   --  Negative   STREP PNEUMONIAE ANTIGEN, URINE   --  Negative       EKG: per tele A fib -120s  Imaging:  I have personally reviewed pertinent reports  and I have personally reviewed pertinent films in PACS    Intake and Output  I/O       06/22 0701  06/23 0700 06/23 0701 06/24 0700    P  O  500 500    I V  (mL/kg) 909 2 (7 2) 1117 9 (8 8)    IV Piggyback 80     Total Intake(mL/kg) 1489 2 (11 7) 1617 9 (12 7)    Urine (mL/kg/hr) 2825 (0 9) 1225 (0 4)    Total Output 2825 1225    Net -1335 8 +392 9                Height and Weights   Height: 6' 2" (188 cm)  IBW (Ideal Body Weight): 82 2 kg  Body mass index is 35 82 kg/m²  Weight (last 2 days)     Date/Time Weight    06/23/22 1620 127 (279)    06/22/22 0700 127 (279)    06/22/22 0133 127 (279 32)            Nutrition       Diet Orders   (From admission, onward)             Start     Ordered    06/23/22 2204  Diet Cardiovascular; Cardiac; Fluid Restriction 2000 ML  Diet effective now        References:    Nutrtion Support Algorithm Enteral vs  Parenteral   Question Answer Comment   Diet Type Cardiovascular    Cardiac Cardiac    Other Restriction(s): Fluid Restriction 2000 ML    RD to adjust diet per protocol?  No        06/23/22 2203                  Active Medications  Scheduled Meds:  Current Facility-Administered Medications   Medication Dose Route Frequency Provider Last Rate    acetaminophen  975 mg Oral Q8H PRN Larsudha Kearns      amiodarone  400 mg Oral BID With Meals Kojo Barraza PA-C      atorvastatin  10 mg Oral Daily With Dinner Tyra Kearns      carvedilol  3 125 mg Oral BID With Meals Kojo Barraza PA-C      cefTRIAXone  2,000 mg Intravenous Q24H Goldy G Marshall 2,000 mg (67/12/86 2137)    folic acid  1 mg Oral Daily Caledonia Deiters, CRNP      furosemide  60 mg Intravenous Once MELISSA Reno      heparin (porcine)  3-20 Units/kg/hr (Order-Specific) Intravenous Titrated Louann Lime, DO 23 1 Units/kg/hr (06/23/22 2202)    heparin (porcine)  2,000 Units Intravenous Q1H PRN Louann Lime, DO      heparin (porcine)  4,000 Units Intravenous Q1H PRN Roger Palencia DO      HYDROmorphone  0 2 mg Intravenous Q4H PRN Cally Simonsernsey      insulin lispro  1-5 Units Subcutaneous TID AC Ginna Zay, JEROD      insulin lispro  1-5 Units Subcutaneous HS Ginna JEROD Collazo      metoprolol  5 mg Intravenous Q6H PRN Ginna Zay, JEROD      multivitamin-minerals  1 tablet Oral Daily Ginna Zay, [facility]      oxyCODONE  2 5 mg Oral Q4H PRN Bertrum Hickory      oxyCODONE  5 mg Oral Q4H PRN Bertrum Hickory      thiamine  100 mg Oral Daily Ginna JEROD Collazo       Continuous Infusions:  heparin (porcine), 3-20 Units/kg/hr (Order-Specific), Last Rate: 23 1 Units/kg/hr (06/23/22 2202)      PRN Meds:   acetaminophen, 975 mg, Q8H PRN  heparin (porcine), 2,000 Units, Q1H PRN  heparin (porcine), 4,000 Units, Q1H PRN  HYDROmorphone, 0 2 mg, Q4H PRN  metoprolol, 5 mg, Q6H PRN  oxyCODONE, 2 5 mg, Q4H PRN  oxyCODONE, 5 mg, Q4H PRN        Invasive Devices Review  Invasive Devices  Report    Peripheral Intravenous Line  Duration           Peripheral IV 06/21/22 Left Hand 2 days    Peripheral IV 06/22/22 Left Antecubital 1 day    Peripheral IV 06/23/22 Proximal;Right;Ventral (anterior) Forearm 1 day                Rationale for remaining devices: treatment therapy   ---------------------------------------------------------------------------------------  Advance Directive and Living Will:      Power of :    POLST:    ---------------------------------------------------------------------------------------  Care Time Delivered:   No Critical Care time spent       [facility]      Portions of the record may have been created with voice recognition software  Occasional wrong word or "sound a like" substitutions may have occurred due to the inherent limitations of voice recognition software    Read the chart carefully and recognize, using context, where substitutions have occurred

## 2022-06-24 NOTE — UTILIZATION REVIEW
Continued Stay Review    Date:  6/24/22                          Current Patient Class:  Inpatient  Current Level of Care: med surg    HPI:51 y o  male initially admitted on 6/21/22 inpatient due to atrial fib with RVR/CHF  PMH of alcohol abuse  CxR with pneumonia and on antibiotics  Treated with Cardizem, lopressor and amiodarone bolus, became hypotensive and required Levophed short time  Attempted cardioversion 6/23/22 not successful, given dig load, completed amiodarone gtt  Assessment/Plan:  6/24/22:  Remains tachycardic  Overnight rates 100 to 120  On exam tachycardic  Rhythm irregular  Tachypnea  Lungs decreased breath sounds  On amiodarone and Dig, today will switch Coreg for lopressor  Continue IV Heparin  For cardiac cath 6/27/22  Need to maximize renal status  Lasix added  No ace or arb  Continue atorvastatin  Continue Rocephin       Vital Signs:   06/24/22 15:07:33 97 7 °F (36 5 °C) 103 20 104/62 76 -- -- 94 % -- -- -- -- --   06/24/22 12:25:38 97 7 °F (36 5 °C) 56 17 111/67 82 -- -- 95 % -- -- -- -- --   06/24/22 1200 -- 107 Abnormal  -- 111/82 -- -- -- -- -- -- -- -- --   06/24/22 1100 98 1 °F (36 7 °C) 103 17 115/75 91 -- -- 94 % -- -- -- None (Room air) Lying   06/24/22 0959 -- 112 Abnormal  -- -- -- -- -- -- -- -- -- -- --   06/24/22 0900 -- 117 Abnormal  21 124/69 87 -- -- 92 % -- -- -- -- --   06/24/22 0800 -- 121 Abnormal  20 104/87 93 -- -- 94 % -- -- -- -- --   06/24/22 0700 97 8 °F (36 6 °C) 112 Abnormal  20 127/83 100 -- -- 93 % -- -- -- -- --   06/24/22 0600 -- 110 Abnormal  20 119/73 91 -- -- 94 % -- -- -- -- --   06/24/22 0500 -- 116 Abnormal  20 115/75 89 -- -- 95 % -- -- -- -- --   06/24/22 0400 -- 117 Abnormal  27 Abnormal  114/88 97 -- -- 95 % -- -- -- -- --   06/24/22 0300 97 6 °F (36 4 °C) 119 Abnormal  26 Abnormal  109/69 86 -- -- 93 % -- -- -- None (Room air) Lying   06/24/22 0200 -- 107 Abnormal  18 100/66 78 -- -- 92 % -- -- -- -- --   06/24/22 0100 -- 107 Abnormal  19 98/75 83 -- -- 92 % -- -- -- -- --   06/24/22 0000 -- 119 Abnormal  26 Abnormal  115/79 89 -- -- 94 % -- --          Pertinent Labs/Diagnostic Results:   US right upper quadrant   Final Result by Dinora Diaz DO (06/23 2153)   1  Mild pulsatility of the portal vein  This is a nonspecific finding which can be seen secondary to a variety of etiologies including congestive heart failure or hepatocellular disease  Otherwise unremarkable right upper quadrant ultrasound  Workstation performed: [id number]         CT abdomen pelvis wo contrast   Final Result by Fox Diaz MD (06/22 0417)      Stable findings of the lung bases including partially visualized mild mediastinal adenopathy  Noncontrast chest CT follow-up in 3 months is advised  No definitive evidence of acute abdominopelvic process allowing for mild motion artifact  Gallbladder wall thickening potentially due to CHF  No definitive calcified gallstones or pericholecystic inflammatory changes  Sigmoid diverticulosis  Workstation performed: [id number]         XR abdomen 1 view kub   Final Result by Tatiana Mcgovern MD (06/22 0433)      Unremarkable abdomen  Workstation performed: [id number]         XR chest portable ICU   Final Result by Tatiana Mcgovern MD (06/22 8702)      No acute cardiopulmonary disease  Findings are stable            Workstation performed: [id number]         CTA ED chest PE Study   Final Result by Chintan Montgomery MD (06/22 0143)      Patchy groundglass airspace disease throughout the right upper lobe consistent with acute infiltrate, likely infectious or inflammatory etiology      Findings consistent with CHF  Workstation performed: [id number]         XR chest 1 view portable   Final Result by Tatiana Mcgovern MD (06/22 4179)      No acute cardiopulmonary disease        Findings are stable            Workstation performed: [id number]           6/23/22 Cardioversion - Failed cardioversion despite 3 synchronized biphasic shocks at 200 -> 300 -> 300 J while pt was on Amiodarone drip  6/23/22 MIKO Left Ventricle: Left ventricular cavity size is dilated  Wall thickness is normal  Systolic function is moderately reduced - 35%  There is moderate global hypokinesis with regional variation  There was spontaneous echo contrast (smoke) seen in the LV    Left Atrium: The atrium is mildly dilated  There is no thrombus  There is mild, continuous spontaneous echo contrast     Right Atrium: The atrium is mildly dilated    Atrial Septum: No patent foramen ovale confirmed at rest by color flow Doppler    Left Atrial Appendage: There is normal function  There is no thrombus    Mitral Valve: There is moderate regurgitation    Tricuspid Valve: There is mild regurgitation    6/23/22 ecg Atrial fibrillation with rapid ventricular response  Nonspecific ST and T wave abnormality  Abnormal ECG    6/23/22 ecg Atrial fibrillation with rapid ventricular response with premature ventricular or aberrantly conducted complexes  Low voltage QRS  Nonspecific T wave abnormality  Abnormal ECG    6/22/22 echo Left Ventricle: Left ventricular cavity size is dilated  Wall thickness is normal  Systolic function is moderately reduced (35%)  There is moderate global hypokinesis with regional variation (hypokinesis of anterior wall)  Diastolic function is normal     Right Ventricle: Right ventricular cavity size is upper normal  Systolic function is normal     Left Atrium: The atrium is mildly dilated    Right Atrium: The atrium is mildly dilated    Aortic Valve: There is trace regurgitation    Mitral Valve: There is moderate to severe regurgitation    Tricuspid Valve: There is mild to moderate regurgitation  The right ventricular systolic pressure is mildly elevated (53 mm Hg)    Pulmonic Valve: There is trace regurgitation      IVC/SVC: The inferior vena cava is dilated  Respirophasic changes were blunted (less than 50% variation)      6/22/22 ecg Atrial fibrillation with rapid ventricular response  Rightward axis  Low voltage QRS  ST & T wave abnormality, consider anterolateral ischemia  Abnormal ECG  Results from last 7 days   Lab Units 06/24/22  0446 06/23/22  0526 06/22/22  0540 06/21/22  2101   WBC Thousand/uL 9 69 14 21* 10 70* 10 24*   HEMOGLOBIN g/dL 11 4* 11 9* 13 0 14 0   HEMATOCRIT % 35 2* 35 3* 40 6 42 2   PLATELETS Thousands/uL 169 193 229 236   NEUTROS ABS Thousands/µL 6 62 11 45*  --  6 30     Results from last 7 days   Lab Units 06/24/22  0446 06/23/22  1835 06/23/22  0526 06/22/22  1811 06/22/22  1328 06/22/22  0540 06/21/22  2101   SODIUM mmol/L 141 137 139 140 140 140 142   POTASSIUM mmol/L 3 6 3 5 4 0 3 8 4 0 4 7 3 9   CHLORIDE mmol/L 103 102 104 105 106 105 106   CO2 mmol/L 28 25 21 18* 18* 15* 24   ANION GAP mmol/L 10 10 14* 17* 16* 20* 12   BUN mg/dL 20 20 21 21 21 21 17   CREATININE mg/dL 1 55* 1 43* 1 58* 1 52* 1 51* 1 87* 1 56*   EGFR ml/min/1 73sq m 51 56 49 52 52 40 50   CALCIUM mg/dL 8 7 8 7 8 5 8 4 8 7 8 6 9 2   CALCIUM, IONIZED mmol/L 1 14  --  1 13  --   --   --   --    MAGNESIUM mg/dL 2 3  --  2 5  --   --  2 8* 2 4   PHOSPHORUS mg/dL 3 7  --  4 2  --   --  5 1*  --      Results from last 7 days   Lab Units 06/24/22 0446 06/23/22  0526 06/22/22  0540 06/21/22  2101   AST U/L 40 51* 36 20   ALT U/L 105* 133* 72 64   ALK PHOS U/L 65 69 70 75   TOTAL PROTEIN g/dL 7 3 7 5 7 7 7 9   ALBUMIN g/dL 3 2* 3 5 3 6 3 5   TOTAL BILIRUBIN mg/dL 0 37 0 47 0 48 0 59   BILIRUBIN DIRECT mg/dL  --   --  0 19  --      Results from last 7 days   Lab Units 06/24/22  1131 06/24/22  0720 06/23/22  2159 06/23/22  2101 06/23/22  1129 06/23/22  0738 06/22/22  2048 06/22/22  1638 06/22/22  1152 06/22/22  0806 06/22/22  0022   POC GLUCOSE mg/dl 146* 121 108 106 133 167* 140 128 150* 223* 150*     Results from last 7 days   Lab Units 06/24/22  0446 06/23/22  1835 06/23/22  0526 06/22/22  1811 06/22/22  1328 06/22/22  0540 06/21/22 2101   GLUCOSE RANDOM mg/dL 107 103 129 141* 136 286* 97     Results from last 7 days   Lab Units 06/22/22  0601   HEMOGLOBIN A1C % 5 9*   EAG mg/dl 123     Results from last 7 days   Lab Units 06/22/22  1331 06/22/22  0602   PH ART  7 400 7 363   PCO2 ART mm Hg 27 1* 22 7*   PO2 ART mm Hg 82 8 77 3   HCO3 ART mmol/L 16 4* 12 6*   BASE EXC ART mmol/L -6 9 -10 8   O2 CONTENT ART mL/dL 17 2 17 2   O2 HGB, ARTERIAL % 95 0 93 8*   ABG SOURCE  Line, Arterial Line, Arterial     Results from last 7 days   Lab Units 06/21/22  2325   PH DANYEL  7 371   PCO2 DANYEL mm Hg 39 9*   PO2 DANYEL mm Hg 31 3*   HCO3 DANYEL mmol/L 22 6*   BASE EXC DANYEL mmol/L -2 4   O2 CONTENT DANYEL ml/dL 11 1   O2 HGB, VENOUS % 54 2*     Results from last 7 days   Lab Units 06/22/22  1103 06/22/22  0807 06/22/22  0543 06/22/22  0138 06/21/22 2325 06/21/22 2101   HS TNI 0HR ng/L  --   --  23  --   --  23   HS TNI 2HR ng/L  --  32  --   --  27  --    HSTNI D2 ng/L  --  9  --   --  4  --    HS TNI 4HR ng/L 25  --   --  23  --   --    HSTNI D4 ng/L 2  --   --  0  --   --      Results from last 7 days   Lab Units 06/21/22 2101   D-DIMER QUANTITATIVE ug/ml FEU 1 56*     Results from last 7 days   Lab Units 06/24/22  1320 06/24/22  0446 06/23/22  2201 06/22/22  0138 06/21/22 2101   PROTIME seconds  --   --   --   --  13 5   INR   --   --   --   --  1 07   PTT seconds 70* 56* 64*   < >  --     < > = values in this interval not displayed       Results from last 7 days   Lab Units 06/21/22  2101   TSH 3RD GENERATON uIU/mL 1 047     Results from last 7 days   Lab Units 06/24/22  0446 06/23/22  0526 06/22/22  0221   PROCALCITONIN ng/ml 0 24 0 28* 0 07     Results from last 7 days   Lab Units 06/22/22  1103 06/22/22  0807 06/22/22  0540 06/21/22  2101   LACTIC ACID mmol/L 0 7 3 1* 4 8* 1 8     Results from last 7 days   Lab Units 06/21/22  2101   NT-PRO BNP pg/mL 2,533*     Results from last 7 days   Lab Units 06/22/22  0540   LIPASE u/L 35*     Results from last 7 days   Lab Units 06/23/22  1053 06/22/22  0200   CLARITY UA   --  Clear   COLOR UA   --  [place]   SPEC GRAV UA   --  >=1 030   PH UA   --  6 0   GLUCOSE UA mg/dl  --  Negative   KETONES UA mg/dl  --  Negative   BLOOD UA   --  Negative   PROTEIN UA mg/dl  --  Negative   NITRITE UA   --  Negative   BILIRUBIN UA   --  Negative   UROBILINOGEN UA E U /dl  --  0 2   LEUKOCYTES UA   --  Negative   CREATININE UR mg/dL 96 6  --      Results from last 7 days   Lab Units 06/22/22  0154   STREP PNEUMONIAE ANTIGEN, URINE  Negative   LEGIONELLA URINARY ANTIGEN  Negative     Results from last 7 days   Lab Units 06/22/22  0220   BLOOD CULTURE  No Growth at 48 hrs  No Growth at 48 hrs  Medications:   Scheduled Medications:  amiodarone, 400 mg, Oral, BID With Meals  atorvastatin, 10 mg, Oral, Daily With Dinner  cefTRIAXone, 2,000 mg, Intravenous, Q24H  digoxin, 125 mcg, Oral, Daily  folic acid, 1 mg, Oral, Daily  [START ON 6/25/2022] furosemide, 40 mg, Oral, Daily  insulin lispro, 1-5 Units, Subcutaneous, TID AC  insulin lispro, 1-5 Units, Subcutaneous, HS  metoprolol tartrate, 25 mg, Oral, Q12H [address]  multivitamin-minerals, 1 tablet, Oral, Daily  thiamine, 100 mg, Oral, Daily    carvedilol (COREG) tablet 3 125 mg  Dose: 3 125 mg  Freq: 2 times daily with meals Route: PO  Start: 06/23/22 1630 End: 06/24/22 0824    furosemide (LASIX) injection 60 mg  Dose: 60 mg  Freq:  Once Route: IV  Start: 06/24/22 0230 End: 06/24/22 0255    Continuous IV Infusions:  heparin (porcine), 3-20 Units/kg/hr (Order-Specific), Intravenous, Titrated      PRN Meds:  acetaminophen, 975 mg, Oral, Q8H PRN  heparin (porcine), 2,000 Units, Intravenous, Q1H PRN - x 1 6/24/22   heparin (porcine), 4,000 Units, Intravenous, Q1H PRN  metoprolol, 5 mg, Intravenous, Q6H PRN        Discharge Plan: to be determined     Network Utilization Review Department  ATTENTION: Please call with any questions or concerns to [phone] and carefully listen to the prompts so that you are directed to the right person  All voicemails are confidential   Laura Hall all requests for admission clinical reviews, approved or denied determinations and any other requests to dedicated fax number below belonging to the campus where the patient is receiving treatment   List of dedicated fax numbers for the Facilities:  [address] DENIALS (Administrative/Medical Necessity) [phone]   [address] (Maternity/NICU/Pediatrics) [phone]   [address]  [address] [facility] [place] [address] [id number] [address] Sarah Gleason 1481 [address] [facility] [phone]

## 2022-06-24 NOTE — PLAN OF CARE
Problem: MOBILITY - ADULT  Goal: Maintain or return to baseline ADL function  Description: INTERVENTIONS:  -  Assess patient's ability to carry out ADLs; assess patient's baseline for ADL function and identify physical deficits which impact ability to perform ADLs (bathing, care of mouth/teeth, toileting, grooming, dressing, etc )  - Assess/evaluate cause of self-care deficits   - Assess range of motion  - Assess patient's mobility; develop plan if impaired  - Assess patient's need for assistive devices and provide as appropriate  - Encourage maximum independence but intervene and supervise when necessary  - Involve family in performance of ADLs  - Assess for home care needs following discharge   - Consider OT consult to assist with ADL evaluation and planning for discharge  - Provide patient education as appropriate  Outcome: Progressing  Goal: Maintains/Returns to pre admission functional level  Description: INTERVENTIONS:  - Perform BMAT or MOVE assessment daily    - Set and communicate daily mobility goal to care team and patient/family/caregiver  - Collaborate with rehabilitation services on mobility goals if consulted  - Perform Range of Motion 4 times a day  - Reposition patient every 2 hours    - Dangle patient 3 times a day  - Stand patient 3 times a day  - Ambulate patient 3 times a day  - Out of bed to chair 3 times a day   - Out of bed for meals 3 times a day  - Out of bed for toileting  - Record patient progress and toleration of activity level   Outcome: Progressing     Problem: Potential for Falls  Goal: Patient will remain free of falls  Description: INTERVENTIONS:  - Educate patient/family on patient safety including physical limitations  - Instruct patient to call for assistance with activity   - Consult OT/PT to assist with strengthening/mobility   - Keep Call bell within reach  - Keep bed low and locked with side rails adjusted as appropriate  - Keep care items and personal belongings within reach  - Initiate and maintain comfort rounds  - Make Fall Risk Sign visible to staff  - Offer Toileting every 2 Hours, in advance of need  - Initiate/Maintain bed alarm  - Obtain necessary fall risk management equipment  - Apply yellow socks and bracelet for high fall risk patients  - Consider moving patient to room near nurses station  Outcome: Progressing     Problem: Prexisting or High Potential for Compromised Skin Integrity  Goal: Skin integrity is maintained or improved  Description: INTERVENTIONS:  - Identify patients at risk for skin breakdown  - Assess and monitor skin integrity  - Assess and monitor nutrition and hydration status  - Monitor labs   - Assess for incontinence   - Turn and reposition patient  - Assist with mobility/ambulation  - Relieve pressure over bony prominences  - Avoid friction and shearing  - Provide appropriate hygiene as needed including keeping skin clean and dry  - Evaluate need for skin moisturizer/barrier cream  - Collaborate with interdisciplinary team   - Patient/family teaching  - Consider wound care consult   Outcome: Progressing     Problem: PAIN - ADULT  Goal: Verbalizes/displays adequate comfort level or baseline comfort level  Description: Interventions:  - Encourage patient to monitor pain and request assistance  - Assess pain using appropriate pain scale  - Administer analgesics based on type and severity of pain and evaluate response  - Implement non-pharmacological measures as appropriate and evaluate response  - Consider cultural and social influences on pain and pain management  - Notify physician/advanced practitioner if interventions unsuccessful or patient reports new pain  Outcome: Progressing     Problem: INFECTION - ADULT  Goal: Absence or prevention of progression during hospitalization  Description: INTERVENTIONS:  - Assess and monitor for signs and symptoms of infection  - Monitor lab/diagnostic results  - Monitor all insertion sites, i e  indwelling lines, tubes, and drains  - Monitor endotracheal if appropriate and nasal secretions for changes in amount and color  - [place] appropriate cooling/warming therapies per order  - Administer medications as ordered  - Instruct and encourage patient and family to use good hand hygiene technique  - Identify and instruct in appropriate isolation precautions for identified infection/condition  Outcome: Progressing  Goal: Absence of fever/infection during neutropenic period  Description: INTERVENTIONS:  - Monitor WBC    Outcome: Progressing     Problem: DISCHARGE PLANNING  Goal: Discharge to home or other facility with appropriate resources  Description: INTERVENTIONS:  - Identify barriers to discharge w/patient and caregiver  - Arrange for needed discharge resources and transportation as appropriate  - Identify discharge learning needs (meds, wound care, etc )  - Arrange for interpretive services to assist at discharge as needed  - Refer to Case Management Department for coordinating discharge planning if the patient needs post-hospital services based on physician/advanced practitioner order or complex needs related to functional status, cognitive ability, or social support system  Outcome: Progressing     Problem: Knowledge Deficit  Goal: Patient/family/caregiver demonstrates understanding of disease process, treatment plan, medications, and discharge instructions  Description: Complete learning assessment and assess knowledge base    Interventions:  - Provide teaching at level of understanding  - Provide teaching via preferred learning methods  Outcome: Progressing     Problem: CARDIOVASCULAR - ADULT  Goal: Maintains optimal cardiac output and hemodynamic stability  Description: INTERVENTIONS:  - Monitor I/O, vital signs and rhythm  - Monitor for S/S and trends of decreased cardiac output  - Administer and titrate ordered vasoactive medications to optimize hemodynamic stability  - Assess quality of pulses, skin color and temperature  - Assess for signs of decreased coronary artery perfusion  - Instruct patient to report change in severity of symptoms  Outcome: Progressing  Goal: Absence of cardiac dysrhythmias or at baseline rhythm  Description: INTERVENTIONS:  - Continuous cardiac monitoring, vital signs, obtain 12 lead EKG if ordered  - Administer antiarrhythmic and heart rate control medications as ordered  - Monitor electrolytes and administer replacement therapy as ordered  Outcome: Progressing

## 2022-06-25 PROBLEM — E66.01 MORBID OBESITY (HCC): Status: ACTIVE | Noted: 2022-06-25

## 2022-06-25 PROBLEM — I50.21 ACUTE SYSTOLIC HEART FAILURE (HCC): Status: ACTIVE | Noted: 2022-06-25

## 2022-06-25 LAB
ANION GAP SERPL CALCULATED.3IONS-SCNC: 11 MMOL/L (ref 4–13)
APTT PPP: 54 SECONDS (ref 23–37)
APTT PPP: 63 SECONDS (ref 23–37)
APTT PPP: 65 SECONDS (ref 23–37)
APTT PPP: 77 SECONDS (ref 23–37)
BASOPHILS # BLD AUTO: 0.06 THOUSANDS/ΜL (ref 0–0.1)
BASOPHILS NFR BLD AUTO: 1 % (ref 0–1)
BUN SERPL-MCNC: 23 MG/DL (ref 5–25)
CA-I BLD-SCNC: 1.17 MMOL/L (ref 1.12–1.32)
CALCIUM SERPL-MCNC: 8.8 MG/DL (ref 8.3–10.1)
CHLORIDE SERPL-SCNC: 104 MMOL/L (ref 100–108)
CO2 SERPL-SCNC: 27 MMOL/L (ref 21–32)
CREAT SERPL-MCNC: 1.46 MG/DL (ref 0.6–1.3)
EOSINOPHIL # BLD AUTO: 0.21 THOUSAND/ΜL (ref 0–0.61)
EOSINOPHIL NFR BLD AUTO: 3 % (ref 0–6)
ERYTHROCYTE [DISTWIDTH] IN BLOOD BY AUTOMATED COUNT: 13.5 % (ref 11.6–15.1)
GFR SERPL CREATININE-BSD FRML MDRD: 54 ML/MIN/1.73SQ M
GLUCOSE SERPL-MCNC: 114 MG/DL (ref 65–140)
GLUCOSE SERPL-MCNC: 118 MG/DL (ref 65–140)
GLUCOSE SERPL-MCNC: 120 MG/DL (ref 65–140)
GLUCOSE SERPL-MCNC: 121 MG/DL (ref 65–140)
GLUCOSE SERPL-MCNC: 126 MG/DL (ref 65–140)
HCT VFR BLD AUTO: 36.9 % (ref 36.5–49.3)
HGB BLD-MCNC: 12.3 G/DL (ref 12–17)
IMM GRANULOCYTES # BLD AUTO: 0.09 THOUSAND/UL (ref 0–0.2)
IMM GRANULOCYTES NFR BLD AUTO: 1 % (ref 0–2)
LYMPHOCYTES # BLD AUTO: 2.31 THOUSANDS/ΜL (ref 0.6–4.47)
LYMPHOCYTES NFR BLD AUTO: 29 % (ref 14–44)
MAGNESIUM SERPL-MCNC: 2.3 MG/DL (ref 1.6–2.6)
MCH RBC QN AUTO: 30.6 PG (ref 26.8–34.3)
MCHC RBC AUTO-ENTMCNC: 33.3 G/DL (ref 31.4–37.4)
MCV RBC AUTO: 92 FL (ref 82–98)
MONOCYTES # BLD AUTO: 0.73 THOUSAND/ΜL (ref 0.17–1.22)
MONOCYTES NFR BLD AUTO: 9 % (ref 4–12)
NEUTROPHILS # BLD AUTO: 4.6 THOUSANDS/ΜL (ref 1.85–7.62)
NEUTS SEG NFR BLD AUTO: 57 % (ref 43–75)
NRBC BLD AUTO-RTO: 0 /100 WBCS
PHOSPHATE SERPL-MCNC: 5.2 MG/DL (ref 2.7–4.5)
PLATELET # BLD AUTO: 190 THOUSANDS/UL (ref 149–390)
PMV BLD AUTO: 12.7 FL (ref 8.9–12.7)
POTASSIUM SERPL-SCNC: 3.4 MMOL/L (ref 3.5–5.3)
RBC # BLD AUTO: 4.02 MILLION/UL (ref 3.88–5.62)
SODIUM SERPL-SCNC: 142 MMOL/L (ref 136–145)
WBC # BLD AUTO: 8 THOUSAND/UL (ref 4.31–10.16)

## 2022-06-25 PROCEDURE — 82330 ASSAY OF CALCIUM: CPT | Performed by: INTERNAL MEDICINE

## 2022-06-25 PROCEDURE — 80048 BASIC METABOLIC PNL TOTAL CA: CPT | Performed by: INTERNAL MEDICINE

## 2022-06-25 PROCEDURE — 85730 THROMBOPLASTIN TIME PARTIAL: CPT | Performed by: INTERNAL MEDICINE

## 2022-06-25 PROCEDURE — 85025 COMPLETE CBC W/AUTO DIFF WBC: CPT | Performed by: INTERNAL MEDICINE

## 2022-06-25 PROCEDURE — 82948 REAGENT STRIP/BLOOD GLUCOSE: CPT

## 2022-06-25 PROCEDURE — 99231 SBSQ HOSP IP/OBS SF/LOW 25: CPT | Performed by: INTERNAL MEDICINE

## 2022-06-25 PROCEDURE — 83735 ASSAY OF MAGNESIUM: CPT | Performed by: INTERNAL MEDICINE

## 2022-06-25 PROCEDURE — 99232 SBSQ HOSP IP/OBS MODERATE 35: CPT | Performed by: INTERNAL MEDICINE

## 2022-06-25 PROCEDURE — 84100 ASSAY OF PHOSPHORUS: CPT | Performed by: INTERNAL MEDICINE

## 2022-06-25 RX ORDER — METOPROLOL TARTRATE 50 MG/1
50 TABLET, FILM COATED ORAL EVERY 12 HOURS SCHEDULED
Status: DISCONTINUED | OUTPATIENT
Start: 2022-06-25 | End: 2022-06-28

## 2022-06-25 RX ORDER — CEFPODOXIME PROXETIL 200 MG/1
400 TABLET, FILM COATED ORAL 2 TIMES DAILY WITH MEALS
Status: DISCONTINUED | OUTPATIENT
Start: 2022-06-26 | End: 2022-06-27

## 2022-06-25 RX ORDER — POTASSIUM CHLORIDE 20 MEQ/1
40 TABLET, EXTENDED RELEASE ORAL DAILY
Status: DISCONTINUED | OUTPATIENT
Start: 2022-06-25 | End: 2022-06-28 | Stop reason: HOSPADM

## 2022-06-25 RX ADMIN — ATORVASTATIN CALCIUM 10 MG: 10 TABLET, FILM COATED ORAL at 16:22

## 2022-06-25 RX ADMIN — METOPROLOL TARTRATE 50 MG: 50 TABLET, FILM COATED ORAL at 16:22

## 2022-06-25 RX ADMIN — POTASSIUM CHLORIDE 40 MEQ: 1500 TABLET, EXTENDED RELEASE ORAL at 11:29

## 2022-06-25 RX ADMIN — FOLIC ACID 1 MG: 1 TABLET ORAL at 08:03

## 2022-06-25 RX ADMIN — AMIODARONE HYDROCHLORIDE 400 MG: 200 TABLET ORAL at 16:22

## 2022-06-25 RX ADMIN — AMIODARONE HYDROCHLORIDE 400 MG: 200 TABLET ORAL at 08:03

## 2022-06-25 RX ADMIN — METOPROLOL TARTRATE 50 MG: 50 TABLET, FILM COATED ORAL at 22:02

## 2022-06-25 RX ADMIN — CEFTRIAXONE 2000 MG: 2 INJECTION, POWDER, FOR SOLUTION INTRAMUSCULAR; INTRAVENOUS at 03:04

## 2022-06-25 RX ADMIN — THIAMINE HCL TAB 100 MG 100 MG: 100 TAB at 08:03

## 2022-06-25 RX ADMIN — FUROSEMIDE 40 MG: 40 TABLET ORAL at 08:03

## 2022-06-25 RX ADMIN — Medication 1 TABLET: at 08:03

## 2022-06-25 RX ADMIN — DIGOXIN 125 MCG: 125 TABLET ORAL at 08:03

## 2022-06-25 RX ADMIN — HEPARIN SODIUM AND DEXTROSE 25.1 UNITS/KG/HR: 10000; 5 INJECTION INTRAVENOUS at 07:09

## 2022-06-25 RX ADMIN — METOPROLOL TARTRATE 25 MG: 25 TABLET, FILM COATED ORAL at 05:54

## 2022-06-25 RX ADMIN — HEPARIN SODIUM AND DEXTROSE 25.1 UNITS/KG/HR: 10000; 5 INJECTION INTRAVENOUS at 18:50

## 2022-06-25 NOTE — ASSESSMENT & PLAN NOTE
Likely secondary to hypoperfusion due to acute CHF, afib with rvr  S/p renal US, no hydronephrosis  Cr trended down  Avoid nephrotoxins and renally dose meds

## 2022-06-25 NOTE — PLAN OF CARE
Problem: MOBILITY - ADULT  Goal: Maintain or return to baseline ADL function  Description: INTERVENTIONS:  -  Assess patient's ability to carry out ADLs; assess patient's baseline for ADL function and identify physical deficits which impact ability to perform ADLs (bathing, care of mouth/teeth, toileting, grooming, dressing, etc )  - Assess/evaluate cause of self-care deficits   - Assess range of motion  - Assess patient's mobility; develop plan if impaired  - Assess patient's need for assistive devices and provide as appropriate  - Encourage maximum independence but intervene and supervise when necessary  - Involve family in performance of ADLs  - Assess for home care needs following discharge   - Consider OT consult to assist with ADL evaluation and planning for discharge  - Provide patient education as appropriate  Outcome: Progressing  Goal: Maintains/Returns to pre admission functional level  Description: INTERVENTIONS:  - Perform BMAT or MOVE assessment daily    - Set and communicate daily mobility goal to care team and patient/family/caregiver  - Collaborate with rehabilitation services on mobility goals if consulted  - Perform Range of Motion 3 times a day  - Reposition patient every 3 hours    - Dangle patient 3 times a day  - Stand patient 3 times a day  - Ambulate patient 3 times a day  - Out of bed to chair 3 times a day   - Out of bed for meals 3 times a day  - Out of bed for toileting  - Record patient progress and toleration of activity level   Outcome: Progressing     Problem: Potential for Falls  Goal: Patient will remain free of falls  Description: INTERVENTIONS:  - Educate patient/family on patient safety including physical limitations  - Instruct patient to call for assistance with activity   - Consult OT/PT to assist with strengthening/mobility   - Keep Call bell within reach  - Keep bed low and locked with side rails adjusted as appropriate  - Keep care items and personal belongings within reach  - Initiate and maintain comfort rounds  - Make Fall Risk Sign visible to staff  - Offer Toileting every 2 Hours, in advance of need  - Initiate/Maintain bed alarm  - Obtain necessary fall risk management equipment: chair alarm  - Apply yellow socks and bracelet for high fall risk patients  - Consider moving patient to room near nurses station  Outcome: Progressing     Problem: Prexisting or High Potential for Compromised Skin Integrity  Goal: Skin integrity is maintained or improved  Description: INTERVENTIONS:  - Identify patients at risk for skin breakdown  - Assess and monitor skin integrity  - Assess and monitor nutrition and hydration status  - Monitor labs   - Assess for incontinence   - Turn and reposition patient  - Assist with mobility/ambulation  - Relieve pressure over bony prominences  - Avoid friction and shearing  - Provide appropriate hygiene as needed including keeping skin clean and dry  - Evaluate need for skin moisturizer/barrier cream  - Collaborate with interdisciplinary team   - Patient/family teaching  - Consider wound care consult   Outcome: Progressing     Problem: PAIN - ADULT  Goal: Verbalizes/displays adequate comfort level or baseline comfort level  Description: Interventions:  - Encourage patient to monitor pain and request assistance  - Assess pain using appropriate pain scale  - Administer analgesics based on type and severity of pain and evaluate response  - Implement non-pharmacological measures as appropriate and evaluate response  - Consider cultural and social influences on pain and pain management  - Notify physician/advanced practitioner if interventions unsuccessful or patient reports new pain  Outcome: Progressing     Problem: INFECTION - ADULT  Goal: Absence or prevention of progression during hospitalization  Description: INTERVENTIONS:  - Assess and monitor for signs and symptoms of infection  - Monitor lab/diagnostic results  - Monitor all insertion sites, i e  indwelling lines, tubes, and drains  - Monitor endotracheal if appropriate and nasal secretions for changes in amount and color  - [place] appropriate cooling/warming therapies per order  - Administer medications as ordered  - Instruct and encourage patient and family to use good hand hygiene technique  - Identify and instruct in appropriate isolation precautions for identified infection/condition  Outcome: Progressing  Goal: Absence of fever/infection during neutropenic period  Description: INTERVENTIONS:  - Monitor WBC    Outcome: Progressing     Problem: DISCHARGE PLANNING  Goal: Discharge to home or other facility with appropriate resources  Description: INTERVENTIONS:  - Identify barriers to discharge w/patient and caregiver  - Arrange for needed discharge resources and transportation as appropriate  - Identify discharge learning needs (meds, wound care, etc )  - Arrange for interpretive services to assist at discharge as needed  - Refer to Case Management Department for coordinating discharge planning if the patient needs post-hospital services based on physician/advanced practitioner order or complex needs related to functional status, cognitive ability, or social support system  Outcome: Progressing     Problem: Knowledge Deficit  Goal: Patient/family/caregiver demonstrates understanding of disease process, treatment plan, medications, and discharge instructions  Description: Complete learning assessment and assess knowledge base    Interventions:  - Provide teaching at level of understanding  - Provide teaching via preferred learning methods  Outcome: Progressing     Problem: CARDIOVASCULAR - ADULT  Goal: Maintains optimal cardiac output and hemodynamic stability  Description: INTERVENTIONS:  - Monitor I/O, vital signs and rhythm  - Monitor for S/S and trends of decreased cardiac output  - Administer and titrate ordered vasoactive medications to optimize hemodynamic stability  - Assess quality of pulses, skin color and temperature  - Assess for signs of decreased coronary artery perfusion  - Instruct patient to report change in severity of symptoms  Outcome: Progressing  Goal: Absence of cardiac dysrhythmias or at baseline rhythm  Description: INTERVENTIONS:  - Continuous cardiac monitoring, vital signs, obtain 12 lead EKG if ordered  - Administer antiarrhythmic and heart rate control medications as ordered  - Monitor electrolytes and administer replacement therapy as ordered  Outcome: Progressing

## 2022-06-25 NOTE — ASSESSMENT & PLAN NOTE
New onset W/ RVR s/p failed cardioversion  Remains in afib, HR better controlled  Cardiology following  On digoxin, metoprolol, amiodarone  A/c with heparin gtt  nml TSH  S/p MIKO

## 2022-06-25 NOTE — PLAN OF CARE
Problem: MOBILITY - ADULT  Goal: Maintain or return to baseline ADL function  Description: INTERVENTIONS:  -  Assess patient's ability to carry out ADLs; assess patient's baseline for ADL function and identify physical deficits which impact ability to perform ADLs (bathing, care of mouth/teeth, toileting, grooming, dressing, etc )  - Assess/evaluate cause of self-care deficits   - Assess range of motion  - Assess patient's mobility; develop plan if impaired  - Assess patient's need for assistive devices and provide as appropriate  - Encourage maximum independence but intervene and supervise when necessary  - Involve family in performance of ADLs  - Assess for home care needs following discharge   - Consider OT consult to assist with ADL evaluation and planning for discharge  - Provide patient education as appropriate  Outcome: Progressing  Goal: Maintains/Returns to pre admission functional level  Description: INTERVENTIONS:  - Perform BMAT or MOVE assessment daily    - Set and communicate daily mobility goal to care team and patient/family/caregiver     - Collaborate with rehabilitation services on mobility goals if consulted    - Record patient progress and toleration of activity level   Outcome: Progressing     Problem: Potential for Falls  Goal: Patient will remain free of falls  Description: INTERVENTIONS:  - Educate patient/family on patient safety including physical limitations  - Instruct patient to call for assistance with activity   - Consult OT/PT to assist with strengthening/mobility   - Keep Call bell within reach  - Keep bed low and locked with side rails adjusted as appropriate  - Keep care items and personal belongings within reach  - Initiate and maintain comfort rounds  - Make Fall Risk Sign visible to staff    - Apply yellow socks and bracelet for high fall risk patients  - Consider moving patient to room near nurses station  Outcome: Progressing     Problem: Prexisting or High Potential for Compromised Skin Integrity  Goal: Skin integrity is maintained or improved  Description: INTERVENTIONS:  - Identify patients at risk for skin breakdown  - Assess and monitor skin integrity  - Assess and monitor nutrition and hydration status  - Monitor labs   - Assess for incontinence   - Turn and reposition patient  - Assist with mobility/ambulation  - Relieve pressure over bony prominences  - Avoid friction and shearing  - Provide appropriate hygiene as needed including keeping skin clean and dry  - Evaluate need for skin moisturizer/barrier cream  - Collaborate with interdisciplinary team   - Patient/family teaching  - Consider wound care consult   Outcome: Progressing     Problem: PAIN - ADULT  Goal: Verbalizes/displays adequate comfort level or baseline comfort level  Description: Interventions:  - Encourage patient to monitor pain and request assistance  - Assess pain using appropriate pain scale  - Administer analgesics based on type and severity of pain and evaluate response  - Implement non-pharmacological measures as appropriate and evaluate response  - Consider cultural and social influences on pain and pain management  - Notify physician/advanced practitioner if interventions unsuccessful or patient reports new pain  Outcome: Progressing     Problem: INFECTION - ADULT  Goal: Absence or prevention of progression during hospitalization  Description: INTERVENTIONS:  - Assess and monitor for signs and symptoms of infection  - Monitor lab/diagnostic results  - Monitor all insertion sites, i e  indwelling lines, tubes, and drains  - Monitor endotracheal if appropriate and nasal secretions for changes in amount and color  - [place] appropriate cooling/warming therapies per order  - Administer medications as ordered  - Instruct and encourage patient and family to use good hand hygiene technique  - Identify and instruct in appropriate isolation precautions for identified infection/condition  Outcome: Progressing  Goal: Absence of fever/infection during neutropenic period  Description: INTERVENTIONS:  - Monitor WBC    Outcome: Progressing     Problem: DISCHARGE PLANNING  Goal: Discharge to home or other facility with appropriate resources  Description: INTERVENTIONS:  - Identify barriers to discharge w/patient and caregiver  - Arrange for needed discharge resources and transportation as appropriate  - Identify discharge learning needs (meds, wound care, etc )  - Arrange for interpretive services to assist at discharge as needed  - Refer to Case Management Department for coordinating discharge planning if the patient needs post-hospital services based on physician/advanced practitioner order or complex needs related to functional status, cognitive ability, or social support system  Outcome: Progressing     Problem: Knowledge Deficit  Goal: Patient/family/caregiver demonstrates understanding of disease process, treatment plan, medications, and discharge instructions  Description: Complete learning assessment and assess knowledge base    Interventions:  - Provide teaching at level of understanding  - Provide teaching via preferred learning methods  Outcome: Progressing     Problem: CARDIOVASCULAR - ADULT  Goal: Maintains optimal cardiac output and hemodynamic stability  Description: INTERVENTIONS:  - Monitor I/O, vital signs and rhythm  - Monitor for S/S and trends of decreased cardiac output  - Administer and titrate ordered vasoactive medications to optimize hemodynamic stability  - Assess quality of pulses, skin color and temperature  - Assess for signs of decreased coronary artery perfusion  - Instruct patient to report change in severity of symptoms  Outcome: Progressing  Goal: Absence of cardiac dysrhythmias or at baseline rhythm  Description: INTERVENTIONS:  - Continuous cardiac monitoring, vital signs, obtain 12 lead EKG if ordered  - Administer antiarrhythmic and heart rate control medications as ordered  - Monitor electrolytes and administer replacement therapy as ordered  Outcome: Progressing     Problem: CARDIOVASCULAR - ADULT  Goal: Maintains optimal cardiac output and hemodynamic stability  Description: INTERVENTIONS:  - Monitor I/O, vital signs and rhythm  - Monitor for S/S and trends of decreased cardiac output  - Administer and titrate ordered vasoactive medications to optimize hemodynamic stability  - Assess quality of pulses, skin color and temperature  - Assess for signs of decreased coronary artery perfusion  - Instruct patient to report change in severity of symptoms  Outcome: Progressing     Problem: CARDIOVASCULAR - ADULT  Goal: Absence of cardiac dysrhythmias or at baseline rhythm  Description: INTERVENTIONS:  - Continuous cardiac monitoring, vital signs, obtain 12 lead EKG if ordered  - Administer antiarrhythmic and heart rate control medications as ordered  - Monitor electrolytes and administer replacement therapy as ordered  Outcome: Progressing

## 2022-06-25 NOTE — PROGRESS NOTES
[facility]  Progress Note - Perlita Langley 1970, 46 y o  male MRN: [id number]  Unit/Bed#: -01 Encounter: [id number]  Primary Care Provider: Radhika Marie MD   Date and time admitted to hospital: 6/21/2022  8:39 PM    * Atrial fibrillation [facility])  Assessment & Plan  New onset W/ RVR s/p failed cardioversion  Remains in afib, HR better controlled  Cardiology following  On digoxin, metoprolol, amiodarone  A/c with heparin gtt  nml TSH  S/p MIKO    VASILE (acute kidney injury) ([facility] )  Assessment & Plan  Likely secondary to hypoperfusion due to acute CHF, afib with rvr  S/p renal US, no hydronephrosis  Cr trended down  Avoid nephrotoxins and renally dose meds    Acute systolic heart failure ([facility] )  Assessment & Plan  Wt Readings from Last 3 Encounters:   06/25/22 123 kg (270 lb 11 6 oz)   11/16/21 124 kg (274 lb)   09/11/20 127 kg (279 lb)   Likely precipitated by afib  S/p MIKO revealing ef of 35% with hypokinesis  Planned for cardiac cath on Monday to r/o ischemia  On lasix PO 40mg daily  Euvolemic  Cont to monitor fluid status  On bb, no ace/arb secondary to VASILE        PNA (pneumonia)  Assessment & Plan  S/p CTA chest  S/p IV ctx x 4 days; azithromycin x 1 day  De-escalate to po vantin starting tomorrow for 3 more days  Neg legionella/strep  Neg blood cx x 3 days    Morbid obesity ([facility] )  Assessment & Plan  Educated on lifestyle, dietary modification    Alcohol abuse  Assessment & Plan  No signs of w/d  On ciwa  Cont thiamine/folic acid/MVI    Hypertension  Assessment & Plan  Stable      VTE Pharmacologic Prophylaxis:   Pharmacologic: Heparin Drip  Mechanical VTE Prophylaxis in Place: No    Patient Centered Rounds: I have performed bedside rounds with nursing staff today  Discussions with Specialists or Other Care Team Provider:     Education and Discussions with Family / Patient: Patient    Time Spent for Care: 30 minutes    More than 50% of total time spent on counseling and coordination of care as described above  Current Length of Stay: 4 day(s)    Current Patient Status: Inpatient   Certification Statement: The patient will continue to require additional inpatient hospital stay due to afib with rvr, acute systolic CHF, awaiting cardiac cath    Discharge Plan:     Code Status: Level 1 - Full Code      Subjective:   Remains in Afib, HR intermittently in the low 100's  Denies palpitations, cp, dyspnea    Objective:     Vitals:   Temp (24hrs), Av 9 °F (36 6 °C), Min:97 6 °F (36 4 °C), Max:98 4 °F (36 9 °C)    Temp:  [97 6 °F (36 4 °C)-98 4 °F (36 9 °C)] 97 9 °F (36 6 °C)  HR:  [] 90  Resp:  [17-20] 17  BP: (101-121)/(62-89) 121/86  SpO2:  [94 %-96 %] 94 %  Body mass index is 34 76 kg/m²  Input and Output Summary (last 24 hours): Intake/Output Summary (Last 24 hours) at 2022 1047  Last data filed at 2022 0241  Gross per 24 hour   Intake 600 ml   Output --   Net 600 ml       Physical Exam:     Physical Exam  Constitutional:       Appearance: He is obese  Cardiovascular:      Rate and Rhythm: Tachycardia present  Rhythm irregular  Pulses: Normal pulses  Heart sounds: Normal heart sounds  No murmur heard  Pulmonary:      Effort: Pulmonary effort is normal  No respiratory distress  Breath sounds: Normal breath sounds  No wheezing or rales  Abdominal:      General: Abdomen is flat  Bowel sounds are normal  There is no distension  Palpations: Abdomen is soft  Tenderness: There is no abdominal tenderness  There is no guarding  Musculoskeletal:         General: Normal range of motion  Cervical back: Normal range of motion and neck supple  Right lower leg: No edema  Left lower leg: No edema  Skin:     General: Skin is warm and dry  Neurological:      General: No focal deficit present  Mental Status: He is alert and oriented to person, place, and time  Mental status is at baseline        Cranial Nerves: No cranial nerve deficit  Motor: No weakness  Additional Data:     Labs:    Results from last 7 days   Lab Units 06/25/22  0356   WBC Thousand/uL 8 00   HEMOGLOBIN g/dL 12 3   HEMATOCRIT % 36 9   PLATELETS Thousands/uL 190   NEUTROS PCT % 57   LYMPHS PCT % 29   MONOS PCT % 9   EOS PCT % 3     Results from last 7 days   Lab Units 06/25/22  0356 06/24/22  0446   SODIUM mmol/L 142 141   POTASSIUM mmol/L 3 4* 3 6   CHLORIDE mmol/L 104 103   CO2 mmol/L 27 28   BUN mg/dL 23 20   CREATININE mg/dL 1 46* 1 55*   ANION GAP mmol/L 11 10   CALCIUM mg/dL 8 8 8 7   ALBUMIN g/dL  --  3 2*   TOTAL BILIRUBIN mg/dL  --  0 37   ALK PHOS U/L  --  65   ALT U/L  --  105*   AST U/L  --  40   GLUCOSE RANDOM mg/dL 118 107     Results from last 7 days   Lab Units 06/21/22  2101   INR  1 07     Results from last 7 days   Lab Units 06/25/22  0631 06/24/22  2056 06/24/22  1557 06/24/22  1131 06/24/22  0720 06/23/22  2159 06/23/22  2101 06/23/22  1129 06/23/22  0738 06/22/22  2048 06/22/22  1638 06/22/22  1152   POC GLUCOSE mg/dl 114 131 132 146* 121 108 106 133 167* 140 128 150*     Results from last 7 days   Lab Units 06/22/22  0601   HEMOGLOBIN A1C % 5 9*     Results from last 7 days   Lab Units 06/24/22  0446 06/23/22  0526 06/22/22  1103 06/22/22  0807 06/22/22  0540 06/22/22  0221 06/21/22  2101   LACTIC ACID mmol/L  --   --  0 7 3 1* 4 8*  --  1 8   PROCALCITONIN ng/ml 0 24 0 28*  --   --   --  0 07  --            * I Have Reviewed All Lab Data Listed Above  * Additional Pertinent Lab Tests Reviewed: All Labs Within Last 24 Hours Reviewed    Imaging:    Imaging Reports Reviewed Today Include:   Imaging Personally Reviewed by Myself Includes:      Recent Cultures (last 7 days):     Results from last 7 days   Lab Units 06/22/22  0220 06/22/22  0154   BLOOD CULTURE  No Growth at 72 hrs  No Growth at 72 hrs    --    LEGIONELLA URINARY ANTIGEN   --  Negative       Last 24 Hours Medication List:   Current Facility-Administered Medications Medication Dose Route Frequency Provider Last Rate    acetaminophen  975 mg Oral Q8H PRN Sara Kelly, JEROD      amiodarone  400 mg Oral BID With Meals JEROD Hill      atorvastatin  10 mg Oral Daily With [employer], JEROD      [START ON 6/26/2022] cefpodoxime  400 mg Oral BID With Meals Elaine Austin DO      digoxin  125 mcg Oral Daily Sara Kelly, JEROD      folic acid  1 mg Oral Daily Sara Kelly, JEROD      furosemide  40 mg Oral Daily Babs Coughlin PA-C      heparin (porcine)  3-20 Units/kg/hr (Order-Specific) Intravenous Titrated Sara Kelly, CRNP 25 1 Units/kg/hr (06/25/22 0709)    heparin (porcine)  2,000 Units Intravenous Q1H PRN Sara Kelly, CRNP      heparin (porcine)  4,000 Units Intravenous Q1H PRN Sara Kelly, CRMARY      insulin lispro  1-5 Units Subcutaneous TID AC Sara Kelly, CRNP      insulin lispro  1-5 Units Subcutaneous HS JEROD Hill      metoprolol  5 mg Intravenous Q6H PRN Sara Kelly, JEROD      metoprolol tartrate  25 mg Oral Q12H [address] Babs Coughlin PA-C      multivitamin-minerals  1 tablet Oral Daily JEROD Hill      potassium chloride  40 mEq Oral Daily Elaine Austin DO      thiamine  100 mg Oral Daily JEROD Hill          Today, Patient Was Seen By: Elaine Austin DO    ** Please Note: Dictation voice to text software may have been used in the creation of this document   **

## 2022-06-25 NOTE — ASSESSMENT & PLAN NOTE
Wt Readings from Last 3 Encounters:   06/25/22 123 kg (270 lb 11 6 oz)   11/16/21 124 kg (274 lb)   09/11/20 127 kg (279 lb)   Likely precipitated by afib  S/p MIKO revealing ef of 35% with hypokinesis  Planned for cardiac cath on Monday to r/o ischemia  On lasix PO 40mg daily  Euvolemic  Cont to monitor fluid status  On bb, no ace/arb secondary to VASILE

## 2022-06-25 NOTE — PROGRESS NOTES
Subjective:    Feels okay    Objective:    Blood pressure 125/80  Heart rate 102, irregular  Minimal rales right base  No hepatic jugular reflux  No edema  Assessment:    Still in AFib  On amiodarone, Lopressor, heparin      Plan:    Cardiac catheterization, possible cardioversion on Monday

## 2022-06-25 NOTE — ASSESSMENT & PLAN NOTE
S/p CTA chest  S/p IV ctx x 4 days; azithromycin x 1 day  De-escalate to po vantin starting tomorrow for 3 more days  Neg legionella/strep  Neg blood cx x 3 days

## 2022-06-26 LAB
ANION GAP SERPL CALCULATED.3IONS-SCNC: 11 MMOL/L (ref 4–13)
APTT PPP: 59 SECONDS (ref 23–37)
APTT PPP: 69 SECONDS (ref 23–37)
APTT PPP: 69 SECONDS (ref 23–37)
BUN SERPL-MCNC: 21 MG/DL (ref 5–25)
CALCIUM SERPL-MCNC: 9.2 MG/DL (ref 8.3–10.1)
CHLORIDE SERPL-SCNC: 106 MMOL/L (ref 100–108)
CO2 SERPL-SCNC: 24 MMOL/L (ref 21–32)
CREAT SERPL-MCNC: 1.49 MG/DL (ref 0.6–1.3)
GFR SERPL CREATININE-BSD FRML MDRD: 53 ML/MIN/1.73SQ M
GLUCOSE SERPL-MCNC: 110 MG/DL (ref 65–140)
GLUCOSE SERPL-MCNC: 112 MG/DL (ref 65–140)
GLUCOSE SERPL-MCNC: 115 MG/DL (ref 65–140)
GLUCOSE SERPL-MCNC: 126 MG/DL (ref 65–140)
GLUCOSE SERPL-MCNC: 133 MG/DL (ref 65–140)
POTASSIUM SERPL-SCNC: 3.9 MMOL/L (ref 3.5–5.3)
SODIUM SERPL-SCNC: 141 MMOL/L (ref 136–145)

## 2022-06-26 PROCEDURE — 82948 REAGENT STRIP/BLOOD GLUCOSE: CPT

## 2022-06-26 PROCEDURE — 99232 SBSQ HOSP IP/OBS MODERATE 35: CPT | Performed by: INTERNAL MEDICINE

## 2022-06-26 PROCEDURE — 85730 THROMBOPLASTIN TIME PARTIAL: CPT | Performed by: INTERNAL MEDICINE

## 2022-06-26 PROCEDURE — 99231 SBSQ HOSP IP/OBS SF/LOW 25: CPT | Performed by: INTERNAL MEDICINE

## 2022-06-26 PROCEDURE — 80048 BASIC METABOLIC PNL TOTAL CA: CPT | Performed by: INTERNAL MEDICINE

## 2022-06-26 RX ORDER — SODIUM CHLORIDE 9 MG/ML
50 INJECTION, SOLUTION INTRAVENOUS CONTINUOUS
Status: DISCONTINUED | OUTPATIENT
Start: 2022-06-27 | End: 2022-06-27

## 2022-06-26 RX ORDER — SODIUM CHLORIDE 9 MG/ML
50 INJECTION, SOLUTION INTRAVENOUS CONTINUOUS
Status: DISCONTINUED | OUTPATIENT
Start: 2022-06-26 | End: 2022-06-26

## 2022-06-26 RX ADMIN — FOLIC ACID 1 MG: 1 TABLET ORAL at 09:36

## 2022-06-26 RX ADMIN — METOPROLOL TARTRATE 50 MG: 50 TABLET, FILM COATED ORAL at 09:35

## 2022-06-26 RX ADMIN — DIGOXIN 125 MCG: 125 TABLET ORAL at 09:36

## 2022-06-26 RX ADMIN — CEFPODOXIME PROXETIL 400 MG: 200 TABLET, FILM COATED ORAL at 16:08

## 2022-06-26 RX ADMIN — METOPROLOL TARTRATE 50 MG: 50 TABLET, FILM COATED ORAL at 20:26

## 2022-06-26 RX ADMIN — AMIODARONE HYDROCHLORIDE 400 MG: 200 TABLET ORAL at 09:37

## 2022-06-26 RX ADMIN — CEFPODOXIME PROXETIL 400 MG: 200 TABLET, FILM COATED ORAL at 09:36

## 2022-06-26 RX ADMIN — HEPARIN SODIUM AND DEXTROSE 25.1 UNITS/KG/HR: 10000; 5 INJECTION INTRAVENOUS at 16:08

## 2022-06-26 RX ADMIN — Medication 1 TABLET: at 09:35

## 2022-06-26 RX ADMIN — THIAMINE HCL TAB 100 MG 100 MG: 100 TAB at 09:36

## 2022-06-26 RX ADMIN — FUROSEMIDE 40 MG: 40 TABLET ORAL at 09:36

## 2022-06-26 RX ADMIN — ATORVASTATIN CALCIUM 10 MG: 10 TABLET, FILM COATED ORAL at 16:08

## 2022-06-26 RX ADMIN — HEPARIN SODIUM AND DEXTROSE 25.1 UNITS/KG/HR: 10000; 5 INJECTION INTRAVENOUS at 06:38

## 2022-06-26 RX ADMIN — POTASSIUM CHLORIDE 40 MEQ: 1500 TABLET, EXTENDED RELEASE ORAL at 09:35

## 2022-06-26 RX ADMIN — AMIODARONE HYDROCHLORIDE 400 MG: 200 TABLET ORAL at 16:08

## 2022-06-26 NOTE — PROGRESS NOTES
Subjective:  Feels okay  Objective:  Heart rate 85-90  Lungs clear  Irregular rhythm    Blood pressure 115/84  Assessment:  Stable  Cardiomyopathy  Plan:    Cardiac cath AM  MIKO/CV on Tuesday  Heparin to [address] after cath

## 2022-06-26 NOTE — ASSESSMENT & PLAN NOTE
Likely secondary to hypoperfusion due to acute CHF, afib with rvr  S/p renal US, no hydronephrosis  Cr trended down  Avoid nephrotoxins and renally dose meds  On gentle IVF for renal protection for pending cath

## 2022-06-26 NOTE — PLAN OF CARE
Problem: CARDIOVASCULAR - ADULT  Goal: Maintains optimal cardiac output and hemodynamic stability  Description: INTERVENTIONS:  - Monitor I/O, vital signs and rhythm  - Monitor for S/S and trends of decreased cardiac output  - Administer and titrate ordered vasoactive medications to optimize hemodynamic stability  - Assess quality of pulses, skin color and temperature  - Assess for signs of decreased coronary artery perfusion  - Instruct patient to report change in severity of symptoms  Outcome: Progressing  Goal: Absence of cardiac dysrhythmias or at baseline rhythm  Description: INTERVENTIONS:  - Continuous cardiac monitoring, vital signs, obtain 12 lead EKG if ordered  - Administer antiarrhythmic and heart rate control medications as ordered  - Monitor electrolytes and administer replacement therapy as ordered  Outcome: Progressing     Problem: METABOLIC, FLUID AND ELECTROLYTES - ADULT  Goal: Glucose maintained within target range  Description: INTERVENTIONS:  - Monitor Blood Glucose as ordered  - Assess for signs and symptoms of hyperglycemia and hypoglycemia  - Administer ordered medications to maintain glucose within target range  - Assess nutritional intake and initiate nutrition service referral as needed  Outcome: Progressing

## 2022-06-26 NOTE — PROGRESS NOTES
PTT 59 this morning  Per order, rebolus and increase drip by 2 units  Pt is on ACS low and he is already maxed out on 25 1 units  Per Dr  Parveen Kincaid with cardiology, no need to titrate or rebolus, keep drip running at current 25 1 units

## 2022-06-26 NOTE — PROGRESS NOTES
[address] [facility]  Progress Note - Rozelle Reason 1970, 46 y o  male MRN: [id number]  Unit/Bed#: -01 Encounter: [id number]  Primary Care Provider: Lillian Kramer MD   Date and time admitted to hospital: 6/21/2022  8:39 PM    * Atrial fibrillation [facility])  Assessment & Plan  New onset W/ RVR s/p failed cardioversion  Remains in afib, HR better controlled  Cardiology following; Planned at reattempt of cardioverson on Tuesday  On digoxin, metoprolol, amiodarone  A/c with heparin gtt; to transition to [address] after cath  nml TSH  S/p MIKO    [facility]acute [facility]) ([facility] )  Assessment & Plan  Likely secondary to hypoperfusion due to acute CHF, afib with rvr  S/p renal US, no hydronephrosis  Cr trended down  Avoid nephrotoxins and renally dose meds  On gentle IVF for renal protection for pending cath    Acute systolic heart failure ([facility] )  Assessment & Plan  Wt Readings from Last 3 Encounters:   06/26/22 124 kg (272 lb 11 3 oz)   11/16/21 124 kg (274 lb)   09/11/20 127 kg (279 lb)   Likely precipitated by afib  S/p MIKO revealing ef of 35% with hypokinesis  Planned for cardiac cath on Monday to r/o ischemia  On lasix PO 40mg daily  Euvolemic  Cont to monitor fluid status  On bb, no ace/arb secondary to VASILE        PNA (pneumonia)  Assessment & Plan  S/p CTA chest  S/p IV ctx x 4 days; azithromycin x 1 day  De-escalate to po vantin starting today for a total of 7 days  Neg legionella/strep  Neg blood cx x 3 days    Morbid obesity ([facility] )  Assessment & Plan  Educated on lifestyle, dietary modification    Alcohol abuse  Assessment & Plan  No signs of w/d  On ciwa  Cont thiamine/folic acid/MVI  Educated on cessation    Hypertension  Assessment & Plan  Stable      VTE Pharmacologic Prophylaxis:   Pharmacologic: Heparin Drip  Mechanical VTE Prophylaxis in Place: No    Patient Centered Rounds: I have performed bedside rounds with nursing staff today      Discussions with Specialists or Other Care Team Provider: Education and Discussions with Family / Patient: Patient and his wife    Time Spent for Care: 30 minutes  More than 50% of total time spent on counseling and coordination of care as described above  Current Length of Stay: 5 day(s)    Current Patient Status: Inpatient   Certification Statement: The patient will continue to require additional inpatient hospital stay due to Pending cardiac cath, DCCV    Discharge Plan: On wednesday    Code Status: Level 1 - Full Code      Subjective:   No cardiac complaints  Remains in Afib    Objective:     Vitals:   Temp (24hrs), Av °F (36 7 °C), Min:97 7 °F (36 5 °C), Max:98 4 °F (36 9 °C)    Temp:  [97 7 °F (36 5 °C)-98 4 °F (36 9 °C)] 97 7 °F (36 5 °C)  HR:  [] 109  Resp:  [16-20] 19  BP: (106-123)/(73-85) 114/82  SpO2:  [94 %-96 %] 94 %  Body mass index is 35 01 kg/m²  Input and Output Summary (last 24 hours): Intake/Output Summary (Last 24 hours) at 2022 1254  Last data filed at 2022 8628  Gross per 24 hour   Intake 1857 88 ml   Output 650 ml   Net 1207 88 ml       Physical Exam:     Physical Exam  Constitutional:       Appearance: He is obese  Cardiovascular:      Rate and Rhythm: Normal rate  Rhythm irregular  Pulses: Normal pulses  Heart sounds: Normal heart sounds  Pulmonary:      Effort: Pulmonary effort is normal  No respiratory distress  Breath sounds: Normal breath sounds  No wheezing or rales  Abdominal:      General: Abdomen is flat  Bowel sounds are normal  There is no distension  Palpations: Abdomen is soft  Tenderness: There is no abdominal tenderness  There is no guarding  Musculoskeletal:         General: Normal range of motion  Cervical back: Normal range of motion and neck supple  Right lower leg: No edema  Left lower leg: No edema  Skin:     General: Skin is warm and dry  Neurological:      General: No focal deficit present        Mental Status: He is alert and oriented to person, place, and time  Mental status is at baseline  Cranial Nerves: No cranial nerve deficit  Motor: No weakness  Additional Data:     Labs:    Results from last 7 days   Lab Units 06/25/22  0356   WBC Thousand/uL 8 00   HEMOGLOBIN g/dL 12 3   HEMATOCRIT % 36 9   PLATELETS Thousands/uL 190   NEUTROS PCT % 57   LYMPHS PCT % 29   MONOS PCT % 9   EOS PCT % 3     Results from last 7 days   Lab Units 06/26/22  0653 06/25/22  0356 06/24/22  0446   SODIUM mmol/L 141   < > 141   POTASSIUM mmol/L 3 9   < > 3 6   CHLORIDE mmol/L 106   < > 103   CO2 mmol/L 24   < > 28   BUN mg/dL 21   < > 20   CREATININE mg/dL 1 49*   < > 1 55*   ANION GAP mmol/L 11   < > 10   CALCIUM mg/dL 9 2   < > 8 7   ALBUMIN g/dL  --   --  3 2*   TOTAL BILIRUBIN mg/dL  --   --  0 37   ALK PHOS U/L  --   --  65   ALT U/L  --   --  105*   AST U/L  --   --  40   GLUCOSE RANDOM mg/dL 112   < > 107    < > = values in this interval not displayed  Results from last 7 days   Lab Units 06/21/22  2101   INR  1 07     Results from last 7 days   Lab Units 06/26/22  1052 06/26/22  0650 06/25/22  2058 06/25/22  1535 06/25/22  1109 06/25/22  0631 06/24/22  2056 06/24/22  1557 06/24/22  1131 06/24/22  0720 06/23/22  2159 06/23/22  2101   POC GLUCOSE mg/dl 110 115 121 126 120 114 131 132 146* 121 108 106     Results from last 7 days   Lab Units 06/22/22  0601   HEMOGLOBIN A1C % 5 9*     Results from last 7 days   Lab Units 06/24/22  0446 06/23/22  0526 06/22/22  1103 06/22/22  0807 06/22/22  0540 06/22/22  0221 06/21/22  2101   LACTIC ACID mmol/L  --   --  0 7 3 1* 4 8*  --  1 8   PROCALCITONIN ng/ml 0 24 0 28*  --   --   --  0 07  --            * I Have Reviewed All Lab Data Listed Above  * Additional Pertinent Lab Tests Reviewed:  All Labs Within Last 24 Hours Reviewed    Imaging:    Imaging Reports Reviewed Today Include:   Imaging Personally Reviewed by Myself Includes:      Recent Cultures (last 7 days):     Results from last 7 days   Lab Units 06/22/22  0220 06/22/22  0154   BLOOD CULTURE  No Growth After 4 Days  No Growth After 4 Days  --    LEGIONELLA URINARY ANTIGEN   --  Negative       Last 24 Hours Medication List:   Current Facility-Administered Medications   Medication Dose Route Frequency Provider Last Rate    acetaminophen  975 mg Oral Q8H PRN Chiara Mg, HUMPHREYNP      amiodarone  400 mg Oral BID With Meals JEROD Cruz      atorvastatin  10 mg Oral Daily With [employer], JEROD      cefpodoxime  400 mg Oral BID With Meals Saul Gore DO      digoxin  125 mcg Oral Daily JEROD Cruz      folic acid  1 mg Oral Daily Chiara Mg, JEROD      furosemide  40 mg Oral Daily Raul Bailey PA-C      heparin (porcine)  3-20 Units/kg/hr (Order-Specific) Intravenous Titrated JEROD Cruz 25 1 Units/kg/hr (06/26/22 0403)    heparin (porcine)  2,000 Units Intravenous Q1H PRN Chiara Mg, JEROD      heparin (porcine)  4,000 Units Intravenous Q1H PRN JEROD Cruz      insulin lispro  1-5 Units Subcutaneous TID AC JEROD Cruz      insulin lispro  1-5 Units Subcutaneous HS JEROD Cruz      metoprolol  5 mg Intravenous Q6H PRN JEROD Cruz      metoprolol tartrate  50 mg Oral Q12H [address] Markell Whitman MD      multivitamin-minerals  1 tablet Oral Daily JEROD Cruz      potassium chloride  40 mEq Oral Daily Saul Gore DO      [START ON 6/27/2022] sodium chloride  1 5 mL/kg Intravenous Once JEROD Grey      Followed by   Jaime Hernandez ON 6/27/2022] sodium chloride  50 mL/hr Intravenous Continuous JEROD Grey      thiamine  100 mg Oral Daily JEROD Cruz          Today, Patient Was Seen By: Saul Gore DO    ** Please Note: Dictation voice to text software may have been used in the creation of this document   **

## 2022-06-26 NOTE — CASE MANAGEMENT
Case Management Assessment & Discharge Planning Note    Patient name Koko Wells  Location [address] [phone]/-[id number] MRN [id number]  : 1970 Date 2022       Current Admission Date: 2022  Current Admission Diagnosis:Atrial fibrillation [facility])   Patient Active Problem List    Diagnosis Date Noted    Acute systolic heart failure ([facility] ) 2022    Morbid obesity ([facility] ) 2022    Hyperlipidemia 2022    CHF (congestive heart failure) ([facility] ) 2022    VASILE (acute kidney injury) ([facility] ) 2022    PNA (pneumonia) 2022    [facility] 20[facility]    Chronic cough 2021    Smoker 2020    Hypertension 2020    Alcohol abuse 2020    Psoriasis 2019    Hydradenitis 2018    Carpal tunnel syndrome 2014    Depression with anxiety 2014      LOS (days): 5  Geometric Mean LOS (GMLOS) (days):   Days to GMLOS:     OBJECTIVE:    Risk of Unplanned Readmission Score: 10 94         Current admission status: Inpatient  Referral Reason: Other ([place])    Preferred Pharmacy:   [facility] [place], [address] [place] [address] [address] [place] [address]  Phone: [phone] Fax: [phone]    Primary Care Provider: eBan Guerrero MD    Primary Insurance: BLUE CROSS  Secondary Insurance:     ASSESSMENT:  [place]rm[place]un[place] Proxies    There are no active Health Care Proxies on file         Advance Directives  Does patient have a [address]?: No  Was patient offered paperwork?: Yes (Declined)  Does patient currently have a Health Care decision maker?: Yes, please see Health Care Proxy section Miguel Nieves (Mother))  Does patient have Advance Directives?: No  Was patient offered paperwork?: Yes (Declined)  Primary Contact: Miguel Nieves (Mother)         Readmission Root Cause  30 Day Readmission: No    Patient Information  Admitted from[de-identified] Home  Mental Status: Alert  During Assessment patient was accompanied by: Not accompanied during assessment  Assessment information provided by[de-identified] Patient  Primary Caregiver: Self  Support Systems: Self, Spouse/significant other, Family members  [place] of Residence: [place] [address] do you live in?: [place] [address] entry access options   Select all that apply : Stairs  Number of steps to enter home : 3  Do the steps have railings?: Yes  Type of Current Residence: 2 story home  Upon entering residence, is there a bedroom on the main floor (no further steps)?: No  A bedroom is located on the following floor levels of residence (select all that apply):: 2nd Floor  Upon entering residence, is there a bathroom on the main floor (no further steps)?: No  Indicate which floors of current residence have a bathroom (select all the apply):: 2nd Floor  Number of steps to 2nd floor from main floor: One Flight  In the last 12 months, was there a time when you were not able to pay the mortgage or rent on time?: No  In the last 12 months, how many places have you lived?: 1  In the last 12 months, was there a time when you did not have a steady place to sleep or slept in a shelter (including now)?: No  Homeless/housing insecurity resource given?: N/A  Living Arrangements: Lives w/ Spouse/significant other, Lives w/ Parent(s)  Is patient a ?: No    Activities of Daily Living Prior to Admission  Functional Status: Independent  Completes ADLs independently?: Yes  Ambulates independently?: Yes  Does patient use assisted devices?: No  Does patient currently own DME?: No  Does patient have a history of Outpatient Therapy (PT/OT)?: No  Does the patient have a history of Short-Term Rehab?: No  Does patient have a history of HHC?: No  Does patient currently have [facility]?: No         Patient Information Continued  Income Source: Employed (Works full time as a )  Does patient have prescription coverage?: Yes (No issues with getting or affording his medications)  Within the past 12 months, you worried that your food would run out before you got the money to buy more : Never true  Within the past 12 months, the food you bought just didn't last and you didn't have money to get more : Never true  Food insecurity resource given?: N/A  Does patient receive dialysis treatments?: No  Does patient have a history of substance abuse?: No  Does patient have a history of Mental Health Diagnosis?: No         Means of Transportation  Means of Transport to Appts[de-identified] Drives Self  In the past 12 months, has lack of transportation kept you from medical appointments or from getting medications?: No  In the past 12 months, has lack of transportation kept you from meetings, work, or from getting things needed for daily living?: No  Was application for public transport provided?: N/A        DISCHARGE DETAILS:    Discharge planning discussed with[de-identified] Patient  Freedom of Choice: Yes  Comments - Freedom of Choice: No current CM DC needs were discussed or identifed  CM contacted family/caregiver?: No- see comments (Discussed with patient at plan of care and discharge planning, when asked to update friends or family members patient politely declined )  Were Treatment Team discharge recommendations reviewed with patient/caregiver?: Yes  Did patient/caregiver verbalize understanding of patient care needs?: Yes  Were patient/caregiver advised of the risks associated with not following Treatment Team discharge recommendations?: Yes    Contacts  Patient Contacts: Patient  Relationship to Patient[de-identified] Other (Comment)  Contact Method: In Person  Reason/Outcome: Discharge Planning, Continuity of [address]         Is the patient interested in [facility] at discharge?: No    DME Referral Provided  Referral made for DME?: No            No needs are identified at the time of the initial assessment,  care manager will respond upon request or reassess patient for discharge needs as appropriate  CM reviewed discharge planning process including the following: identifying caregivers at home, preference for d/c planning needs, availability of treatment team to discuss questions or concerns patient and/or family may have regarding diagnosis, plan of care, old or new medications and discharge planning  CM will continue to follow for care coordination and update assessment as necessary

## 2022-06-26 NOTE — ASSESSMENT & PLAN NOTE
S/p CTA chest  S/p IV ctx x 4 days; azithromycin x 1 day  De-escalate to po vantin starting today for a total of 7 days  Neg legionella/strep  Neg blood cx x 3 days

## 2022-06-26 NOTE — ASSESSMENT & PLAN NOTE
New onset W/ RVR s/p failed cardioversion  Remains in afib, HR better controlled  Cardiology following; Planned at reattempt of cardioverson on Tuesday  On digoxin, metoprolol, amiodarone  A/c with heparin gtt; to transition to [address] after cath  nml TSH  S/p MIKO

## 2022-06-26 NOTE — ASSESSMENT & PLAN NOTE
Wt Readings from Last 3 Encounters:   06/26/22 124 kg (272 lb 11 3 oz)   11/16/21 124 kg (274 lb)   09/11/20 127 kg (279 lb)   Likely precipitated by afib  S/p MIKO revealing ef of 35% with hypokinesis  Planned for cardiac cath on Monday to r/o ischemia  On lasix PO 40mg daily  Euvolemic  Cont to monitor fluid status  On bb, no ace/arb secondary to VASILE

## 2022-06-27 PROBLEM — L25.9 CONTACT DERMATITIS: Status: ACTIVE | Noted: 2022-06-27

## 2022-06-27 PROBLEM — I25.10 CORONARY ARTERY DISEASE: Status: ACTIVE | Noted: 2022-06-27

## 2022-06-27 PROBLEM — R73.03 PRE-DIABETES: Status: ACTIVE | Noted: 2022-06-27

## 2022-06-27 LAB
ANION GAP SERPL CALCULATED.3IONS-SCNC: 11 MMOL/L (ref 4–13)
APTT PPP: 56 SECONDS (ref 23–37)
APTT PPP: 82 SECONDS (ref 23–37)
BACTERIA BLD CULT: NORMAL
BACTERIA BLD CULT: NORMAL
BASOPHILS # BLD AUTO: 0.07 THOUSANDS/ΜL (ref 0–0.1)
BASOPHILS NFR BLD AUTO: 1 % (ref 0–1)
BUN SERPL-MCNC: 18 MG/DL (ref 5–25)
CALCIUM SERPL-MCNC: 9 MG/DL (ref 8.3–10.1)
CHLORIDE SERPL-SCNC: 105 MMOL/L (ref 100–108)
CO2 SERPL-SCNC: 26 MMOL/L (ref 21–32)
CREAT SERPL-MCNC: 1.56 MG/DL (ref 0.6–1.3)
EOSINOPHIL # BLD AUTO: 0.22 THOUSAND/ΜL (ref 0–0.61)
EOSINOPHIL NFR BLD AUTO: 3 % (ref 0–6)
ERYTHROCYTE [DISTWIDTH] IN BLOOD BY AUTOMATED COUNT: 13.4 % (ref 11.6–15.1)
GFR SERPL CREATININE-BSD FRML MDRD: 50 ML/MIN/1.73SQ M
GLUCOSE SERPL-MCNC: 107 MG/DL (ref 65–140)
GLUCOSE SERPL-MCNC: 109 MG/DL (ref 65–140)
GLUCOSE SERPL-MCNC: 126 MG/DL (ref 65–140)
GLUCOSE SERPL-MCNC: 129 MG/DL (ref 65–140)
HCT VFR BLD AUTO: 36.6 % (ref 36.5–49.3)
HGB BLD-MCNC: 11.8 G/DL (ref 12–17)
IMM GRANULOCYTES # BLD AUTO: 0.08 THOUSAND/UL (ref 0–0.2)
IMM GRANULOCYTES NFR BLD AUTO: 1 % (ref 0–2)
INR PPP: 1.13 (ref 0.84–1.19)
KCT BLD-ACNC: 152 SEC (ref 89–137)
KCT BLD-ACNC: 170 SEC (ref 89–137)
KCT BLD-ACNC: 269 SEC (ref 89–137)
LYMPHOCYTES # BLD AUTO: 1.86 THOUSANDS/ΜL (ref 0.6–4.47)
LYMPHOCYTES NFR BLD AUTO: 24 % (ref 14–44)
MCH RBC QN AUTO: 30 PG (ref 26.8–34.3)
MCHC RBC AUTO-ENTMCNC: 32.2 G/DL (ref 31.4–37.4)
MCV RBC AUTO: 93 FL (ref 82–98)
MONOCYTES # BLD AUTO: 0.69 THOUSAND/ΜL (ref 0.17–1.22)
MONOCYTES NFR BLD AUTO: 9 % (ref 4–12)
NEUTROPHILS # BLD AUTO: 4.92 THOUSANDS/ΜL (ref 1.85–7.62)
NEUTS SEG NFR BLD AUTO: 62 % (ref 43–75)
NRBC BLD AUTO-RTO: 0 /100 WBCS
PLATELET # BLD AUTO: 225 THOUSANDS/UL (ref 149–390)
PMV BLD AUTO: 12.8 FL (ref 8.9–12.7)
POTASSIUM SERPL-SCNC: 3.9 MMOL/L (ref 3.5–5.3)
PROTHROMBIN TIME: 14.1 SECONDS (ref 11.6–14.5)
RBC # BLD AUTO: 3.93 MILLION/UL (ref 3.88–5.62)
SODIUM SERPL-SCNC: 142 MMOL/L (ref 136–145)
SPECIMEN SOURCE: ABNORMAL
WBC # BLD AUTO: 7.84 THOUSAND/UL (ref 4.31–10.16)

## 2022-06-27 PROCEDURE — C1894 INTRO/SHEATH, NON-LASER: HCPCS | Performed by: INTERNAL MEDICINE

## 2022-06-27 PROCEDURE — 93458 L HRT ARTERY/VENTRICLE ANGIO: CPT | Performed by: INTERNAL MEDICINE

## 2022-06-27 PROCEDURE — 84540 ASSAY OF URINE/UREA-N: CPT

## 2022-06-27 PROCEDURE — C1887 CATHETER, GUIDING: HCPCS | Performed by: INTERNAL MEDICINE

## 2022-06-27 PROCEDURE — 82570 ASSAY OF URINE CREATININE: CPT

## 2022-06-27 PROCEDURE — C1769 GUIDE WIRE: HCPCS | Performed by: INTERNAL MEDICINE

## 2022-06-27 PROCEDURE — 85610 PROTHROMBIN TIME: CPT | Performed by: INTERNAL MEDICINE

## 2022-06-27 PROCEDURE — 92978 ENDOLUMINL IVUS OCT C 1ST: CPT | Performed by: INTERNAL MEDICINE

## 2022-06-27 PROCEDURE — 99153 MOD SED SAME PHYS/QHP EA: CPT | Performed by: INTERNAL MEDICINE

## 2022-06-27 PROCEDURE — 85730 THROMBOPLASTIN TIME PARTIAL: CPT | Performed by: INTERNAL MEDICINE

## 2022-06-27 PROCEDURE — 99152 MOD SED SAME PHYS/QHP 5/>YRS: CPT | Performed by: INTERNAL MEDICINE

## 2022-06-27 PROCEDURE — 85347 COAGULATION TIME ACTIVATED: CPT

## 2022-06-27 PROCEDURE — 4A023N7 MEASUREMENT OF CARDIAC SAMPLING AND PRESSURE, LEFT HEART, PERCUTANEOUS APPROACH: ICD-10-PCS | Performed by: INTERNAL MEDICINE

## 2022-06-27 PROCEDURE — 83935 ASSAY OF URINE OSMOLALITY: CPT

## 2022-06-27 PROCEDURE — 99233 SBSQ HOSP IP/OBS HIGH 50: CPT | Performed by: INTERNAL MEDICINE

## 2022-06-27 PROCEDURE — 84300 ASSAY OF URINE SODIUM: CPT

## 2022-06-27 PROCEDURE — 99223 1ST HOSP IP/OBS HIGH 75: CPT | Performed by: INTERNAL MEDICINE

## 2022-06-27 PROCEDURE — 99239 HOSP IP/OBS DSCHRG MGMT >30: CPT | Performed by: INTERNAL MEDICINE

## 2022-06-27 PROCEDURE — B2111ZZ FLUOROSCOPY OF MULTIPLE CORONARY ARTERIES USING LOW OSMOLAR CONTRAST: ICD-10-PCS | Performed by: INTERNAL MEDICINE

## 2022-06-27 PROCEDURE — 85025 COMPLETE CBC W/AUTO DIFF WBC: CPT | Performed by: INTERNAL MEDICINE

## 2022-06-27 PROCEDURE — 80048 BASIC METABOLIC PNL TOTAL CA: CPT

## 2022-06-27 PROCEDURE — C1753 CATH, INTRAVAS ULTRASOUND: HCPCS | Performed by: INTERNAL MEDICINE

## 2022-06-27 PROCEDURE — 82948 REAGENT STRIP/BLOOD GLUCOSE: CPT

## 2022-06-27 RX ORDER — ASPIRIN 81 MG/1
81 TABLET ORAL DAILY
Status: DISCONTINUED | OUTPATIENT
Start: 2022-06-28 | End: 2022-06-28 | Stop reason: HOSPADM

## 2022-06-27 RX ORDER — MIDAZOLAM HYDROCHLORIDE 2 MG/2ML
INJECTION, SOLUTION INTRAMUSCULAR; INTRAVENOUS AS NEEDED
Status: DISCONTINUED | OUTPATIENT
Start: 2022-06-27 | End: 2022-06-27 | Stop reason: HOSPADM

## 2022-06-27 RX ORDER — INSULIN LISPRO 100 [IU]/ML
1-5 INJECTION, SOLUTION INTRAVENOUS; SUBCUTANEOUS
Status: CANCELLED | OUTPATIENT
Start: 2022-06-28

## 2022-06-27 RX ORDER — SODIUM CHLORIDE 9 MG/ML
75 INJECTION, SOLUTION INTRAVENOUS CONTINUOUS
Status: DISCONTINUED | OUTPATIENT
Start: 2022-06-27 | End: 2022-06-28

## 2022-06-27 RX ORDER — VERAPAMIL HCL 2.5 MG/ML
AMPUL (ML) INTRAVENOUS AS NEEDED
Status: DISCONTINUED | OUTPATIENT
Start: 2022-06-27 | End: 2022-06-27 | Stop reason: HOSPADM

## 2022-06-27 RX ORDER — HEPARIN SODIUM 1000 [USP'U]/ML
INJECTION, SOLUTION INTRAVENOUS; SUBCUTANEOUS AS NEEDED
Status: DISCONTINUED | OUTPATIENT
Start: 2022-06-27 | End: 2022-06-27 | Stop reason: HOSPADM

## 2022-06-27 RX ORDER — METOPROLOL TARTRATE 5 MG/5ML
5 INJECTION INTRAVENOUS EVERY 6 HOURS PRN
Status: CANCELLED | OUTPATIENT
Start: 2022-06-27

## 2022-06-27 RX ORDER — FOLIC ACID 1 MG/1
1 TABLET ORAL DAILY
Status: CANCELLED | OUTPATIENT
Start: 2022-06-28

## 2022-06-27 RX ORDER — HEPARIN SODIUM 1000 [USP'U]/ML
2000 INJECTION, SOLUTION INTRAVENOUS; SUBCUTANEOUS
Status: CANCELLED | OUTPATIENT
Start: 2022-06-27

## 2022-06-27 RX ORDER — ONDANSETRON 2 MG/ML
4 INJECTION INTRAMUSCULAR; INTRAVENOUS EVERY 6 HOURS PRN
Status: CANCELLED | OUTPATIENT
Start: 2022-06-27

## 2022-06-27 RX ORDER — CLOPIDOGREL BISULFATE 75 MG/1
75 TABLET ORAL DAILY
Status: DISCONTINUED | OUTPATIENT
Start: 2022-06-28 | End: 2022-06-28 | Stop reason: HOSPADM

## 2022-06-27 RX ORDER — ONDANSETRON 2 MG/ML
4 INJECTION INTRAMUSCULAR; INTRAVENOUS EVERY 6 HOURS PRN
Status: DISCONTINUED | OUTPATIENT
Start: 2022-06-27 | End: 2022-06-28 | Stop reason: HOSPADM

## 2022-06-27 RX ORDER — CEFPODOXIME PROXETIL 200 MG/1
200 TABLET, FILM COATED ORAL 2 TIMES DAILY WITH MEALS
Status: DISCONTINUED | OUTPATIENT
Start: 2022-06-28 | End: 2022-06-28

## 2022-06-27 RX ORDER — DIGOXIN 125 MCG
125 TABLET ORAL DAILY
Status: CANCELLED | OUTPATIENT
Start: 2022-06-28

## 2022-06-27 RX ORDER — CLOPIDOGREL BISULFATE 75 MG/1
300 TABLET ORAL ONCE
Status: COMPLETED | OUTPATIENT
Start: 2022-06-27 | End: 2022-06-27

## 2022-06-27 RX ORDER — POTASSIUM CHLORIDE 20 MEQ/1
40 TABLET, EXTENDED RELEASE ORAL DAILY
Status: CANCELLED | OUTPATIENT
Start: 2022-06-28

## 2022-06-27 RX ORDER — AMIODARONE HYDROCHLORIDE 200 MG/1
400 TABLET ORAL 2 TIMES DAILY WITH MEALS
Status: CANCELLED | OUTPATIENT
Start: 2022-06-28

## 2022-06-27 RX ORDER — LANOLIN ALCOHOL/MO/W.PET/CERES
100 CREAM (GRAM) TOPICAL DAILY
Status: CANCELLED | OUTPATIENT
Start: 2022-06-28

## 2022-06-27 RX ORDER — DIPHENHYDRAMINE HYDROCHLORIDE, ZINC ACETATE 2; .1 G/100G; G/100G
CREAM TOPICAL 3 TIMES DAILY PRN
Status: DISCONTINUED | OUTPATIENT
Start: 2022-06-27 | End: 2022-06-28 | Stop reason: HOSPADM

## 2022-06-27 RX ORDER — METOPROLOL TARTRATE 50 MG/1
50 TABLET, FILM COATED ORAL EVERY 12 HOURS SCHEDULED
Status: CANCELLED | OUTPATIENT
Start: 2022-06-27

## 2022-06-27 RX ORDER — FENTANYL CITRATE 50 UG/ML
INJECTION, SOLUTION INTRAMUSCULAR; INTRAVENOUS AS NEEDED
Status: DISCONTINUED | OUTPATIENT
Start: 2022-06-27 | End: 2022-06-27 | Stop reason: HOSPADM

## 2022-06-27 RX ORDER — ASPIRIN 81 MG/1
81 TABLET ORAL DAILY
Status: CANCELLED | OUTPATIENT
Start: 2022-06-28

## 2022-06-27 RX ORDER — ACETAMINOPHEN 325 MG/1
650 TABLET ORAL EVERY 8 HOURS PRN
Status: DISCONTINUED | OUTPATIENT
Start: 2022-06-27 | End: 2022-06-27

## 2022-06-27 RX ORDER — ATORVASTATIN CALCIUM 40 MG/1
40 TABLET, FILM COATED ORAL
Status: CANCELLED | OUTPATIENT
Start: 2022-06-28

## 2022-06-27 RX ORDER — CEFPODOXIME PROXETIL 200 MG/1
200 TABLET, FILM COATED ORAL 2 TIMES DAILY WITH MEALS
Status: CANCELLED | OUTPATIENT
Start: 2022-06-28 | End: 2022-06-29

## 2022-06-27 RX ORDER — HEPARIN SODIUM 10000 [USP'U]/100ML
3-20 INJECTION, SOLUTION INTRAVENOUS
Status: CANCELLED | OUTPATIENT
Start: 2022-06-27

## 2022-06-27 RX ORDER — ACETAMINOPHEN 325 MG/1
975 TABLET ORAL EVERY 8 HOURS PRN
Status: CANCELLED | OUTPATIENT
Start: 2022-06-27

## 2022-06-27 RX ORDER — HEPARIN SODIUM 1000 [USP'U]/ML
4000 INJECTION, SOLUTION INTRAVENOUS; SUBCUTANEOUS
Status: CANCELLED | OUTPATIENT
Start: 2022-06-27

## 2022-06-27 RX ORDER — ATORVASTATIN CALCIUM 40 MG/1
40 TABLET, FILM COATED ORAL
Status: DISCONTINUED | OUTPATIENT
Start: 2022-06-28 | End: 2022-06-28 | Stop reason: HOSPADM

## 2022-06-27 RX ORDER — SODIUM CHLORIDE 9 MG/ML
75 INJECTION, SOLUTION INTRAVENOUS CONTINUOUS
Status: CANCELLED | OUTPATIENT
Start: 2022-06-27 | End: 2022-06-28

## 2022-06-27 RX ORDER — FUROSEMIDE 40 MG/1
40 TABLET ORAL DAILY
Status: CANCELLED | OUTPATIENT
Start: 2022-06-28

## 2022-06-27 RX ORDER — CLOPIDOGREL BISULFATE 75 MG/1
75 TABLET ORAL DAILY
Status: CANCELLED | OUTPATIENT
Start: 2022-06-28

## 2022-06-27 RX ORDER — NITROGLYCERIN 20 MG/100ML
INJECTION INTRAVENOUS AS NEEDED
Status: DISCONTINUED | OUTPATIENT
Start: 2022-06-27 | End: 2022-06-27 | Stop reason: HOSPADM

## 2022-06-27 RX ORDER — INSULIN LISPRO 100 [IU]/ML
1-5 INJECTION, SOLUTION INTRAVENOUS; SUBCUTANEOUS
Status: CANCELLED | OUTPATIENT
Start: 2022-06-27

## 2022-06-27 RX ORDER — LIDOCAINE WITH 8.4% SOD BICARB 0.9%(10ML)
SYRINGE (ML) INJECTION AS NEEDED
Status: DISCONTINUED | OUTPATIENT
Start: 2022-06-27 | End: 2022-06-27 | Stop reason: HOSPADM

## 2022-06-27 RX ORDER — DIPHENHYDRAMINE HYDROCHLORIDE, ZINC ACETATE 2; .1 G/100G; G/100G
CREAM TOPICAL 3 TIMES DAILY PRN
Status: CANCELLED | OUTPATIENT
Start: 2022-06-27

## 2022-06-27 RX ADMIN — HEPARIN SODIUM AND DEXTROSE 25.1 UNITS/KG/HR: 10000; 5 INJECTION INTRAVENOUS at 03:12

## 2022-06-27 RX ADMIN — SODIUM CHLORIDE 75 ML/HR: 0.9 INJECTION, SOLUTION INTRAVENOUS at 19:47

## 2022-06-27 RX ADMIN — FUROSEMIDE 40 MG: 40 TABLET ORAL at 08:37

## 2022-06-27 RX ADMIN — DIGOXIN 125 MCG: 125 TABLET ORAL at 08:35

## 2022-06-27 RX ADMIN — AMIODARONE HYDROCHLORIDE 400 MG: 200 TABLET ORAL at 17:21

## 2022-06-27 RX ADMIN — AMIODARONE HYDROCHLORIDE 400 MG: 200 TABLET ORAL at 08:35

## 2022-06-27 RX ADMIN — ATORVASTATIN CALCIUM 10 MG: 10 TABLET, FILM COATED ORAL at 17:22

## 2022-06-27 RX ADMIN — SODIUM CHLORIDE 50 ML/HR: 0.9 INJECTION, SOLUTION INTRAVENOUS at 05:55

## 2022-06-27 RX ADMIN — METOPROLOL TARTRATE 50 MG: 50 TABLET, FILM COATED ORAL at 08:35

## 2022-06-27 RX ADMIN — CLOPIDOGREL BISULFATE 300 MG: 75 TABLET ORAL at 14:41

## 2022-06-27 RX ADMIN — SODIUM CHLORIDE 186 ML: 0.9 INJECTION, SOLUTION INTRAVENOUS at 03:53

## 2022-06-27 RX ADMIN — HEPARIN SODIUM AND DEXTROSE 25.1 UNITS/KG/HR: 10000; 5 INJECTION INTRAVENOUS at 17:26

## 2022-06-27 RX ADMIN — POTASSIUM CHLORIDE 40 MEQ: 1500 TABLET, EXTENDED RELEASE ORAL at 08:34

## 2022-06-27 RX ADMIN — CEFPODOXIME PROXETIL 400 MG: 200 TABLET, FILM COATED ORAL at 17:24

## 2022-06-27 RX ADMIN — CEFPODOXIME PROXETIL 400 MG: 200 TABLET, FILM COATED ORAL at 08:44

## 2022-06-27 RX ADMIN — METOPROLOL TARTRATE 50 MG: 50 TABLET, FILM COATED ORAL at 21:27

## 2022-06-27 NOTE — UTILIZATION REVIEW
Continued Stay Review    Date: 6/25                         Current Patient Class: IP  Current Level of Care:  Tele     HPI:51 y o  male initially admitted on 6/21 for CHF     Assessment/Plan: HR intermittently in 100s   Awaiting cardiac cath   Monitor fld status   On BB , no ACE/ARB sec to VASILE   Cont heparin gtt   Plan for cardiac cath , possible cardioversion on Mon 6/27      Vital Signs:   06/25/22 22:01:53 98 3 °F (36 8 °C) 107 Abnormal  20 123/83 96 96 % -- -- None (Room air)   06/25/22 2010 -- -- -- -- -- -- -- -- None (Room air)   06/25/22 18:49:16 97 7 °F (36 5 °C) 97 19 106/73 84 96 % -- -- --   06/25/22 15:40:48 98 1 °F (36 7 °C) 70 17 107/80 89 94 % -- -- --   06/25/22 11:10:58 -- 99 17 108/76 87 95 % -- -- --   06/25/22 07:07:35 97 9 °F (36 6 °C) 90 17 121/86 98 94 % -- -- --   06/25/22 0554 -- 88 -- 115/89 -- -- -- -- --   06/25/22 0241 98 1 °F (36 7 °C) 89 17 110/76 87 96 % --           Pertinent Labs/Diagnostic Results:       Results from last 7 days   Lab Units 06/25/22  0356 06/24/22  0446 06/23/22  0526 06/22/22  0540   WBC Thousand/uL 8 00 9 69 14 21* 10 70*   HEMOGLOBIN g/dL 12 3 11 4* 11 9* 13 0   HEMATOCRIT % 36 9 35 2* 35 3* 40 6   PLATELETS Thousands/uL 190 169 193 229   NEUTROS ABS Thousands/µL 4 60 6 62 11 45*  --          Results from last 7 days   Lab Units 06/25/22  0356 06/24/22  0446 06/23/22  1835 06/23/22  0526 06/22/22  1328 06/22/22  0540 06/21/22  2101   SODIUM mmol/L 142 141 137 139   < > 140 142   POTASSIUM mmol/L 3 4* 3 6 3 5 4 0   < > 4 7 3 9   CHLORIDE mmol/L 104 103 102 104   < > 105 106   CO2 mmol/L 27 28 25 21   < > 15* 24   ANION GAP mmol/L 11 10 10 14*   < > 20* 12   BUN mg/dL 23 20 20 21   < > 21 17   CREATININE mg/dL 1 46* 1 55* 1 43* 1 58*   < > 1 87* 1 56*   EGFR ml/min/1 73sq m 54 51 56 49   < > 40 50   CALCIUM mg/dL 8 8 8 7 8 7 8 5   < > 8 6 9 2   CALCIUM, IONIZED mmol/L 1 17 1 14  --  1 13  --   --   --    MAGNESIUM mg/dL 2 3 2 3  --  2 5  --  2 8* 2 4 PHOSPHORUS mg/dL 5 2* 3 7  --  4 2  --  5 1*  --     < > = values in this interval not displayed       Results from last 7 days   Lab Units 06/24/22  0446 06/23/22  0526 06/22/22  0540 06/21/22  2101   AST U/L 40 51* 36 20   ALT U/L 105* 133* 72 64   ALK PHOS U/L 65 69 70 75   TOTAL PROTEIN g/dL 7 3 7 5 7 7 7 9   ALBUMIN g/dL 3 2* 3 5 3 6 3 5   TOTAL BILIRUBIN mg/dL 0 37 0 47 0 48 0 59   BILIRUBIN DIRECT mg/dL  --   --  0 19  --      Results from last 7 days   Lab Units 06/25/22  2058 06/25/22  1535 06/25/22  1109 06/25/22  0631 06/24/22  2056 06/24/22  1557 06/24/22  1131   POC GLUCOSE mg/dl 121 126 120 114 131 132 146*     Results from last 7 days   Lab Units 06/25/22  0356 06/24/22  0446 06/23/22  1835 06/23/22  0526 06/22/22  1811 06/22/22  1328 06/22/22  0540 06/21/22  2101   GLUCOSE RANDOM mg/dL 118 107 103 129 141* 136 286* 97     Results from last 7 days   Lab Units 06/22/22  0601   HEMOGLOBIN A1C % 5 9*   EAG mg/dl 123     No results found for: BETA-HYDROXYBUTYRATE   Results from last 7 days   Lab Units 06/22/22  1331 06/22/22  0602   PH ART  7 400 7 363   PCO2 ART mm Hg 27 1* 22 7*   PO2 ART mm Hg 82 8 77 3   HCO3 ART mmol/L 16 4* 12 6*   BASE EXC ART mmol/L -6 9 -10 8   O2 CONTENT ART mL/dL 17 2 17 2   O2 HGB, ARTERIAL % 95 0 93 8*   ABG SOURCE  Line, Arterial Line, Arterial     Results from last 7 days   Lab Units 06/21/22  2325   PH DANYEL  7 371   PCO2 DANYEL mm Hg 39 9*   PO2 DANYEL mm Hg 31 3*   HCO3 DANYEL mmol/L 22 6*   BASE EXC DANYEL mmol/L -2 4   O2 CONTENT DANYEL ml/dL 11 1   O2 HGB, VENOUS % 54 2*     Results from last 7 days   Lab Units 06/22/22  1103 06/22/22  0807 06/22/22  0543 06/22/22  0138 06/21/22  2325 06/21/22 2101   HS TNI 0HR ng/L  --   --  23  --   --  23   HS TNI 2HR ng/L  --  32  --   --  27  --    HSTNI D2 ng/L  --  9  --   --  4  --    HS TNI 4HR ng/L 25  --   --  23  --   --    HSTNI D4 ng/L 2  --   --  0  --   --      Results from last 7 days   Lab Units 06/21/22 2101   D-DIMER QUANTITATIVE ug/ml FEU 1 56*     Results from last 7 days   Lab Units 06/22/22  0138 06/21/22  2101   PROTIME seconds  --  13 5   INR   --  1 07   PTT seconds   < >  --     < > = values in this interval not displayed  Results from last 7 days   Lab Units 06/21/22  2101   TSH 3RD GENERATON uIU/mL 1 047     Results from last 7 days   Lab Units 06/24/22  0446 06/23/22  0526 06/22/22  0221   PROCALCITONIN ng/ml 0 24 0 28* 0 07     Results from last 7 days   Lab Units 06/22/22  1103 06/22/22  0807 06/22/22  0540 06/21/22  2101   LACTIC ACID mmol/L 0 7 3 1* 4 8* 1 8     Results from last 7 days   Lab Units 06/21/22  2101   NT-PRO BNP pg/mL 2,533*     Results from last 7 days   Lab Units 06/22/22  0540   LIPASE u/L 35*     Results from last 7 days   Lab Units 06/23/22  1053 06/22/22  0200   CLARITY UA   --  Clear   COLOR UA   --  [place]   SPEC GRAV UA   --  >=1 030   PH UA   --  6 0   GLUCOSE UA mg/dl  --  Negative   KETONES UA mg/dl  --  Negative   BLOOD UA   --  Negative   PROTEIN UA mg/dl  --  Negative   NITRITE UA   --  Negative   BILIRUBIN UA   --  Negative   UROBILINOGEN UA E U /dl  --  0 2   LEUKOCYTES UA   --  Negative   CREATININE UR mg/dL 96 6  --      Results from last 7 days   Lab Units 06/22/22  0154   STREP PNEUMONIAE ANTIGEN, URINE  Negative   LEGIONELLA URINARY ANTIGEN  Negative     Results from last 7 days   Lab Units 06/22/22  0220   BLOOD CULTURE  No Growth After 5 Days  No Growth After 5 Days         Medications:   Scheduled Medications:   acetaminophen  975 mg Oral Q8H PRN      amiodarone  400 mg Oral BID With Meals      atorvastatin  10 mg Oral Daily With Dinner      [START ON 6/26/2022] cefpodoxime  400 mg Oral BID With Meals      digoxin  125 mcg Oral Daily      folic acid  1 mg Oral Daily      furosemide  40 mg Oral Daily      heparin (porcine)  3-20 Units/kg/hr (Order-Specific) Intravenous Titrated 25 1 Units/kg/hr (06/25/22 0709)    heparin (porcine)  2,000 Units Intravenous Q1H PRN  heparin (porcine)  4,000 Units Intravenous Q1H PRN      insulin lispro  1-5 Units Subcutaneous TID AC      insulin lispro  1-5 Units Subcutaneous HS      metoprolol  5 mg Intravenous Q6H PRN      metoprolol tartrate  25 mg Oral Q12H [facility]      multivitamin-minerals  1 tablet Oral Daily      potassium chloride  40 mEq Oral Daily      thiamine  100 mg Oral Daily          Discharge Plan: TBD     Network Utilization Review Department  ATTENTION: Please call with any questions or concerns to [phone] and carefully listen to the prompts so that you are directed to the right person  All voicemails are confidential   Sagrario Kim all requests for admission clinical reviews, approved or denied determinations and any other requests to dedicated fax number below belonging to the campus where the patient is receiving treatment   List of dedicated fax numbers for the Facilities:  [address] DENIALS (Administrative/Medical Necessity) [phone]   [address] (Maternity/NICU/Pediatrics) [phone]   [address] [place] [address]  [address] [facility] [place] [address] [place] [address] [address] [place] [address] [address] Sarah Juarez [address] [facility] [place] [address] [phone]

## 2022-06-27 NOTE — ASSESSMENT & PLAN NOTE
Likely secondary to hypoperfusion due to acute CHF, afib with rvr  S/p renal US, no hydronephrosis  Nephrology consulted for optimization of renal function given catheterization  Avoid nephrotoxins and renally dose meds  On gentle IVF hydration

## 2022-06-27 NOTE — ASSESSMENT & PLAN NOTE
Wt Readings from Last 3 Encounters:   06/27/22 123 kg (271 lb 6 2 oz)   11/16/21 124 kg (274 lb)   09/11/20 127 kg (279 lb)   Likely precipitated by afib  S/p MIKO revealing ef of 35% with hypokinesis  Status post cardiac catheterization revealing 2 vessel disease in mid LAD, RCA  On lasix PO 40mg daily  Euvolemic  Cont to monitor fluid status as on gentle fluid hydration  On bb, no ace/arb secondary to VASILE

## 2022-06-27 NOTE — ASSESSMENT & PLAN NOTE
S/p CTA chest  S/p IV ctx x 4 days; azithromycin x 1 day  De-escalated to po vantin, last dose on 6/28  Neg blood cx

## 2022-06-27 NOTE — ASSESSMENT & PLAN NOTE
New onset W/ RVR s/p failed cardioversion  Remains in afib, HR better controlled  Cardiology following; Planned for reattempt at cardioversion vs EP eval for ablation  On digoxin, metoprolol, amiodarone  A/c with heparin gtt  nml TSH  S/p MIKO

## 2022-06-27 NOTE — ASSESSMENT & PLAN NOTE
No signs of w/d  CIWA no longer needed as out of window for withdrawal  Cont thiamine/folic acid/MVI  Educated on cessation

## 2022-06-27 NOTE — CONSULTS
NEPHROLOGY CONSULTATION NOTE    Patient: Floyd Hutchinson               Sex: male          DOA: 6/21/2022  8:39 PM   YOB: 1970        Age:  46 y o         LOS:  LOS: 6 days         REASON FOR THE REFERRAL / CONSULTATION:  Acute Kidney Injury    DATE OF CONSULTATION / SERVICE: 6/27/2022    ADMISSION DIAGNOSIS: Atrial fibrillation (HCC)     CHIEF COMPLAINT     Palpitations, shortness of breath    HPI     Floyd Hutchinson is a 46 y o  male  with a past medical history of atrial fibrillation s/p failed DCCV, HTN, HLD, and was admitted to [address] [place] [address] after presenting with palpitations and shortness of breath at home  Presented in rapid atrial fibrillation at a rate of > 170 bpm  Initiated on Cardizem and Cardiology consulted  Currently rate controlled on amiodarone, digoxin, and metoprolol  Patient also had CTA imaging on admission, which revealed CHF and he was initiated on diuretics  Patient presented with a creatinine level of 1 56 with a suspected baseline closer to 1 1  Nephrology was consulted today for management of VASILE  Peak creatinine during hospitalization was 1 87 on 6/22, which improved to 1 56 on labs today  He underwent ischemic evaluation with cardiac catheterization today with plan for possible repeat DCCV versus referral for AF ablation  He was given IVF prior to cardiac catheterization with order for IVF post-cardiac catheterization  He denies a history of chronic kidney disease  He does have a history of HTN, currently not on antihypertensive therapy at home  He reports intermittent NSAID use preceding admission  Reviewed past 24 hour events  PAST MEDICAL HISTORY     Past Medical History:   Diagnosis Date    Chest pain 9/25/2014    Eczema     Hidradenitis suppurativa 2/14/2018    Rib pain 1/28/2015       PAST SURGICAL HISTORY     History reviewed  No pertinent surgical history      ALLERGIES     No Known Allergies    SOCIAL HISTORY     Social History     Substance and Sexual Activity   Alcohol Use Yes    Comment: social; moderate     Social History     Substance and Sexual Activity   Drug Use No     Social History     Tobacco Use   Smoking Status Former Smoker    Packs/day: 1 00    Years: 20 00    Pack years: 20 00   Smokeless Tobacco Former User       FAMILY HISTORY     Family History   Problem Relation Age of Onset    Mental illness Mother         denied    Substance Abuse Mother         denied    Mental illness Father         denied    Substance Abuse Father         denied    Heart disease Father         cardiac disorder       CURRENT MEDICATIONS       Current Facility-Administered Medications:     acetaminophen (TYLENOL) tablet 975 mg, 975 mg, Oral, Q8H PRN, JEROD Burrell    amiodarone tablet 400 mg, 400 mg, Oral, BID With Meals, JEROD Burrell, 400 mg at 06/27/22 0835    [START ON 6/28/2022] aspirin (ECOTRIN LOW STRENGTH) EC tablet 81 mg, 81 mg, Oral, Daily, Chuckie Castellanos PA-C    atorvastatin (LIPITOR) tablet 10 mg, 10 mg, Oral, Daily With Dinner, JEROD Burrell, 10 mg at 06/26/22 1608    cefpodoxime (VANTIN) tablet 400 mg, 400 mg, Oral, BID With Meals, Jazmine Santiago DO, 400 mg at 06/27/22 0844    clopidogrel (PLAVIX) tablet 300 mg, 300 mg, Oral, Once, Jeremias Maloney PA-C    [START ON 6/28/2022] clopidogrel (PLAVIX) tablet 75 mg, 75 mg, Oral, Daily, Chuckie Castellanos PA-C    digoxin (LANOXIN) tablet 125 mcg, 125 mcg, Oral, Daily, JEROD Burrell, 125 mcg at 24/66/06 3005    folic acid (FOLVITE) tablet 1 mg, 1 mg, Oral, Daily, JEROD Burrell, 1 mg at 06/26/22 0936    furosemide (LASIX) tablet 40 mg, 40 mg, Oral, Daily, Chuckie Castellanos PA-C, 40 mg at 06/27/22 0837    heparin (porcine) 25,000 units in D5W 250 mL infusion (premix), 3-20 Units/kg/hr (Order-Specific), Intravenous, Titrated, JEROD Burrell, Last Rate: 22 6 mL/hr at 06/27/22 0639, 25 1 Units/kg/hr at 06/27/22 0639    heparin (porcine) injection 2,000 Units, 2,000 Units, Intravenous, Q1H PRN, JEROD Ewing, 2,000 Units at 06/24/22 2149    heparin (porcine) injection 4,000 Units, 4,000 Units, Intravenous, Q1H PRN, JEROD Ewing, 4,000 Units at 06/23/22 0857    insulin lispro (HumaLOG) 100 units/mL subcutaneous injection 1-5 Units, 1-5 Units, Subcutaneous, TID AC, 1 Units at 06/23/22 0857 **AND** Fingerstick Glucose (POCT), , , TID AC, JEROD Ewing    insulin lispro (HumaLOG) 100 units/mL subcutaneous injection 1-5 Units, 1-5 Units, Subcutaneous, HS, JEROD Ewing    metoprolol (LOPRESSOR) injection 5 mg, 5 mg, Intravenous, Q6H PRN, JEROD Ewing, 5 mg at 06/23/22 0857    metoprolol tartrate (LOPRESSOR) tablet 50 mg, 50 mg, Oral, Q12H [address], Hany Galan MD, 50 mg at 06/27/22 0835    multivitamin-minerals (CENTRUM) tablet 1 tablet, 1 tablet, Oral, Daily, JEROD Ewing, 1 tablet at 06/26/22 0935    ondansetron (ZOFRAN) injection 4 mg, 4 mg, Intravenous, Q6H PRN, Veronica Miller MD    potassium chloride (K-DUR,KLOR-CON) CR tablet 40 mEq, 40 mEq, Oral, Daily, Amy Martinez DO, 40 mEq at 06/27/22 7833    sodium chloride 0 9 % infusion, 75 mL/hr, Intravenous, Continuous, Veronica Miller MD    thiamine tablet 100 mg, 100 mg, Oral, Daily, JEROD Ewing, 100 mg at 06/26/22 0936    REVIEW OF SYSTEMS   Review of Systems   Constitutional: Negative for activity change, chills, fatigue and fever  HENT: Negative for hearing loss, nosebleeds and trouble swallowing  Eyes: Negative for visual disturbance  Respiratory: Negative for cough and shortness of breath  Cardiovascular: Negative for chest pain and leg swelling  Gastrointestinal: Negative for abdominal pain, constipation, diarrhea, nausea and vomiting  Genitourinary: Negative for difficulty urinating, dysuria, frequency and hematuria  Musculoskeletal: Negative for back pain  Skin: Negative for color change and pallor  Neurological: Negative for dizziness, syncope, weakness and light-headedness  Psychiatric/Behavioral: Negative for confusion and sleep disturbance  The patient is not nervous/anxious  OBJECTIVE     Current Weight: Weight - Scale: 123 kg (271 lb 6 2 oz)  Vitals:    06/27/22 1111   BP:    Pulse:    Resp:    Temp:    SpO2: 98%     Body mass index is 34 84 kg/m²  Intake/Output Summary (Last 24 hours) at 6/27/2022 1319  Last data filed at 6/27/2022 1208  Gross per 24 hour   Intake 1382 4 ml   Output 1055 ml   Net 327 4 ml       PHYSICAL EXAMINATION     Physical Exam  Vitals reviewed  Constitutional:       General: He is not in acute distress  HENT:      Head: Normocephalic  Mouth/Throat:      Lips: Pink  Mouth: Mucous membranes are moist    Eyes:      General: Lids are normal  No scleral icterus  Cardiovascular:      Rate and Rhythm: Normal rate and regular rhythm  Heart sounds: S1 normal and S2 normal  No murmur heard  Pulmonary:      Effort: Pulmonary effort is normal  No accessory muscle usage or respiratory distress  Breath sounds: Normal breath sounds  Abdominal:      General: There is no distension  Tenderness: There is no abdominal tenderness  Musculoskeletal:      Cervical back: Normal range of motion and neck supple  No tenderness  Right lower leg: No edema  Left lower leg: No edema  Skin:     General: Skin is warm  Coloration: Skin is not cyanotic or jaundiced  Neurological:      General: No focal deficit present  Mental Status: He is alert and oriented to person, place, and time  Psychiatric:         Attention and Perception: Attention normal          Speech: Speech normal          Behavior: Behavior is cooperative             LAB RESULTS        Results from last 7 days   Lab Units 06/27/22  0559 06/26/22  0653 06/25/22  0356 06/24/22  0446 06/23/22  1835 06/23/22  0526 06/22/22  1811 06/22/22  1328 06/22/22  0540 06/21/22  2101   WBC Thousand/uL 7 84  --  8 00 9 69  --  14 21*  --   --  10 70* 10 24*   HEMOGLOBIN g/dL 11 8*  --  12 3 11 4*  --  11 9*  --   --  13 0 14 0   HEMATOCRIT % 36 6  --  36 9 35 2*  --  35 3*  --   --  40 6 42 2   PLATELETS Thousands/uL 225  --  190 169  --  193  --   --  229 236   POTASSIUM mmol/L 3 9 3 9 3 4* 3 6 3 5 4 0 3 8   < > 4 7 3 9   CHLORIDE mmol/L 105 106 104 103 102 104 105   < > 105 106   CO2 mmol/L 26 24 27 28 25 21 18*   < > 15* 24   BUN mg/dL 18 21 23 20 20 21 21   < > 21 17   CREATININE mg/dL 1 56* 1 49* 1 46* 1 55* 1 43* 1 58* 1 52*   < > 1 87* 1 56*   EGFR ml/min/1 73sq m 50 53 54 51 56 49 52   < > 40 50   CALCIUM mg/dL 9 0 9 2 8 8 8 7 8 7 8 5 8 4   < > 8 6 9 2   MAGNESIUM mg/dL  --   --  2 3 2 3  --  2 5  --   --  2 8* 2 4   PHOSPHORUS mg/dL  --   --  5 2* 3 7  --  4 2  --   --  5 1*  --     < > = values in this interval not displayed  Recent Labs     06/27/22  0559   WBC 7 84     Recent Labs     06/27/22  0559   HGB 11 8*     Recent Labs     06/27/22  0559   HCT 36 6     Recent Labs     06/27/22  0559        Recent Labs     06/27/22  0559   SODIUM 142     Recent Labs     06/27/22  0559   K 3 9     Recent Labs     06/27/22  0559        Recent Labs     06/27/22  0559   CO2 26     Recent Labs     06/27/22  0559   BUN 18     Recent Labs     06/27/22  0559   CREATININE 1 56*     Recent Labs     06/27/22  0559   EGFR 50     Recent Labs     06/27/22  0559   CALCIUM 9 0     Recent Labs     06/25/22  0356   MG 2 3     Recent Labs     06/25/22  0356   PHOS 5 2*     Invalid input(s): ALBUMIN  No results for input(s): PROT in the last 72 hours  No results for input(s): GLUCOSE in the last 72 hours  RADIOLOGY RESULTS     Results for orders placed during the hospital encounter of 06/21/22    XR chest 1 view portable    Narrative  CHEST    INDICATION:   tachycardia      COMPARISON:  1/28/2015    EXAM PERFORMED/VIEWS:  XR CHEST PORTABLE  Images: 2    FINDINGS:    Cardiomediastinal silhouette appears unremarkable  The lungs are clear  No pneumothorax or pleural effusion  Osseous structures appear within normal limits for patient age  Impression  No acute cardiopulmonary disease  Findings are stable        Workstation performed: [id number]    No results found for this or any previous visit  No results found for this or any previous visit  No results found for this or any previous visit  Results for orders placed during the hospital encounter of 06/21/22    CT abdomen pelvis wo contrast    Narrative  CT ABDOMEN AND PELVIS WITHOUT IV CONTRAST    INDICATION:   Abdominal pain, acute, nonlocalized  Worsening abdominal pain, pressor requirement, no significant findings on personal interpretation of KUB/CXR  COMPARISON:  CT chest 6/22/2022    TECHNIQUE:  CT examination of the abdomen and pelvis was performed without intravenous contrast  This examination was performed without intravenous contrast in the context of the critical nationwide Omnipaque shortage  Axial, sagittal, and coronal 2D  reformatted images were created from the source data and submitted for interpretation  Radiation dose length product (DLP) for this visit:  1140 mGy-cm   This examination, like all CT scans performed in the [facility], was performed utilizing techniques to minimize radiation dose exposure, including the use of iterative  reconstruction and automated exposure control  Enteric contrast was not administered  FINDINGS:    ABDOMEN    Mild motion artifact  LOWER CHEST:  Small bilateral pleural effusions with minimal basilar subsegmental atelectasis, right greater than left  Mild bibasilar smooth septal thickening  Partially visualized mild mediastinal and right hilar adenopathy  Coronary artery  calcification  LIVER/BILIARY TREE:  Unremarkable  GALLBLADDER:  No calcified gallstones  Potential contracted gallbladder with moderate gallbladder wall thickening   No pericholecystic inflammatory changes  SPLEEN:  Unremarkable  PANCREAS:  Diffuse atrophy and fatty infiltration of the pancreas  ADRENAL GLANDS:  Unremarkable  KIDNEYS/URETERS:  Excreted contrast in bilateral renal collecting systems limits evaluation for renal calculi  No hydronephrosis or perinephric collection  STOMACH AND BOWEL:  Sigmoid diverticulosis without diverticulitis  No bowel obstruction  APPENDIX:  A normal appendix was visualized  ABDOMINOPELVIC CAVITY:  No ascites  No pneumoperitoneum  No lymphadenopathy  VESSELS:  Aortoiliac calcification  No aneurysm  PELVIS    REPRODUCTIVE ORGANS:  Unremarkable for patient's age  URINARY BLADDER:  Excreted contrast in the bladder  Otherwise unremarkable  ABDOMINAL WALL/INGUINAL REGIONS:  Small fat-containing umbilical hernia  Tiny bilateral inguinal fat-containing hernias  OSSEOUS STRUCTURES:  No acute fracture or osseous destructive lesion identified  Degenerative changes of the spine, pubic symphysis, and multiple joints  Impression  Stable findings of the lung bases including partially visualized mild mediastinal adenopathy  Noncontrast chest CT follow-up in 3 months is advised  No definitive evidence of acute abdominopelvic process allowing for mild motion artifact  Gallbladder wall thickening potentially due to CHF  No definitive calcified gallstones or pericholecystic inflammatory changes  Sigmoid diverticulosis  Workstation performed: [id number]    No results found for this or any previous visit  PLAN / RECOMMENDATIONS      78-year-old male with a PMH Of AF s/p DCCV, HLD, and HTN was admitted with rapid Afib, palpitations, and shortness of breath  Nephrology consulted for management of VASILE  Acute Kidney Injury: POA  Admitted with a creatinine of 1 56  After review of medical records, baseline creatinine appears to be around 1 1 dating back to 2021     Peak creatinine during hospitalization was 1 87   Patient in rapid afib with signs of volume overload  Underwent CTA on 6/22/2022  Cardiac Catheterization was done earlier today for ischemic evaluation  Current creatine level is 1 56, did receive diuretic this morning  Work-up:  - UA: Specific gravity > 1 030, bland  - FeUrea: 46 9% indicating intrinsic   - CT abdomen and pelvis: No hydronephrosis or perinephric collection  Etiology:  Likely intrinsic ATN in the setting of decreased renal perfusion from rapid afib, volume overload, and IV contrast administration  May also be a component of prerenal etiology in the setting of high urine specific gravity on admission  Plan/Recommendations:  - Continue IVF for 6 hours following cardiac catheterization today to reduce risk of further VASILE  - Maintain MAP > 65, avoid hypoperfusion/hypotension  - Avoid further nephrotoxic agents such as NSAIDs and IV Contrast if possible  - Monitor I/O and daily weights  - Daily BMP    Hypertension:  Current BP readings overall acceptable over the past 24 hours  HFrEF:  ECHO: EF 35%, moderate global hypokinesis  Appears euvolemic on examination  Maintained on furosemide 40mg daily  Atrial Fibrillation:  Currently rate controlled amiodarone, metoprolol, and digoxin  Cardiology following, considering repeat DCCV versus referral for AF ablation  Thank you for the consultation to participate in patient's care  I have discussed this plan with my attending physician  JEROD Quinteros    6/27/2022      Portions of the record may have been created with voice recognition software  Occasional wrong word or "sound a like" substitutions may have occurred due to the inherent limitations of voice recognition software  Read the chart carefully and recognize, using context, where substitutions have occurred

## 2022-06-27 NOTE — ASSESSMENT & PLAN NOTE
Likely from linens as noted on regions where has direct contact  Prn benadryl  Low suspicion for drug allergy  No sign of anaphylaxis

## 2022-06-27 NOTE — PROGRESS NOTES
Pt was sitting up in chair watching TV, denies any needs or pain at this time  Bedside table and call bell are within reach

## 2022-06-27 NOTE — DISCHARGE SUMMARY
[address] [facility]  Discharge- Perlita Langley 1970, 46 y o  male MRN: [id number]  Unit/Bed#: -01 Encounter: [id number]  Primary Care Provider: Radhika Marie MD   Date and time admitted to hospital: 6/21/2022  8:39 PM    * Atrial fibrillation [facility])  Assessment & Plan  New onset W/ RVR s/p failed cardioversion  Remains in afib, HR better controlled  Cardiology following; Planned for reattempt at cardioversion vs EP eval for ablation  On digoxin, metoprolol, amiodarone  A/c with heparin gtt  nml TSH  S/p MIKO    Coronary artery disease  Assessment & Plan  · Status post cardiac catheterization revealing 2 vessel disease in LAD and RCA  · Planned for transfer to [facility] for therapeutic cardiac catheterization using atherectomy given severe calcification in LAD  · Status post loading dose of Plavix  · Continue on Plavix, aspirin, Lipitor, beta-blocker  · On heparin drip  · Patient is chest pain-free    Acute systolic heart failure ([facility] [address] )  Assessment & Plan  Wt Readings from Last 3 Encounters:   06/27/22 123 kg (271 lb 6 2 oz)   11/16/21 124 kg (274 lb)   09/11/20 127 kg (279 lb)   Likely precipitated by afib  S/p MIKO revealing ef of 35% with hypokinesis  Status post cardiac catheterization revealing 2 vessel disease in mid LAD, RCA  On lasix PO 40mg daily  Euvolemic  Cont to monitor fluid status as on gentle fluid hydration  On bb, no ace/arb secondary to VASILE        PNA (pneumonia)  Assessment & Plan  S/p CTA chest  S/p IV ctx x 4 days; azithromycin x 1 day  De-escalated to po vantin, last dose on 6/28  Neg blood cx    VASILE (acute kidney injury) ([facility] [address] )  Assessment & Plan  Likely secondary to hypoperfusion due to acute CHF, afib with rvr  S/p renal US, no hydronephrosis  Nephrology consulted for optimization of renal function given catheterization  Avoid nephrotoxins and renally dose meds  On gentle IVF hydration    Hypertension  Assessment & Plan  Stable    Alcohol abuse  Assessment & Plan  No signs of w/d  CIWA no longer needed as out of window for withdrawal  Cont thiamine/folic acid/MVI  Educated on cessation    Morbid obesity ([address] )  Assessment & Plan  Educated on lifestyle, dietary modification    Contact dermatitis  Assessment & Plan  Likely from linens as noted on regions where has direct contact  Prn benadryl  Low suspicion for drug allergy  No sign of anaphylaxis     Pre-diabetes  Assessment & Plan  Hemoglobin A1c of 5 9  On insulin sliding scale  Nutrition consult    Discharging Physician / Practitioner: Les Mcdonald DO  PCP: David Landaverde MD  Admission Date:   Admission Orders (From admission, onward)     Ordered        06/21/22 2348  Inpatient Admission  Once                      Discharge Date: 06/27/22    Medical Problems             Resolved Problems  Date Reviewed: 6/27/2022   None                 Consultations During Hospital Stay:  · Cardiology  · Nephrology    Procedures Performed:   · MIKO  · Cardioversion  · Cardiac catheterization  · CT abdomen pelvis  · Renal ultrasound    Significant Findings / Test Results:   · See above    Incidental Findings:   ·      Test Results Pending at Discharge (will require follow up):   ·      Outpatient Tests Requested:  ·     Complications:  None    Reason for Admission:     Hospital Course:     HPI: Shannon Thomason is a 46 y o  male who presented to ED in AF w/ RVR with rates in 170s  Sx of palpitations and SOB x4 d at home, chronically experiences AL, orthopnea and intermittent leg swelling at home  No prior echos, does not see a cardiologist, sees PCP occasionally  Father with hx of MI, AF, CHF  Given 1L of crystalloid and cardizem bolus and gtt in ED  Given 5mg lopressor IV and 40mg Lasix IV  D dimer in ED elevated at 1 56,       Please see above list of diagnoses and related plan for additional information  Condition at Discharge: stable     Discharge Day Visit / Exam:     Subjective:  Status post cardiac catheterization  Denies chest pain or associated symptoms  Complains rash on skin regions in contact with bed linens  Positive pruritus  Denies facial or tongue edema     Vitals: Blood Pressure: 121/81 (06/27/22 1900)  Pulse: 96 (06/27/22 1900)  Temperature: 97 5 °F (36 4 °C) (06/27/22 1900)  Temp Source: Oral (06/27/22 1900)  Respirations: 18 (06/27/22 1900)  Height: 6' 2" (188 cm) (06/23/22 1620)  Weight - Scale: 123 kg (271 lb 6 2 oz) (06/27/22 0600)  SpO2: 96 % (06/27/22 1900)  Exam:   Physical Exam  Constitutional:       Appearance: He is obese  Cardiovascular:      Rate and Rhythm: Normal rate and regular rhythm  Pulses: Normal pulses  Heart sounds: Normal heart sounds  No murmur heard  Pulmonary:      Effort: Pulmonary effort is normal  No respiratory distress  Breath sounds: Normal breath sounds  No wheezing or rales  Abdominal:      General: Abdomen is flat  Bowel sounds are normal  There is no distension  Palpations: Abdomen is soft  Tenderness: There is no abdominal tenderness  There is no guarding  Musculoskeletal:         General: Normal range of motion  Cervical back: Normal range of motion and neck supple  Right lower leg: No edema  Left lower leg: No edema  Skin:     General: Skin is warm and dry  Comments: Diffuse macular rash on trunk and back   Neurological:      General: No focal deficit present  Mental Status: He is alert and oriented to person, place, and time  Mental status is at baseline  Cranial Nerves: No cranial nerve deficit  Motor: No weakness  Discussion with Family:  Discussed with patient and his wife    Discharge instructions/Information to patient and family:   See after visit summary for information provided to patient and family  Provisions for Follow-Up Care:  See after visit summary for information related to follow-up care and any pertinent home health orders        Disposition:     [address] Transfer [facility] SNF:   · Not Applicable to this Patient - Not Applicable to this Patient    Planned Readmission:  Yes     Discharge Statement:  I spent 35 minutes discharging the patient  This time was spent on the day of discharge  I had direct contact with the patient on the day of discharge  Greater than 50% of the total time was spent examining patient, answering all patient questions, arranging and discussing plan of care with patient as well as directly providing post-discharge instructions  Additional time then spent on discharge activities  Discharge Medications:  See after visit summary for reconciled discharge medications provided to patient and family        ** Please Note: This note has been constructed using a voice recognition system **

## 2022-06-27 NOTE — PROGRESS NOTES
Cardiology Progress Note   Coco Javier 46 y o  male MRN: [id number]    Unit/Bed#: -01 Encounter: [id number]      Assessment:  1  New onset atrial fibrillation with RVR (s/p unsuccessful DCCV x3; 6/23/2022)  2  Acute systolic CHF   3  Cardiomyopathy, undifferentiated, EF 35%  4  Pneumonia  5  VASILE  6  Hyperlipidemia    Plan:  Plan for cardiac catheterization today to define his coronary anatomy  Plan for DCCV re-attempt tomorrow to establish NSR based on cardiac catheterization findings  Continue amiodarone 400 mg BID, digoxin 125 mcg and Lopressor 50 mg BID for HR control  Continue telemetry monitoring/serial EKGs PRN  Continue IV heparin gtt until his workup is complete; switch to [facility] prior to discharge when able given s/p DCCV  [id number] score is 1 (chf)  Euvolemic on exam today; continue PO Lasix 40mg QD  Avoid ACEi/ARB/ARNi given VASILE (creatinine 1 5 today)  Continue atorvastatin 10 mg QD  Recommend sleep study as outpatient    Diagnostics:  HS troponin trend: 23/32/25/73/65  NTproBNP: 2,533     TTE 06/22/2022:    Left Ventricle: Left ventricular cavity size is dilated  Wall thickness is normal  Systolic function is moderately reduced (35%)  There is moderate global hypokinesis with regional variation (hypokinesis of anterior wall)  Diastolic function is normal     Right Ventricle: Right ventricular cavity size is upper normal  Systolic function is normal     Left Atrium: The atrium is mildly dilated    Right Atrium: The atrium is mildly dilated    Aortic Valve: There is trace regurgitation    Mitral Valve: There is moderate to severe regurgitation    Tricuspid Valve: There is mild to moderate regurgitation  The right ventricular systolic pressure is mildly elevated (53 mm Hg)    Pulmonic Valve: There is trace regurgitation    IVC/SVC: The inferior vena cava is dilated   Respirophasic changes were blunted (less than 50% variation)      TTE 06/23/2022:    Left Ventricle: Left ventricular cavity size is dilated  Wall thickness is normal  Systolic function is moderately reduced - 35%  There is moderate global hypokinesis with regional variation  There was spontaneous echo contrast (smoke) seen in the LV    Left Atrium: The atrium is mildly dilated  There is no thrombus  There is mild, continuous spontaneous echo contrast     Right Atrium: The atrium is mildly dilated    Atrial Septum: No patent foramen ovale confirmed at rest by color flow Doppler    Left Atrial Appendage: There is normal function  There is no thrombus    Mitral Valve: There is moderate regurgitation    Tricuspid Valve: There is mild regurgitation  Subjective:   Patient seen and examined  Overnight events reviewed  No symptoms currently  Objective:   Vitals: Blood pressure 116/82, pulse 63, temperature 97 8 °F (36 6 °C), resp  rate 18, height 6' 2" (1 88 m), weight 123 kg (271 lb 6 2 oz), SpO2 95 %  , Body mass index is 34 84 kg/m² ,   Orthostatic Blood Pressures    Flowsheet Row Most Recent Value   Blood Pressure 116/82 filed at 06/27/2022 8512   Patient Position - Orthostatic VS Lying filed at 06/24/2022 1100          Intake/Output Summary (Last 24 hours) at 6/27/2022 1460  Last data filed at 6/27/2022 6762  Gross per 24 hour   Intake 1382 4 ml   Output 1050 ml   Net 332 4 ml     Physical Exam:  GEN: Masha Arm appears well, alert and oriented x 3, pleasant and cooperative   HEENT: Sclera anicteric, conjunctivae pink, mucous membranes moist  Oropharynx clear  NECK: supple, no significant JVD, Trachea midline, no thyromegaly  HEART: irregular rhythm, no murmurs, clicks, gallops or rubs   LUNGS: clear to auscultation bilaterally; no wheezes, rales, or rhonchi  No increased work of breathing or signs of respiratory distress  ABDOMEN: Soft, nontender, nondistended  EXTREMITIES: Skin warm and well perfused, no clubbing, cyanosis, or edema  NEURO: No focal findings  Normal speech   Mood and affect normal    SKIN: Normal without suspicious lesions on exposed skin      Medications:    Current Facility-Administered Medications:     acetaminophen (TYLENOL) tablet 975 mg, 975 mg, Oral, Q8H PRN, Nomi Right, HUMPHREYNP    amiodarone tablet 400 mg, 400 mg, Oral, BID With Meals, Nomi Right, CRNP, 400 mg at 06/27/22 7188    atorvastatin (LIPITOR) tablet 10 mg, 10 mg, Oral, Daily With Dinner, Nomi Arellano CRNP, 10 mg at 06/26/22 1608    cefpodoxime (VANTIN) tablet 400 mg, 400 mg, Oral, BID With Meals, Milena Ibrahim, DO, 400 mg at 06/27/22 0844    digoxin (LANOXIN) tablet 125 mcg, 125 mcg, Oral, Daily, Nomi Arellano, CRNP, 125 mcg at 05/73/23 3804    folic acid (FOLVITE) tablet 1 mg, 1 mg, Oral, Daily, Nomi Right, CRNP, 1 mg at 06/26/22 0936    furosemide (LASIX) tablet 40 mg, 40 mg, Oral, Daily, Chuckie Castellanos PA-C, 40 mg at 06/27/22 0837    heparin (porcine) 25,000 units in D5W 250 mL infusion (premix), 3-20 Units/kg/hr (Order-Specific), Intravenous, Titrated, JEROD Mccormick, Last Rate: 22 6 mL/hr at 06/27/22 0639, 25 1 Units/kg/hr at 06/27/22 0639    heparin (porcine) injection 2,000 Units, 2,000 Units, Intravenous, Q1H PRN, Nomi Arellano CRNP, 2,000 Units at 06/24/22 2149    heparin (porcine) injection 4,000 Units, 4,000 Units, Intravenous, Q1H PRN, Nomi Right, CRNP, 4,000 Units at 06/23/22 0857    insulin lispro (HumaLOG) 100 units/mL subcutaneous injection 1-5 Units, 1-5 Units, Subcutaneous, TID AC, 1 Units at 06/23/22 0857 **AND** Fingerstick Glucose (POCT), , , TID AC, JEROD Mccormick    insulin lispro (HumaLOG) 100 units/mL subcutaneous injection 1-5 Units, 1-5 Units, Subcutaneous, HS, Karole Right, CRNP    metoprolol (LOPRESSOR) injection 5 mg, 5 mg, Intravenous, Q6H PRN, Karole Right, CRNP, 5 mg at 06/23/22 0857    metoprolol tartrate (LOPRESSOR) tablet 50 mg, 50 mg, Oral, Q12H [facility], Yenni Thakur MD, 50 mg at 06/27/22 0835    multivitamin-minerals (CENTRUM) tablet 1 tablet, 1 tablet, Oral, Daily, JEROD Flynn, 1 tablet at 06/26/22 0935    potassium chloride (K-DUR,KLOR-CON) CR tablet 40 mEq, 40 mEq, Oral, Daily, Vivienne Cheek, DO, 40 mEq at 06/27/22 0834    [COMPLETED] sodium chloride 0 9 % bolus 186 mL, 1 5 mL/kg, Intravenous, Once, Last Rate: 93 mL/hr at 06/27/22 0353, 186 mL at 06/27/22 0353 **FOLLOWED BY** sodium chloride 0 9 % infusion, 50 mL/hr, Intravenous, Continuous, JEROD Cook, Last Rate: 50 mL/hr at 06/27/22 0555, 50 mL/hr at 06/27/22 0555    thiamine tablet 100 mg, 100 mg, Oral, Daily, JEROD Flynn, 100 mg at 06/26/22 0936     Labs & Results:      Results from last 7 days   Lab Units 06/27/22  0559 06/25/22  0356 06/24/22  0446   WBC Thousand/uL 7 84 8 00 9 69   HEMOGLOBIN g/dL 11 8* 12 3 11 4*   HEMATOCRIT % 36 6 36 9 35 2*   PLATELETS Thousands/uL 225 190 169     Results from last 7 days   Lab Units 06/23/22  0526   TRIGLYCERIDES mg/dL 77   HDL mg/dL 34*     Results from last 7 days   Lab Units 06/27/22  0559 06/26/22  0653 06/25/22  0356 06/24/22  0446 06/23/22  1835 06/23/22  0526 06/22/22  1328 06/22/22  0540   POTASSIUM mmol/L 3 9 3 9 3 4* 3 6   < > 4 0   < > 4 7   CHLORIDE mmol/L 105 106 104 103   < > 104   < > 105   CO2 mmol/L 26 24 27 28   < > 21   < > 15*   BUN mg/dL 18 21 23 20   < > 21   < > 21   CREATININE mg/dL 1 56* 1 49* 1 46* 1 55*   < > 1 58*   < > 1 87*   CALCIUM mg/dL 9 0 9 2 8 8 8 7   < > 8 5   < > 8 6   ALK PHOS U/L  --   --   --  65  --  69  --  70   ALT U/L  --   --   --  105*  --  133*  --  72   AST U/L  --   --   --  40  --  51*  --  36    < > = values in this interval not displayed  Results from last 7 days   Lab Units 06/27/22  0559 06/26/22 2000 06/26/22  1407 06/22/22  0138 06/21/22  2101   INR  1 13  --   --   --  1 07   PTT seconds 56* 69* 69*   < >  --     < > = values in this interval not displayed       Results from last 7 days   Lab Units 06/25/22  0356 06/24/22  0446 06/23/22  0526   MAGNESIUM mg/dL 2 3 2 3 2 5

## 2022-06-27 NOTE — ASSESSMENT & PLAN NOTE
· Status post cardiac catheterization revealing 2 vessel disease in LAD and RCA  · Planned for transfer to [facility] for therapeutic cardiac catheterization using atherectomy given severe calcification in LAD  · Status post loading dose of Plavix  · Continue on Plavix, aspirin, Lipitor, beta-blocker  · On heparin drip  · Patient is chest pain-free

## 2022-06-28 ENCOUNTER — HOSPITAL ENCOUNTER (INPATIENT)
Facility: HOSPITAL | Age: 52
LOS: 3 days | Discharge: HOME | DRG: 246 | End: 2022-07-01
Attending: INTERNAL MEDICINE | Admitting: INTERNAL MEDICINE
Payer: COMMERCIAL

## 2022-06-28 VITALS
SYSTOLIC BLOOD PRESSURE: 98 MMHG | OXYGEN SATURATION: 95 % | WEIGHT: 272.27 LBS | TEMPERATURE: 97.6 F | HEIGHT: 74 IN | HEART RATE: 103 BPM | DIASTOLIC BLOOD PRESSURE: 62 MMHG | BODY MASS INDEX: 34.94 KG/M2 | RESPIRATION RATE: 16 BRPM

## 2022-06-28 DIAGNOSIS — I25.10 CORONARY ARTERY DISEASE: Primary | ICD-10-CM

## 2022-06-28 DIAGNOSIS — I48.91 ATRIAL FIBRILLATION, UNSPECIFIED TYPE (HCC): ICD-10-CM

## 2022-06-28 LAB
ANION GAP SERPL CALCULATED.3IONS-SCNC: 12 MMOL/L (ref 4–13)
APTT PPP: 65 SECONDS (ref 23–37)
BUN SERPL-MCNC: 16 MG/DL (ref 5–25)
CALCIUM SERPL-MCNC: 8.6 MG/DL (ref 8.3–10.1)
CHLORIDE SERPL-SCNC: 106 MMOL/L (ref 100–108)
CO2 SERPL-SCNC: 24 MMOL/L (ref 21–32)
CREAT SERPL-MCNC: 1.46 MG/DL (ref 0.6–1.3)
CREAT UR-MCNC: 64.7 MG/DL
ERYTHROCYTE [DISTWIDTH] IN BLOOD BY AUTOMATED COUNT: 13.4 % (ref 11.6–15.1)
GFR SERPL CREATININE-BSD FRML MDRD: 54 ML/MIN/1.73SQ M
GLUCOSE SERPL-MCNC: 105 MG/DL (ref 65–140)
GLUCOSE SERPL-MCNC: 107 MG/DL (ref 65–140)
GLUCOSE SERPL-MCNC: 108 MG/DL (ref 65–140)
GLUCOSE SERPL-MCNC: 119 MG/DL (ref 65–140)
GLUCOSE SERPL-MCNC: 126 MG/DL (ref 65–140)
GLUCOSE SERPL-MCNC: 126 MG/DL (ref 65–140)
HCT VFR BLD AUTO: 37 % (ref 36.5–49.3)
HGB BLD-MCNC: 11.8 G/DL (ref 12–17)
MCH RBC QN AUTO: 29.9 PG (ref 26.8–34.3)
MCHC RBC AUTO-ENTMCNC: 31.9 G/DL (ref 31.4–37.4)
MCV RBC AUTO: 94 FL (ref 82–98)
OSMOLALITY UR: 427 MMOL/KG
PLATELET # BLD AUTO: 212 THOUSANDS/UL (ref 149–390)
PMV BLD AUTO: 12.6 FL (ref 8.9–12.7)
POTASSIUM SERPL-SCNC: 4.2 MMOL/L (ref 3.5–5.3)
RBC # BLD AUTO: 3.94 MILLION/UL (ref 3.88–5.62)
SODIUM 24H UR-SCNC: 90 MOL/L
SODIUM SERPL-SCNC: 142 MMOL/L (ref 136–145)
UUN 24H UR-MCNC: 465 MG/DL
WBC # BLD AUTO: 7.98 THOUSAND/UL (ref 4.31–10.16)

## 2022-06-28 PROCEDURE — 82948 REAGENT STRIP/BLOOD GLUCOSE: CPT

## 2022-06-28 PROCEDURE — 93005 ELECTROCARDIOGRAM TRACING: CPT

## 2022-06-28 PROCEDURE — 85730 THROMBOPLASTIN TIME PARTIAL: CPT | Performed by: INTERNAL MEDICINE

## 2022-06-28 PROCEDURE — 99232 SBSQ HOSP IP/OBS MODERATE 35: CPT | Performed by: INTERNAL MEDICINE

## 2022-06-28 PROCEDURE — 80048 BASIC METABOLIC PNL TOTAL CA: CPT | Performed by: INTERNAL MEDICINE

## 2022-06-28 PROCEDURE — 85027 COMPLETE CBC AUTOMATED: CPT | Performed by: INTERNAL MEDICINE

## 2022-06-28 PROCEDURE — 99238 HOSP IP/OBS DSCHRG MGMT 30/<: CPT | Performed by: INTERNAL MEDICINE

## 2022-06-28 RX ORDER — DIPHENHYDRAMINE HYDROCHLORIDE, ZINC ACETATE 2; .1 G/100G; G/100G
CREAM TOPICAL 3 TIMES DAILY PRN
Status: DISCONTINUED | OUTPATIENT
Start: 2022-06-28 | End: 2022-07-01 | Stop reason: HOSPADM

## 2022-06-28 RX ORDER — HEPARIN SODIUM 1000 [USP'U]/ML
4000 INJECTION, SOLUTION INTRAVENOUS; SUBCUTANEOUS
Status: DISCONTINUED | OUTPATIENT
Start: 2022-06-28 | End: 2022-06-28 | Stop reason: SDUPTHER

## 2022-06-28 RX ORDER — INSULIN LISPRO 100 [IU]/ML
1-5 INJECTION, SOLUTION INTRAVENOUS; SUBCUTANEOUS
Status: DISCONTINUED | OUTPATIENT
Start: 2022-06-29 | End: 2022-07-01 | Stop reason: HOSPADM

## 2022-06-28 RX ORDER — FOLIC ACID 1 MG/1
1 TABLET ORAL DAILY
Status: DISCONTINUED | OUTPATIENT
Start: 2022-06-29 | End: 2022-07-01 | Stop reason: HOSPADM

## 2022-06-28 RX ORDER — METOPROLOL TARTRATE 50 MG/1
50 TABLET, FILM COATED ORAL EVERY 12 HOURS SCHEDULED
Status: CANCELLED | OUTPATIENT
Start: 2022-06-28 | End: 2022-06-29

## 2022-06-28 RX ORDER — HEPARIN SODIUM 1000 [USP'U]/ML
2000 INJECTION, SOLUTION INTRAVENOUS; SUBCUTANEOUS
Status: DISCONTINUED | OUTPATIENT
Start: 2022-06-28 | End: 2022-06-29

## 2022-06-28 RX ORDER — ACETAMINOPHEN 325 MG/1
975 TABLET ORAL EVERY 8 HOURS PRN
Status: DISCONTINUED | OUTPATIENT
Start: 2022-06-28 | End: 2022-07-01 | Stop reason: HOSPADM

## 2022-06-28 RX ORDER — CEFPODOXIME PROXETIL 200 MG/1
200 TABLET, FILM COATED ORAL 2 TIMES DAILY WITH MEALS
Status: COMPLETED | OUTPATIENT
Start: 2022-06-29 | End: 2022-06-29

## 2022-06-28 RX ORDER — ATORVASTATIN CALCIUM 40 MG/1
40 TABLET, FILM COATED ORAL
Status: DISCONTINUED | OUTPATIENT
Start: 2022-06-29 | End: 2022-07-01 | Stop reason: HOSPADM

## 2022-06-28 RX ORDER — ASPIRIN 81 MG/1
81 TABLET ORAL DAILY
Status: DISCONTINUED | OUTPATIENT
Start: 2022-06-29 | End: 2022-07-01 | Stop reason: HOSPADM

## 2022-06-28 RX ORDER — HEPARIN SODIUM 10000 [USP'U]/100ML
3-20 INJECTION, SOLUTION INTRAVENOUS
Status: DISCONTINUED | OUTPATIENT
Start: 2022-06-28 | End: 2022-06-29

## 2022-06-28 RX ORDER — POTASSIUM CHLORIDE 20 MEQ/1
40 TABLET, EXTENDED RELEASE ORAL DAILY
Status: DISCONTINUED | OUTPATIENT
Start: 2022-06-29 | End: 2022-07-01 | Stop reason: HOSPADM

## 2022-06-28 RX ORDER — HEPARIN SODIUM 1000 [USP'U]/ML
4000 INJECTION, SOLUTION INTRAVENOUS; SUBCUTANEOUS
Status: DISCONTINUED | OUTPATIENT
Start: 2022-06-28 | End: 2022-06-29

## 2022-06-28 RX ORDER — INSULIN LISPRO 100 [IU]/ML
1-5 INJECTION, SOLUTION INTRAVENOUS; SUBCUTANEOUS
Status: DISCONTINUED | OUTPATIENT
Start: 2022-06-28 | End: 2022-07-01 | Stop reason: HOSPADM

## 2022-06-28 RX ORDER — LANOLIN ALCOHOL/MO/W.PET/CERES
100 CREAM (GRAM) TOPICAL DAILY
Status: DISCONTINUED | OUTPATIENT
Start: 2022-06-29 | End: 2022-07-01 | Stop reason: HOSPADM

## 2022-06-28 RX ORDER — SODIUM CHLORIDE 9 MG/ML
75 INJECTION, SOLUTION INTRAVENOUS CONTINUOUS
Status: DISCONTINUED | OUTPATIENT
Start: 2022-06-28 | End: 2022-06-29

## 2022-06-28 RX ORDER — HEPARIN SODIUM 1000 [USP'U]/ML
2000 INJECTION, SOLUTION INTRAVENOUS; SUBCUTANEOUS
Status: DISCONTINUED | OUTPATIENT
Start: 2022-06-28 | End: 2022-06-28 | Stop reason: SDUPTHER

## 2022-06-28 RX ORDER — METOPROLOL TARTRATE 5 MG/5ML
5 INJECTION INTRAVENOUS EVERY 6 HOURS PRN
Status: DISCONTINUED | OUTPATIENT
Start: 2022-06-28 | End: 2022-07-01 | Stop reason: HOSPADM

## 2022-06-28 RX ORDER — HEPARIN SODIUM 10000 [USP'U]/100ML
3-20 INJECTION, SOLUTION INTRAVENOUS
Status: DISCONTINUED | OUTPATIENT
Start: 2022-06-28 | End: 2022-06-28 | Stop reason: SDUPTHER

## 2022-06-28 RX ORDER — CLOPIDOGREL BISULFATE 75 MG/1
75 TABLET ORAL DAILY
Status: DISCONTINUED | OUTPATIENT
Start: 2022-06-29 | End: 2022-07-01 | Stop reason: HOSPADM

## 2022-06-28 RX ORDER — FUROSEMIDE 40 MG/1
40 TABLET ORAL DAILY
Status: DISCONTINUED | OUTPATIENT
Start: 2022-06-29 | End: 2022-07-01

## 2022-06-28 RX ORDER — ONDANSETRON 2 MG/ML
4 INJECTION INTRAMUSCULAR; INTRAVENOUS EVERY 6 HOURS PRN
Status: DISCONTINUED | OUTPATIENT
Start: 2022-06-28 | End: 2022-07-01 | Stop reason: HOSPADM

## 2022-06-28 RX ORDER — METOPROLOL TARTRATE 50 MG/1
50 TABLET, FILM COATED ORAL EVERY 12 HOURS SCHEDULED
Status: DISCONTINUED | OUTPATIENT
Start: 2022-06-28 | End: 2022-06-28

## 2022-06-28 RX ADMIN — THIAMINE HCL TAB 100 MG 100 MG: 100 TAB at 08:02

## 2022-06-28 RX ADMIN — METOPROLOL TARTRATE 50 MG: 50 TABLET, FILM COATED ORAL at 08:02

## 2022-06-28 RX ADMIN — SODIUM CHLORIDE 75 ML/HR: 0.9 INJECTION, SOLUTION INTRAVENOUS at 22:04

## 2022-06-28 RX ADMIN — Medication 1 TABLET: at 08:03

## 2022-06-28 RX ADMIN — HEPARIN SODIUM 25.1 UNITS/KG/HR: 10000 INJECTION, SOLUTION INTRAVENOUS at 22:04

## 2022-06-28 RX ADMIN — CEFPODOXIME PROXETIL 200 MG: 200 TABLET, FILM COATED ORAL at 15:58

## 2022-06-28 RX ADMIN — HEPARIN SODIUM AND DEXTROSE 25.1 UNITS/KG/HR: 10000; 5 INJECTION INTRAVENOUS at 04:45

## 2022-06-28 RX ADMIN — CEFPODOXIME PROXETIL 200 MG: 200 TABLET, FILM COATED ORAL at 08:07

## 2022-06-28 RX ADMIN — DIGOXIN 125 MCG: 125 TABLET ORAL at 08:03

## 2022-06-28 RX ADMIN — ASPIRIN 81 MG: 81 TABLET, COATED ORAL at 08:04

## 2022-06-28 RX ADMIN — FUROSEMIDE 40 MG: 40 TABLET ORAL at 08:03

## 2022-06-28 RX ADMIN — POTASSIUM CHLORIDE 40 MEQ: 1500 TABLET, EXTENDED RELEASE ORAL at 08:01

## 2022-06-28 RX ADMIN — SODIUM CHLORIDE 75 ML/HR: 0.9 INJECTION, SOLUTION INTRAVENOUS at 09:38

## 2022-06-28 RX ADMIN — AMIODARONE HYDROCHLORIDE 400 MG: 200 TABLET ORAL at 08:03

## 2022-06-28 RX ADMIN — ATORVASTATIN CALCIUM 40 MG: 40 TABLET, FILM COATED ORAL at 15:57

## 2022-06-28 RX ADMIN — FOLIC ACID 1 MG: 1 TABLET ORAL at 08:02

## 2022-06-28 RX ADMIN — HEPARIN SODIUM AND DEXTROSE 25.1 UNITS/KG/HR: 10000; 5 INJECTION INTRAVENOUS at 16:39

## 2022-06-28 RX ADMIN — CLOPIDOGREL BISULFATE 75 MG: 75 TABLET ORAL at 08:03

## 2022-06-28 NOTE — ASSESSMENT & PLAN NOTE
Wt Readings from Last 3 Encounters:   06/27/22 123 kg (271 lb 6 2 oz)   11/16/21 124 kg (274 lb)   09/11/20 127 kg (279 lb)     · Likely brought on by afib  · MIKO 6/23: EF 35% with dilated LV and moderate global hypokinesis, moderate MR and mild TR  · Cath as noted above  · Appears to be euvolemic, continue lasix 40mg PO daily  · Monitor volume status with IVF

## 2022-06-28 NOTE — Clinical Note
Post procedure Ginger Jones is fully awake alert cooperative and talkative without complaints  BAM RÍOS without complaints  Spoke with Jessica Madison MD and findings discussed    Readied for transfer

## 2022-06-28 NOTE — ASSESSMENT & PLAN NOTE
· S/p CTA chest  · S/p IV ctx x 4 days; azithromycin x 1 day  · De-escalated to po vantin, last dose on 6/28  · Neg blood cx

## 2022-06-28 NOTE — ASSESSMENT & PLAN NOTE
· S/p cardiac cath 6/27 with proximal LAD 90% severely calcified stenosis (unable to advance IVUS through lesion), mid circumflex discrete 30% stenosis and mid RCA discrete 755-80% stenosis  · Transferred to B for revascularization of prox LAD with atherectomy  · Pt received loading dose of Plavix  · Continue Plavix, asa, Lipitor and metoprolol  · Continue heparin drip

## 2022-06-28 NOTE — ASSESSMENT & PLAN NOTE
Wt Readings from Last 3 Encounters:   06/28/22 124 kg (272 lb 4 3 oz)   11/16/21 124 kg (274 lb)   09/11/20 127 kg (279 lb)   · Likely precipitated by afib  · S/p MIKO revealing ef of 35% with hypokinesis  · Status post cardiac catheterization revealing 2 vessel disease in mid LAD, RCA  · On lasix PO 40mg daily  · Euvolemic  · Cont to monitor fluid status as on gentle fluid hydration  · On bb, no ace/arb secondary to VASILE

## 2022-06-28 NOTE — ASSESSMENT & PLAN NOTE
· New onset afib with RVR s/p failed DCCV on 6/23, now on amiodarone  · Continue lopressor for rate control  · Cardiology following, planned to reattempt cardioversion vs ablation vs EP eval pending cath results  · Continue IV heparin drip with eventual transition to [address]

## 2022-06-28 NOTE — ASSESSMENT & PLAN NOTE
· Likely secondary to hypoperfusion due to acute CHF, afib with rvr  · S/p renal US, no hydronephrosis  · Nephrology consulted for optimization of renal function given catheterization  · Avoid nephrotoxins and renally dose meds  · On gentle IVF hydration

## 2022-06-28 NOTE — ASSESSMENT & PLAN NOTE
· No signs of w/d  · CIWA no longer needed as out of window for withdrawal  · Cont thiamine/folic acid/MVI  · Educated on cessation

## 2022-06-28 NOTE — Clinical Note
Damaso Thomson is resting and comfortable with sedation administered  Respirations snoring in nature  Without complaints, tolerating procedure well

## 2022-06-28 NOTE — PLAN OF CARE
Problem: PAIN - ADULT  Goal: Verbalizes/displays adequate comfort level or baseline comfort level  Description: Interventions:  - Encourage patient to monitor pain and request assistance  - Assess pain using appropriate pain scale  - Administer analgesics based on type and severity of pain and evaluate response  - Implement non-pharmacological measures as appropriate and evaluate response  - Consider cultural and social influences on pain and pain management  - Notify physician/advanced practitioner if interventions unsuccessful or patient reports new pain  Outcome: Progressing     Problem: INFECTION - ADULT  Goal: Absence or prevention of progression during hospitalization  Description: INTERVENTIONS:  - Assess and monitor for signs and symptoms of infection  - Monitor lab/diagnostic results  - Monitor all insertion sites, i e  indwelling lines, tubes, and drains  - Monitor endotracheal if appropriate and nasal secretions for changes in amount and color  - [place] appropriate cooling/warming therapies per order  - Administer medications as ordered  - Instruct and encourage patient and family to use good hand hygiene technique  - Identify and instruct in appropriate isolation precautions for identified infection/condition  Outcome: Progressing  Goal: Absence of fever/infection during neutropenic period  Description: INTERVENTIONS:  - Monitor WBC    Outcome: Progressing

## 2022-06-28 NOTE — ASSESSMENT & PLAN NOTE
· Likely due to hypoperfusion due to afib RVR, acite CHF  · Nephrology consulted, optimized for cardiac cath  · Avoid nephrotoxins, hypotension  · Continue IVF gentle hydration

## 2022-06-28 NOTE — PROGRESS NOTES
Cardiology Progress Note   Lauryn Huber 46 y o  male MRN: [id number]    Unit/Bed#: -01 Encounter: [id number]      Assessment:  1  Acute systolic CHF   2  Likely ischemic cardiomyopathy EF 35%  3  Multivessel CAD (heavily calcified 90% LM, 90% pLAD, 75-80% mRCA, 30% mLCx stenosis)  4  New onset atrial fibrillation with RVR (s/p unsuccessful DCCV x3; 6/23/2022)  5  Pneumonia  6  VASILE  7  Hyperlipidemia    Plan:  Cardiac catheterization yesterday revealed severe multivessel CAD; she will need inpatient transfer to [facility] for PCI with atherectomy  Case discussed with Dr Teagan Castillo who evaluate patient on transfer  Discussed with [facility] who are working on transfer based on bed availability  Continue ASA 81mg and atorvastatin 40 mg daily  He is s/p Plavix load (300mg x1) yesterday; start Plavix 75 mg from today given severe CAD  Patient's cardiomyopathy more likely ischemic etiology; will discontinue amiodarone 400 mg BID  Continue Lopressor 50 mg BID and digoxin 125mcg QD  Consider switch to Toprol-XL prior to discharge and adding ACEi/ARB/ARNi and Aldactone for CMP  Consider LifeVest prior to discharge given the EF <35%  Patient is currently euvolemic on PO Lasix 40 mg daily  Continue to follow creatinine trend/serum lytes given VASILE  Salt restrictions, daily weights, strict I&Os  Continue telemetry monitoring/serial EKGs PRN    Diagnostics:  Cardiac catheterization 06/27/2022:  · Mid LM lesion is 90% stenosed  · Proximal LAD 90% severely calcified stenosis (unable to advanced IVUS through lesion)  · Mid circumflex discrete 30% stenosis  · Mid RCA discrete 75-80% stenosis    TTE 06/22/2022:    Left Ventricle: Left ventricular cavity size is dilated  Wall thickness is normal  Systolic function is moderately reduced (35%)  There is moderate global hypokinesis with regional variation (hypokinesis of anterior wall)   Diastolic function is normal     Right Ventricle: Right ventricular cavity size is upper normal  Systolic function is normal     Left Atrium: The atrium is mildly dilated    Right Atrium: The atrium is mildly dilated    Aortic Valve: There is trace regurgitation    Mitral Valve: There is moderate to severe regurgitation    Tricuspid Valve: There is mild to moderate regurgitation  The right ventricular systolic pressure is mildly elevated (53 mm Hg)    Pulmonic Valve: There is trace regurgitation    IVC/SVC: The inferior vena cava is dilated  Respirophasic changes were blunted (less than 50% variation)  Subjective:   Patient seen and examined  Overnight events reviewed  Patient denies any acute complaints at this time  Objective:     Vitals: Blood pressure 120/84, pulse (!) 112, temperature 97 6 °F (36 4 °C), temperature source Axillary, resp  rate 17, height 6' 2" (1 88 m), weight 124 kg (272 lb 4 3 oz), SpO2 93 %  , Body mass index is 34 96 kg/m² ,   Orthostatic Blood Pressures    Flowsheet Row Most Recent Value   Blood Pressure 120/84 filed at 06/28/2022 1237   Patient Position - Orthostatic VS Sitting filed at 06/28/2022 5552            Intake/Output Summary (Last 24 hours) at 6/28/2022 1139  Last data filed at 6/28/2022 1138  Gross per 24 hour   Intake 3051 81 ml   Output 5 ml   Net 3046 81 ml     Physical Exam:  GEN: Sahnnon Thomason appears well, alert and oriented x 3, pleasant and cooperative   HEENT: Sclera anicteric, conjunctivae pink, mucous membranes moist  Oropharynx clear  NECK: supple, no significant JVD, Trachea midline, no thyromegaly  HEART: irregular rhythm, no murmurs, clicks, gallops or rubs   LUNGS: clear to auscultation bilaterally; no wheezes, rales, or rhonchi  No increased work of breathing or signs of respiratory distress  ABDOMEN: Soft, nontender, nondistended  EXTREMITIES: +1 LE edema  Skin warm and well perfused, no clubbing, cyanosis  NEURO: No focal findings  Normal speech   Mood and affect normal    SKIN: Normal without suspicious lesions on exposed skin        Medications:      Current Facility-Administered Medications:     acetaminophen (TYLENOL) tablet 975 mg, 975 mg, Oral, Q8H PRN, JEROD Cordova    amiodarone tablet 400 mg, 400 mg, Oral, BID With Meals, JEROD Cordova, 400 mg at 06/28/22 0803    aspirin (ECOTRIN LOW STRENGTH) EC tablet 81 mg, 81 mg, Oral, Daily, Chuckie Castellanos PA-C, 81 mg at 06/28/22 0804    atorvastatin (LIPITOR) tablet 40 mg, 40 mg, Oral, Daily With Roberta Billings DO    cefpodoxime Kika Mercy) tablet 200 mg, 200 mg, Oral, BID With Meals, Jearlean Blizzard, DO, 200 mg at 06/28/22 0807    clopidogrel (PLAVIX) tablet 75 mg, 75 mg, Oral, Daily, Chuckie Castellanos PA-C, 75 mg at 06/28/22 0803    digoxin (LANOXIN) tablet 125 mcg, 125 mcg, Oral, Daily, JEROD Cordova, 125 mcg at 06/28/22 0803    diphenhydrAMINE-zinc acetate (BENADRYL) 2-0 1 % cream, , Topical, TID PRN, Jearlean Blizzard, DO    folic acid (FOLVITE) tablet 1 mg, 1 mg, Oral, Daily, JEROD Cordova, 1 mg at 06/28/22 0802    furosemide (LASIX) tablet 40 mg, 40 mg, Oral, Daily, Chuckie Castellanos PA-C, 40 mg at 06/28/22 0803    heparin (porcine) 25,000 units in D5W 250 mL infusion (premix), 3-20 Units/kg/hr (Order-Specific), Intravenous, Titrated, HUMPHREY CordovaNP, Last Rate: 22 6 mL/hr at 06/28/22 0445, 25 1 Units/kg/hr at 06/28/22 0445    heparin (porcine) injection 2,000 Units, 2,000 Units, Intravenous, Q1H PRN, Kojo Bakari, CRNP, 2,000 Units at 06/24/22 2149    heparin (porcine) injection 4,000 Units, 4,000 Units, Intravenous, Q1H PRN, Kojo Bakari, CRNP, 4,000 Units at 06/23/22 0857    insulin lispro (HumaLOG) 100 units/mL subcutaneous injection 1-5 Units, 1-5 Units, Subcutaneous, TID AC, 1 Units at 06/23/22 0857 **AND** Fingerstick Glucose (POCT), , , TID AC, JEROD Cordova    insulin lispro (HumaLOG) 100 units/mL subcutaneous injection 1-5 Units, 1-5 Units, Subcutaneous, , Brisa Roberts JEROD Olguin    metoprolol (LOPRESSOR) injection 5 mg, 5 mg, Intravenous, Q6H PRN, Nicole Arteaga CRNP, 5 mg at 06/23/22 0857    metoprolol tartrate (LOPRESSOR) tablet 50 mg, 50 mg, Oral, Q12H [address], Monik Chisholm MD, 50 mg at 06/28/22 0802    multivitamin-minerals (CENTRUM) tablet 1 tablet, 1 tablet, Oral, Daily, JEROD Garcia, 1 tablet at 06/28/22 0803    ondansetron (ZOFRAN) injection 4 mg, 4 mg, Intravenous, Q6H PRN, Jack Tanner MD    potassium chloride (K-DUR,KLOR-CON) CR tablet 40 mEq, 40 mEq, Oral, Daily, Anatoliye Marii, DO, 40 mEq at 06/28/22 0801    thiamine tablet 100 mg, 100 mg, Oral, Daily, HUMPHREY GarciaNP, 100 mg at 06/28/22 0802     Labs & Results:        Results from last 7 days   Lab Units 06/28/22  0545 06/27/22  0559 06/25/22  0356   WBC Thousand/uL 7 98 7 84 8 00   HEMOGLOBIN g/dL 11 8* 11 8* 12 3   HEMATOCRIT % 37 0 36 6 36 9   PLATELETS Thousands/uL 212 225 190     Results from last 7 days   Lab Units 06/23/22  0526   TRIGLYCERIDES mg/dL 77   HDL mg/dL 34*     Results from last 7 days   Lab Units 06/28/22  0545 06/27/22  0559 06/26/22  0653 06/25/22  0356 06/24/22  0446 06/23/22  1835 06/23/22  0526 06/22/22  1328 06/22/22  0540   POTASSIUM mmol/L 4 2 3 9 3 9   < > 3 6   < > 4 0   < > 4 7   CHLORIDE mmol/L 106 105 106   < > 103   < > 104   < > 105   CO2 mmol/L 24 26 24   < > 28   < > 21   < > 15*   BUN mg/dL 16 18 21   < > 20   < > 21   < > 21   CREATININE mg/dL 1 46* 1 56* 1 49*   < > 1 55*   < > 1 58*   < > 1 87*   CALCIUM mg/dL 8 6 9 0 9 2   < > 8 7   < > 8 5   < > 8 6   ALK PHOS U/L  --   --   --   --  65  --  69  --  70   ALT U/L  --   --   --   --  105*  --  133*  --  72   AST U/L  --   --   --   --  40  --  51*  --  36    < > = values in this interval not displayed       Results from last 7 days   Lab Units 06/28/22  0545 06/27/22  2226 06/27/22  0559 06/22/22  0138 06/21/22  2101   INR   --   --  1 13  --  1 07   PTT seconds 65* 82* 56*   < >  -- < > = values in this interval not displayed       Results from last 7 days   Lab Units 06/25/22  0356 06/24/22  0446 06/23/22  0526   MAGNESIUM mg/dL 2 3 2 3 2 5

## 2022-06-28 NOTE — TRANSPORTATION MEDICAL NECESSITY
Message    Nicola Howard is a patient under my care. She is free of communicable diseases. Signatures   Electronically signed by :  Yas Nguyễn L.P.N.; Feb 21 2017  4:40PM CST (Author) Section I - General Information    Name of Patient: Hira Brock                 : 1970    Medicare #: [id number]  Transport Date: 22 (PCS is valid for round trips on this date and for all repetitive trips in the 60-day range as noted below )  Origin: [facility]: [facility]  Is the pt's stay covered under Medicare Part A (PPS/DRG)   []     Closest appropriate facility? If no, why is transport to more distant facility required? Yes  If hospice pt, is this transport related to pt's terminal illness? No       Section II - Medical Necessity Questionnaire  Ambulance transportation is medically necessary only if other means of transport are contraindicated or would be potentially harmful to the patient  To meet this requirement, the patient must either be "bed confined" or suffer from a condition such that transport by means other than ambulance is contraindicated by the patient's condition  The following questions must be answered by the medical professional signing below for this form to be valid:    1)  Describe the MEDICAL CONDITION (physical and/or mental) of this patient AT [address] [place] [address] that requires the patient to be transported in an ambulance and why transport by other means is contraindicated by the patient's condition:Decreased strength, Decreased endurance, A-Fib, Acute HFrEF, Hypertension    2) Is the patient "bed confined" as defined below? No  To be "be confined" the patient must satisfy all three of the following conditions: (1) unable to get up from bed without Assistance; AND (2) unable to ambulate; AND (3) unable to sit in a chair or wheelchair  3) Can this patient safely be transported by car or wheelchair van (i e , seated during transport without a medical attendant or monitoring)?    No    4) In addition to completing questions 1-3 above, please check any of the following conditions that apply*:   *Note: supporting documentation for any boxes checked must be maintained in the patient's medical records  If hosp-hosp transfer, describe services needed at 2nd facility not available at 1st facility? Moderate/severe pain on movement   IV meds/fluids required   Medical attendant required   Cardiac monitoring required en route       Section III - Signature of Physician or Healthcare Professional  I certify that the above information is true and correct based on my evaluation of this patient, and represent that the patient requires transport by ambulance and that other forms of transport are contraindicated  I understand that this information will be used by the Centers for Medicare and Medicaid Services (CMS) to support the determination of medical necessity for ambulance services, and I represent that I have personal knowledge of the patient's condition at time of transport  []  If this box is checked, I also certify that the patient is physically or mentally incapable of signing the ambulance service's claim and that the institution with which I am affiliated has furnished care, services, or assistance to the patient  My signature below is made on behalf of the patient pursuant to 42 CFR §424 36(b)(4)  In accordance with 42 CFR §424 37, the specific reason(s) that the patient is physically or mentally incapable of signing the claim form is as follows: N/A      Signature of Physician* or Healthcare Professional______________________________________________________________  Signature Date 06/28/22 (For scheduled repetitive transports, this form is not valid for transports performed more than 60 days after this date)    Printed Name & Credentials of Physician or Healthcare Professional (MD, DO, RN, etc )_GRACE Kitchen Cera  *Form must be signed by patient's attending physician for scheduled, repetitive transports   For non-repetitive, unscheduled ambulance transports, if unable to obtain the signature of the attending physician, any of the following may sign (choose appropriate option below)  [] Physician Assistant []  Clinical Nurse Specialist []  Registered Nurse  []  Nurse Practitioner  [x] Discharge Planner

## 2022-06-28 NOTE — ASSESSMENT & PLAN NOTE
· New onset W/ RVR s/p failed cardioversion  · Remains in afib, HR better controlled  · Cardiology following; Planned for reattempt at cardioversion vs EP eval for ablation  · On digoxin, metoprolol, amiodarone  · A/c with heparin gtt  · nml TSH  S/p MIKO

## 2022-06-28 NOTE — H&P
[facility]  H&P- Aleksandar Yanez 1970, 46 y o  male MRN: [id number]  Unit/Bed#:  Encounter: [id number]  Primary Care Provider: Aaron Ross MD   Date and time admitted to hospital: No admission date for patient encounter      * Atrial fibrillation ([facility] [address] )  Assessment & Plan  · New onset afib with RVR s/p failed DCCV on 6/23, now on amiodarone  · Continue lopressor for rate control  · Cardiology following, planned to reattempt cardioversion vs ablation vs EP eval pending cath results  · Continue IV heparin drip with eventual transition to [address]      Coronary artery disease  Assessment & Plan  · S/p cardiac cath 6/27 with proximal LAD 90% severely calcified stenosis (unable to advance IVUS through lesion), mid circumflex discrete 30% stenosis and mid RCA discrete 755-80% stenosis  · Transferred to [facility] for revascularization of prox LAD with atherectomy  · Pt received loading dose of Plavix  · Continue Plavix, asa, Lipitor and metoprolol  · Continue heparin drip    Acute systolic heart failure (HCC)  Assessment & Plan  Wt Readings from Last 3 Encounters:   06/27/22 123 kg (271 lb 6 2 oz)   11/16/21 124 kg (274 lb)   09/11/20 127 kg (279 lb)     · Likely brought on by afib  · MIKO 6/23: EF 35% with dilated LV and moderate global hypokinesis, moderate MR and mild TR  · Cath as noted above  · Appears to be euvolemic, continue lasix 40mg PO daily  · Monitor volume status with IVF        PNA (pneumonia)  Assessment & Plan  · CTA chest 6/22: "patchy groundglass patchy airspace disease throughout RUL consistent with acute infiltrate"   · S/p 4 days of IV ceftriaxone and 1 day azithromycin, now on PO vantin with last dose 6/28    VASILE (acute kidney injury) ([facility] )  Assessment & Plan  · Likely due to hypoperfusion due to afib RVR, acite CHF  · Nephrology consulted, optimized for cardiac cath  · Avoid nephrotoxins, hypotension  · Continue IVF gentle hydration    Hypertension  Assessment & Plan  · BP WNL    Pre-diabetes  Assessment & Plan  · A1c 5 9  · SSI, hypoglycemia protocol    Contact dermatitis  Assessment & Plan  · Likely due to bed sheets, on regions of direct contact to the bed  · PRN benadryl    Alcohol abuse  Assessment & Plan  · CIWA dc'd, out of withdrawal window  · Continue thiamine, folic acid, multivitamin    VTE Pharmacologic Prophylaxis: VTE Score: 4 Moderate Risk (Score 3-4) - Pharmacological DVT Prophylaxis Ordered: heparin drip  Code Status: Level 1 - Full Code     Anticipated Length of Stay: Patient will be admitted on an inpatient basis with an anticipated length of stay of greater than 2 midnights secondary to cardiac cath, EP evaluation  Total Time for Visit, including Counseling / Coordination of Care: 45 minutes Greater than 50% of this total time spent on direct patient counseling and coordination of care  Chief Complaint: Shortness of breath    History of Present Illness:  Anastasia Reyez is a 46 y o  male with a PMH of HTN, smoking, HLD who presents tp [facility] with shortness of breath and palipations x4days  Pt found to be in afib with rvr at a rate of 176  BP was stable  ED started IV cardizem and heparin drip  Pt was then switched to amiodarone and had significant hypotension, was transferred to the ICU for pressors  He was evaluated by cardiology who planned for eventual cardioversion and cardiac cath  Cardioversion was unsuccessful, cath showed 90% calcified occlusion of proximal LAD  Pt transferred to [facility] for cardiac cath with arterectomy, potential cardioversion vs ablation vs EP eval     Review of Systems:  Review of Systems   Constitutional: Negative for chills and diaphoresis  Respiratory: Negative for choking, chest tightness and shortness of breath  Musculoskeletal: Negative for arthralgias and back pain  Neurological: Negative for dizziness and facial asymmetry  Psychiatric/Behavioral: Negative for agitation     All other systems reviewed and are negative  Past Medical and Surgical History:   Past Medical History:   Diagnosis Date    Chest pain 9/25/2014    Eczema     Hidradenitis suppurativa 2/14/2018    Rib pain 1/28/2015       No past surgical history on file  Meds/Allergies:  Prior to Admission medications    Medication Sig Start Date End Date Taking? Authorizing Provider   atorvastatin (LIPITOR) 10 mg tablet Take 1 tablet (10 mg total) by mouth daily  Patient not taking: Reported on 11/16/2021 8/11/20   Tom Gonzalez MD   halobetasol (ULTRAVATE) 0 05 % cream Apply topically once for 1 dose 9/23/19 9/23/19  Brigida Johnson DO   nicotine polacrilex (COMMIT) 4 MG lozenge Apply 1 lozenge (4 mg total) to the mouth or throat as needed for smoking cessation 11/16/21   Tom Gonzalez MD     I have reviewed home medications with patient personally  Allergies: No Known Allergies    Social History:  Marital Status: Single     Patient Pre-hospital Living Situation: Home  Patient Pre-hospital Level of Mobility: walks    Substance Use History:   Social History     Substance and Sexual Activity   Alcohol Use Yes    Comment: social; moderate     Social History     Tobacco Use   Smoking Status Former Smoker    Packs/day: 1 00    Years: 20 00    Pack years: 20 00   Smokeless Tobacco Former User     Social History     Substance and Sexual Activity   Drug Use No       Family History:  Family History   Problem Relation Age of Onset    Mental illness Mother         denied    Substance Abuse Mother         denied    Mental illness Father         denied    Substance Abuse Father         denied    Heart disease Father         cardiac disorder       Physical Exam:     Vitals:        Physical Exam  Constitutional:       Appearance: Normal appearance  HENT:      Head: Normocephalic and atraumatic  Cardiovascular:      Rate and Rhythm: Normal rate and regular rhythm     Pulmonary:      Effort: Pulmonary effort is normal       Breath sounds: Normal breath sounds  Abdominal:      General: Abdomen is flat  Palpations: Abdomen is soft  Neurological:      General: No focal deficit present  Mental Status: He is alert and oriented to person, place, and time  Psychiatric:         Mood and Affect: Mood normal          Behavior: Behavior normal           Additional Data:     Lab Results:  Results from last 7 days   Lab Units 06/27/22  0559   WBC Thousand/uL 7 84   HEMOGLOBIN g/dL 11 8*   HEMATOCRIT % 36 6   PLATELETS Thousands/uL 225   NEUTROS PCT % 62   LYMPHS PCT % 24   MONOS PCT % 9   EOS PCT % 3     Results from last 7 days   Lab Units 06/27/22  0559 06/25/22  0356 06/24/22  0446   SODIUM mmol/L 142   < > 141   POTASSIUM mmol/L 3 9   < > 3 6   CHLORIDE mmol/L 105   < > 103   CO2 mmol/L 26   < > 28   BUN mg/dL 18   < > 20   CREATININE mg/dL 1 56*   < > 1 55*   ANION GAP mmol/L 11   < > 10   CALCIUM mg/dL 9 0   < > 8 7   ALBUMIN g/dL  --   --  3 2*   TOTAL BILIRUBIN mg/dL  --   --  0 37   ALK PHOS U/L  --   --  65   ALT U/L  --   --  105*   AST U/L  --   --  40   GLUCOSE RANDOM mg/dL 107   < > 107    < > = values in this interval not displayed       Results from last 7 days   Lab Units 06/27/22  0559   INR  1 13     Results from last 7 days   Lab Units 06/27/22  2048 06/27/22  1615 06/27/22  0603 06/26/22  2056 06/26/22  1544 06/26/22  1052 06/26/22  0650 06/25/22  2058 06/25/22  1535 06/25/22  1109 06/25/22  0631 06/24/22 2056   POC GLUCOSE mg/dl 129 126 109 126 133 110 115 121 126 120 114 131     Results from last 7 days   Lab Units 06/22/22  0601   HEMOGLOBIN A1C % 5 9*     Results from last 7 days   Lab Units 06/24/22  0446 06/23/22  0526 06/22/22  1103 06/22/22  0807 06/22/22  0540 06/22/22  0221 06/21/22  2101   LACTIC ACID mmol/L  --   --  0 7 3 1* 4 8*  --  1 8   PROCALCITONIN ng/ml 0 24 0 28*  --   --   --  0 07  --        Imaging: Reviewed radiology reports from this admission including: procedure reports, ultrasound(s) and ECHO  No orders to display       EKG and Other Studies Reviewed on Admission:   · EKG: No EKG obtained  ** Please Note: This note has been constructed using a voice recognition system   **

## 2022-06-28 NOTE — ASSESSMENT & PLAN NOTE
· Likely from linens as noted on regions where has direct contact  · Prn benadryl  · Low suspicion for drug allergy  · No sign of anaphylaxis

## 2022-06-28 NOTE — DISCHARGE SUMMARY
[facility]  Discharge- Aura Person 1970, 46 y o  male MRN: [id number]  Unit/Bed#: -01 Encounter: [id number]  Primary Care Provider: Kourtney Andrews MD   Date and time admitted to hospital: 6/21/2022  8:39 PM    Coronary artery disease  Assessment & Plan  · Status post cardiac catheterization revealing 2 vessel disease in LAD and RCA  · Planned for transfer to [facility] for therapeutic cardiac catheterization using atherectomy given severe calcification in LAD  · Status post loading dose of Plavix  · Continue on Plavix, aspirin, Lipitor, beta-blocker  · On heparin drip  · Patient is chest pain-free    Acute systolic heart failure ([facility] [address] )  Assessment & Plan  Wt Readings from Last 3 Encounters:   06/28/22 124 kg (272 lb 4 3 oz)   11/16/21 124 kg (274 lb)   09/11/20 127 kg (279 lb)   · Likely precipitated by afib  · S/p MIKO revealing ef of 35% with hypokinesis  · Status post cardiac catheterization revealing 2 vessel disease in mid LAD, RCA  · On lasix PO 40mg daily  · Euvolemic  · Cont to monitor fluid status as on gentle fluid hydration  · On bb, no ace/arb secondary to VASILE      VASILE (acute kidney injury) ([facility] [address] )  Assessment & Plan  · Likely secondary to hypoperfusion due to acute CHF, afib with rvr  · S/p renal US, no hydronephrosis  · Nephrology consulted for optimization of renal function given catheterization  · Avoid nephrotoxins and renally dose meds  · On gentle IVF hydration    * Atrial fibrillation ([facility] [address] )  Assessment & Plan  · New onset W/ RVR s/p failed cardioversion  · Remains in afib, HR better controlled  · Cardiology following; Planned for reattempt at cardioversion vs EP eval for ablation  · On digoxin, metoprolol, amiodarone  · A/c with heparin gtt  · nml TSH  S/p MIKO    Pre-diabetes  Assessment & Plan  · Hemoglobin A1c of 5 9  · On insulin sliding scale  · Nutrition consult    Contact dermatitis  Assessment & Plan  · Likely from linens as noted on regions where has direct contact  · Prn benadryl  · Low suspicion for drug allergy  · No sign of anaphylaxis     Morbid obesity (HCC)  Assessment & Plan  Educated on lifestyle, dietary modification    PNA (pneumonia)  Assessment & Plan  · S/p CTA chest  · S/p IV ctx x 4 days; azithromycin x 1 day  · De-escalated to po vantin, last dose on 6/28  · Neg blood cx    Alcohol abuse  Assessment & Plan  · No signs of w/d  · CIWA no longer needed as out of window for withdrawal  · Cont thiamine/folic acid/MVI  · Educated on cessation    Hypertension  Assessment & Plan  Stable      Medical Problems             Resolved Problems  Date Reviewed: 6/27/2022   None               Discharging Physician / Practitioner: Larose Opitz, MD  PCP: Kimberli Guillaume MD  Admission Date:   Admission Orders (From admission, onward)     Ordered        06/21/22 2348  Inpatient Admission  Once                      Discharge Date: 06/28/22      Consultations During Hospital Stay:  · Cardiology  · Nephrology     Procedures Performed:   · MIKO  · Cardioversion  · Cardiac catheterization  · CT abdomen pelvis  · Renal ultrasound     Significant Findings / Test Results:   · See above     Incidental Findings:   ·       Test Results Pending at Discharge (will require follow up):   ·       Outpatient Tests Requested:  ·       Complications:  None     Reason for Admission:      Hospital Course:      HPI: Hany Rodriguez a 46 y  o  male who presented to ED in AF w/ RVR with rates in 170s  Sx of palpitations and SOB x4 d at home, chronically experiences AL, orthopnea and intermittent leg swelling at home  No prior echos, does not see a cardiologist, sees PCP occasionally  Father with hx of MI, AF, CHF  Given 1L of crystalloid and cardizem bolus and gtt in ED  Given 5mg lopressor IV and 40mg Lasix IV  D dimer in ED elevated at 1 56      Found to have low EF and taken for LHC with two vessel disease requiring potential artherectomy     Please see above list of diagnoses and related plan for additional information  Condition at Discharge: stable    Discharge Day Visit / Exam:   Subjective:  "I thought I was going to [facility] yesterday" he denies cp/sob, n/v, abd pain  He denies fever/chills  He has a good appetite  Vitals: Blood Pressure: 120/84 (06/28/22 0659)  Pulse: (!) 112 (06/28/22 0659)  Temperature: 97 6 °F (36 4 °C) (06/28/22 0659)  Temp Source: Axillary (06/28/22 0659)  Respirations: 17 (06/28/22 0659)  Height: 6' 2" (188 cm) (06/23/22 1620)  Weight - Scale: 124 kg (272 lb 4 3 oz) (06/28/22 0546)  SpO2: 93 % (06/28/22 0659)  Exam:   Physical Exam  Vitals and nursing note reviewed  Constitutional:       Appearance: Normal appearance  He is not ill-appearing or diaphoretic  HENT:      Head: Normocephalic  Nose: Nose normal  No congestion  Mouth/Throat:      Mouth: Mucous membranes are moist       Pharynx: No oropharyngeal exudate  Eyes:      General: No scleral icterus  Conjunctiva/sclera: Conjunctivae normal    Pulmonary:      Effort: Pulmonary effort is normal  No respiratory distress  Abdominal:      General: Bowel sounds are normal  There is no distension  Palpations: Abdomen is soft  Tenderness: There is no abdominal tenderness  Musculoskeletal:      Cervical back: Normal range of motion and neck supple  No rigidity  Skin:     General: Skin is warm  Coloration: Skin is not jaundiced  Neurological:      General: No focal deficit present  Mental Status: He is alert and oriented to person, place, and time  Psychiatric:         Mood and Affect: Mood normal          Behavior: Behavior normal           Discussion with Family: Patient declined call to   Discharge instructions/Information to patient and family:   See after visit summary for information provided to patient and family        Provisions for Follow-Up Care:  See after visit summary for information related to follow-up care and any pertinent home health orders  Disposition:   Other: [facility]    Planned Readmission: yes     Discharge Statement:  I spent 25 minutes discharging the patient  This time was spent on the day of discharge  I had direct contact with the patient on the day of discharge  Greater than 50% of the total time was spent examining patient, answering all patient questions, arranging and discussing plan of care with patient as well as directly providing post-discharge instructions  Additional time then spent on discharge activities  Discharge Medications:  See after visit summary for reconciled discharge medications provided to patient and/or family        **Please Note: This note may have been constructed using a voice recognition system**

## 2022-06-28 NOTE — ASSESSMENT & PLAN NOTE
· Status post cardiac catheterization revealing 2 vessel disease in LAD and RCA  · Planned for transfer to [facility] for therapeutic cardiac catheterization using atherectomy given severe calcification in LAD  · Status post loading dose of Plavix  · Continue on Plavix, aspirin, Lipitor, beta-blocker  · On heparin drip  · Patient is chest pain-free

## 2022-06-29 ENCOUNTER — TRANSITIONAL CARE MANAGEMENT (OUTPATIENT)
Dept: FAMILY MEDICINE CLINIC | Facility: CLINIC | Age: 52
End: 2022-06-29

## 2022-06-29 LAB
ANION GAP SERPL CALCULATED.3IONS-SCNC: 5 MMOL/L (ref 4–13)
APTT PPP: 80 SECONDS (ref 23–37)
ATRIAL RATE: 141 BPM
ATRIAL RATE: 65 BPM
ATRIAL RATE: 88 BPM
BUN SERPL-MCNC: 15 MG/DL (ref 5–25)
CALCIUM SERPL-MCNC: 8.6 MG/DL (ref 8.3–10.1)
CHLORIDE SERPL-SCNC: 111 MMOL/L (ref 100–108)
CO2 SERPL-SCNC: 25 MMOL/L (ref 21–32)
CREAT SERPL-MCNC: 1.46 MG/DL (ref 0.6–1.3)
ERYTHROCYTE [DISTWIDTH] IN BLOOD BY AUTOMATED COUNT: 13.3 % (ref 11.6–15.1)
GFR SERPL CREATININE-BSD FRML MDRD: 54 ML/MIN/1.73SQ M
GLUCOSE SERPL-MCNC: 100 MG/DL (ref 65–140)
GLUCOSE SERPL-MCNC: 104 MG/DL (ref 65–140)
GLUCOSE SERPL-MCNC: 112 MG/DL (ref 65–140)
GLUCOSE SERPL-MCNC: 115 MG/DL (ref 65–140)
GLUCOSE SERPL-MCNC: 119 MG/DL (ref 65–140)
GLUCOSE SERPL-MCNC: 133 MG/DL (ref 65–140)
HCT VFR BLD AUTO: 34.6 % (ref 36.5–49.3)
HGB BLD-MCNC: 11.1 G/DL (ref 12–17)
KCT BLD-ACNC: 276 SEC (ref 89–137)
MCH RBC QN AUTO: 30.4 PG (ref 26.8–34.3)
MCHC RBC AUTO-ENTMCNC: 32.1 G/DL (ref 31.4–37.4)
MCV RBC AUTO: 95 FL (ref 82–98)
PLATELET # BLD AUTO: 195 THOUSANDS/UL (ref 149–390)
PMV BLD AUTO: 12.5 FL (ref 8.9–12.7)
POTASSIUM SERPL-SCNC: 4 MMOL/L (ref 3.5–5.3)
QRS AXIS: 45 DEGREES
QRS AXIS: 50 DEGREES
QRS AXIS: 64 DEGREES
QRSD INTERVAL: 82 MS
QRSD INTERVAL: 86 MS
QRSD INTERVAL: 92 MS
QT INTERVAL: 350 MS
QT INTERVAL: 376 MS
QT INTERVAL: 378 MS
QTC INTERVAL: 442 MS
QTC INTERVAL: 475 MS
QTC INTERVAL: 480 MS
RBC # BLD AUTO: 3.65 MILLION/UL (ref 3.88–5.62)
SODIUM SERPL-SCNC: 141 MMOL/L (ref 136–145)
SPECIMEN SOURCE: ABNORMAL
T WAVE AXIS: 72 DEGREES
T WAVE AXIS: 89 DEGREES
T WAVE AXIS: 97 DEGREES
VENTRICULAR RATE: 95 BPM
VENTRICULAR RATE: 96 BPM
VENTRICULAR RATE: 98 BPM
WBC # BLD AUTO: 7.22 THOUSAND/UL (ref 4.31–10.16)

## 2022-06-29 PROCEDURE — NC001 PR NO CHARGE: Performed by: INTERNAL MEDICINE

## 2022-06-29 PROCEDURE — C1887 CATHETER, GUIDING: HCPCS | Performed by: INTERNAL MEDICINE

## 2022-06-29 PROCEDURE — 99232 SBSQ HOSP IP/OBS MODERATE 35: CPT | Performed by: INTERNAL MEDICINE

## 2022-06-29 PROCEDURE — 82948 REAGENT STRIP/BLOOD GLUCOSE: CPT

## 2022-06-29 PROCEDURE — 92928 PRQ TCAT PLMT NTRAC ST 1 LES: CPT | Performed by: INTERNAL MEDICINE

## 2022-06-29 PROCEDURE — 99222 1ST HOSP IP/OBS MODERATE 55: CPT | Performed by: INTERNAL MEDICINE

## 2022-06-29 PROCEDURE — C1769 GUIDE WIRE: HCPCS | Performed by: INTERNAL MEDICINE

## 2022-06-29 PROCEDURE — 93010 ELECTROCARDIOGRAM REPORT: CPT | Performed by: INTERNAL MEDICINE

## 2022-06-29 PROCEDURE — 85347 COAGULATION TIME ACTIVATED: CPT

## 2022-06-29 PROCEDURE — 85730 THROMBOPLASTIN TIME PARTIAL: CPT | Performed by: INTERNAL MEDICINE

## 2022-06-29 PROCEDURE — 93799 UNLISTED CV SVC/PROCEDURE: CPT | Performed by: INTERNAL MEDICINE

## 2022-06-29 PROCEDURE — 85027 COMPLETE CBC AUTOMATED: CPT | Performed by: INTERNAL MEDICINE

## 2022-06-29 PROCEDURE — C1874 STENT, COATED/COV W/DEL SYS: HCPCS | Performed by: INTERNAL MEDICINE

## 2022-06-29 PROCEDURE — 5A2204Z RESTORATION OF CARDIAC RHYTHM, SINGLE: ICD-10-PCS | Performed by: INTERNAL MEDICINE

## 2022-06-29 PROCEDURE — 99153 MOD SED SAME PHYS/QHP EA: CPT | Performed by: INTERNAL MEDICINE

## 2022-06-29 PROCEDURE — 99152 MOD SED SAME PHYS/QHP 5/>YRS: CPT | Performed by: INTERNAL MEDICINE

## 2022-06-29 PROCEDURE — C1761 CATH SHOCKWAVE C2 4 X 12MM: HCPCS | Performed by: INTERNAL MEDICINE

## 2022-06-29 PROCEDURE — 80048 BASIC METABOLIC PNL TOTAL CA: CPT | Performed by: INTERNAL MEDICINE

## 2022-06-29 PROCEDURE — 93005 ELECTROCARDIOGRAM TRACING: CPT

## 2022-06-29 PROCEDURE — C1725 CATH, TRANSLUMIN NON-LASER: HCPCS | Performed by: INTERNAL MEDICINE

## 2022-06-29 PROCEDURE — C9600 PERC DRUG-EL COR STENT SING: HCPCS | Performed by: INTERNAL MEDICINE

## 2022-06-29 PROCEDURE — 027135Z DILATION OF CORONARY ARTERY, TWO ARTERIES WITH TWO DRUG-ELUTING INTRALUMINAL DEVICES, PERCUTANEOUS APPROACH: ICD-10-PCS | Performed by: INTERNAL MEDICINE

## 2022-06-29 PROCEDURE — C1894 INTRO/SHEATH, NON-LASER: HCPCS | Performed by: INTERNAL MEDICINE

## 2022-06-29 PROCEDURE — 93458 L HRT ARTERY/VENTRICLE ANGIO: CPT | Performed by: INTERNAL MEDICINE

## 2022-06-29 DEVICE — XIENCE SKYPOINT™ EVEROLIMUS ELUTING CORONARY STENT SYSTEM 4.00 MM X 18 MM / RAPID-EXCHANGE
Type: IMPLANTABLE DEVICE | Site: CORONARY | Status: FUNCTIONAL
Brand: XIENCE SKYPOINT™

## 2022-06-29 DEVICE — XIENCE SKYPOINT™ EVEROLIMUS ELUTING CORONARY STENT SYSTEM 3.50 MM X 15 MM / RAPID-EXCHANGE
Type: IMPLANTABLE DEVICE | Site: CORONARY | Status: FUNCTIONAL
Brand: XIENCE SKYPOINT™

## 2022-06-29 RX ORDER — ACETAMINOPHEN 325 MG/1
650 TABLET ORAL EVERY 4 HOURS PRN
Status: CANCELLED | OUTPATIENT
Start: 2022-06-29

## 2022-06-29 RX ORDER — AMIODARONE HYDROCHLORIDE 200 MG/1
400 TABLET ORAL ONCE
Status: COMPLETED | OUTPATIENT
Start: 2022-06-29 | End: 2022-06-29

## 2022-06-29 RX ORDER — SODIUM CHLORIDE 9 MG/ML
75 INJECTION, SOLUTION INTRAVENOUS CONTINUOUS
Status: CANCELLED | OUTPATIENT
Start: 2022-06-29 | End: 2022-06-29

## 2022-06-29 RX ORDER — MIDAZOLAM HYDROCHLORIDE 2 MG/2ML
INJECTION, SOLUTION INTRAMUSCULAR; INTRAVENOUS AS NEEDED
Status: DISCONTINUED | OUTPATIENT
Start: 2022-06-29 | End: 2022-06-29 | Stop reason: HOSPADM

## 2022-06-29 RX ORDER — HEPARIN SODIUM 1000 [USP'U]/ML
INJECTION, SOLUTION INTRAVENOUS; SUBCUTANEOUS AS NEEDED
Status: DISCONTINUED | OUTPATIENT
Start: 2022-06-29 | End: 2022-06-29 | Stop reason: HOSPADM

## 2022-06-29 RX ORDER — NITROGLYCERIN 20 MG/100ML
INJECTION INTRAVENOUS AS NEEDED
Status: DISCONTINUED | OUTPATIENT
Start: 2022-06-29 | End: 2022-06-29 | Stop reason: HOSPADM

## 2022-06-29 RX ORDER — FUROSEMIDE 10 MG/ML
INJECTION INTRAMUSCULAR; INTRAVENOUS AS NEEDED
Status: DISCONTINUED | OUTPATIENT
Start: 2022-06-29 | End: 2022-06-29 | Stop reason: HOSPADM

## 2022-06-29 RX ORDER — FENTANYL CITRATE 50 UG/ML
INJECTION, SOLUTION INTRAMUSCULAR; INTRAVENOUS AS NEEDED
Status: DISCONTINUED | OUTPATIENT
Start: 2022-06-29 | End: 2022-06-29 | Stop reason: HOSPADM

## 2022-06-29 RX ORDER — VERAPAMIL HCL 2.5 MG/ML
AMPUL (ML) INTRAVENOUS AS NEEDED
Status: DISCONTINUED | OUTPATIENT
Start: 2022-06-29 | End: 2022-06-29 | Stop reason: HOSPADM

## 2022-06-29 RX ORDER — SODIUM CHLORIDE 9 MG/ML
75 INJECTION, SOLUTION INTRAVENOUS CONTINUOUS
Status: DISCONTINUED | OUTPATIENT
Start: 2022-06-29 | End: 2022-06-29

## 2022-06-29 RX ORDER — LIDOCAINE HYDROCHLORIDE 10 MG/ML
INJECTION, SOLUTION EPIDURAL; INFILTRATION; INTRACAUDAL; PERINEURAL AS NEEDED
Status: DISCONTINUED | OUTPATIENT
Start: 2022-06-29 | End: 2022-06-29 | Stop reason: HOSPADM

## 2022-06-29 RX ORDER — ASPIRIN 81 MG/1
324 TABLET, CHEWABLE ORAL ONCE
Status: COMPLETED | OUTPATIENT
Start: 2022-06-29 | End: 2022-06-29

## 2022-06-29 RX ORDER — OXYCODONE HYDROCHLORIDE AND ACETAMINOPHEN 5; 325 MG/1; MG/1
1 TABLET ORAL EVERY 4 HOURS PRN
Status: DISCONTINUED | OUTPATIENT
Start: 2022-06-29 | End: 2022-07-01 | Stop reason: HOSPADM

## 2022-06-29 RX ORDER — AMIODARONE HYDROCHLORIDE 200 MG/1
400 TABLET ORAL 2 TIMES DAILY WITH MEALS
Status: DISCONTINUED | OUTPATIENT
Start: 2022-06-30 | End: 2022-07-01

## 2022-06-29 RX ADMIN — MULTIPLE VITAMINS W/ MINERALS TAB 1 TABLET: TAB ORAL at 08:54

## 2022-06-29 RX ADMIN — CLOPIDOGREL BISULFATE 75 MG: 75 TABLET ORAL at 08:54

## 2022-06-29 RX ADMIN — SODIUM CHLORIDE 999 ML/HR: 0.9 INJECTION, SOLUTION INTRAVENOUS at 12:29

## 2022-06-29 RX ADMIN — AMIODARONE HYDROCHLORIDE 400 MG: 200 TABLET ORAL at 21:08

## 2022-06-29 RX ADMIN — ATORVASTATIN CALCIUM 40 MG: 40 TABLET, FILM COATED ORAL at 18:50

## 2022-06-29 RX ADMIN — SODIUM CHLORIDE 369 ML: 0.9 INJECTION, SOLUTION INTRAVENOUS at 08:59

## 2022-06-29 RX ADMIN — ASPIRIN 81 MG CHEWABLE TABLET 324 MG: 81 TABLET CHEWABLE at 08:53

## 2022-06-29 RX ADMIN — THIAMINE HCL TAB 100 MG 100 MG: 100 TAB at 08:53

## 2022-06-29 RX ADMIN — METOROPROLOL TARTRATE 5 MG: 5 INJECTION, SOLUTION INTRAVENOUS at 03:39

## 2022-06-29 RX ADMIN — POTASSIUM CHLORIDE 40 MEQ: 1500 TABLET, EXTENDED RELEASE ORAL at 08:54

## 2022-06-29 RX ADMIN — AMIODARONE HYDROCHLORIDE 400 MG: 200 TABLET ORAL at 18:50

## 2022-06-29 RX ADMIN — ASPIRIN 81 MG: 81 TABLET, COATED ORAL at 08:54

## 2022-06-29 RX ADMIN — CEFPODOXIME PROXETIL 200 MG: 200 TABLET, FILM COATED ORAL at 18:50

## 2022-06-29 RX ADMIN — HEPARIN SODIUM 25.1 UNITS/KG/HR: 10000 INJECTION, SOLUTION INTRAVENOUS at 09:00

## 2022-06-29 RX ADMIN — FUROSEMIDE 40 MG: 40 TABLET ORAL at 08:55

## 2022-06-29 RX ADMIN — CEFPODOXIME PROXETIL 200 MG: 200 TABLET, FILM COATED ORAL at 08:55

## 2022-06-29 RX ADMIN — METOPROLOL SUCCINATE 75 MG: 50 TABLET, EXTENDED RELEASE ORAL at 08:54

## 2022-06-29 RX ADMIN — FOLIC ACID 1 MG: 1 TABLET ORAL at 08:55

## 2022-06-29 NOTE — ASSESSMENT & PLAN NOTE
· S/p cardiac cath 6/27 with proximal LAD 90% severely calcified stenosis (unable to advance IVUS through lesion), mid circumflex discrete 30% stenosis and mid RCA discrete 755-80% stenosis  · Transferred to B for revascularization of prox LAD with atherectomy  · Pt received loading dose of Plavix  · Continue Plavix, asa, Lipitor and metoprolol  · Continue heparin drip  · Taken to a cardiac catheterization lab today; await further Cardiology recommendations

## 2022-06-29 NOTE — PROGRESS NOTES
Cardiology Progress note  Unit/Bed#: -46 Encounter: [id number]        Katelynn Mcgill 46 y o  male [id number]  Hospital Stay Days: 1    Assessment and Plan      Current Problem List   Principal Problem:    Atrial fibrillation [facility])  Active Problems:    Hypertension    Alcohol abuse    VASILE (acute kidney injury) ([facility] [address] )    PNA (pneumonia)    Acute systolic heart failure (HCC)    Coronary artery disease    Contact dermatitis    Pre-diabetes    Assessment/Plan:    1  Coronary artery disease  Left heart catheterization on 06/27 showed pLAD 90% severely calcified stenosis, mRCA 75-80% stenosis, mid circumflex 30% stenosis  Echo with moderate global hypokinesis with regional variation, EF 35%  LDL 69, A1c 5 9, hx of tobacco use  Status post Plavix and aspirin load  · Continue dual anti-platelet therapy with aspirin and Plavix  · Continue atorvastatin 40 mg daily  · Continue Toprol XL 75 mg daily  · PCI today  2   Ischemic cardiomyopathy with EF of 35%  Euvolemic  · Lasix oral 40 mg daily  · Toprol succinate 75 mg daily  · Will add ACEi/ARB/ARNi after cardiac catheterization  · Discuss life vest   · Salt and fluid restriction, daily weights and accurate ins and outs  3  New onset atrial fibrillation status post unsuccessful DCCV on 06/23  [id number] score 2  Rate controlled afib on tele  · Toprol XL 75 mg daily  · Heparin drip for anticoagulation- eventually transition to [facility]  · EP consulted  4  VASILE on CKD  Currently stable, high risk for SHOAIB    ·  IV fluid hydration pre-procedure  5  Prediabetes on sliding scale insulin  6  Pneumonia-per primary team  7  Tobacco use- recently quit  Interval HPI/Subjective     46years old male with past medical history of hyperlipidemia, tobacco and alcohol use who initially presented to [address] [place] [address] on 06/22 with four-day history of shortness of breath and palpitations    He was found to be in atrial fibrillation with rapid response with rates up to 170s status post unsuccess with DCCV on , new cardiomyopathy with EF of 35% likely ischemic in the setting of coronary artery disease with calcified stenosis of pLAD and mRCA, VASILE and pneumonia  Transferred to [facility] for PCI with atherectomy and EP evaluation  Today patient feels well  He had a short episode of rapid heart rate of 120-130, was asymptomatic  No complains this morning  Objective     Vitals: Temp (24hrs), Av 7 °F (36 5 °C), Min:97 6 °F (36 4 °C), Max:97 9 °F (36 6 °C)  Current: Temperature: 97 9 °F (36 6 °C)  Patient Vitals for the past 24 hrs:   BP Temp Temp src Pulse Resp SpO2 Height Weight   22 0811 116/85 97 9 °F (36 6 °C) Oral 93 18 95 % -- --   22 0541 -- -- -- -- -- -- -- 123 kg (270 lb 4 5 oz)   22 0341 116/86 97 7 °F (36 5 °C) -- 94 -- 93 % -- --   22 0336 -- -- -- (!) 139 -- 100 % -- --   22 0335 -- -- -- (!) 131 -- 98 % -- --   22 0318 110/85 97 7 °F (36 5 °C) -- 105 -- 94 % -- --   22 2221 -- -- -- -- -- 96 % 6' 2" (1 88 m) 124 kg (272 lb 4 3 oz)   22 2138 112/85 97 6 °F (36 4 °C) Oral 96 18 98 % -- --    Body mass index is 34 7 kg/m²  Physical Exam  Vitals and nursing note reviewed  Constitutional:       General: He is not in acute distress  Appearance: Normal appearance  He is obese  He is not ill-appearing or toxic-appearing  HENT:      Head: Normocephalic and atraumatic  Eyes:      Conjunctiva/sclera: Conjunctivae normal    Cardiovascular:      Rate and Rhythm: Normal rate  Rhythm irregular  Heart sounds: No murmur heard  No gallop  Pulmonary:      Effort: No respiratory distress  Breath sounds: Normal breath sounds  No wheezing or rales  Abdominal:      Palpations: Abdomen is soft  Tenderness: There is no abdominal tenderness  Musculoskeletal:         General: Normal range of motion  Cervical back: Normal range of motion        Right lower leg: No edema  Left lower leg: No edema  Neurological:      Mental Status: He is alert and oriented to person, place, and time  Invasive Devices  Report    Peripheral Intravenous Line  Duration           Peripheral IV 06/27/22 Dorsal (posterior); Left Wrist 2 days    Peripheral IV 06/27/22 Left Antecubital 2 days                    Labs:   Results from last 7 days   Lab Units 06/29/22  0503 06/28/22  0545 06/27/22  0559 06/25/22  0356 06/24/22  0446 06/23/22  0526   WBC Thousand/uL 7 22 7 98 7 84 8 00 9 69 14 21*   HEMOGLOBIN g/dL 11 1* 11 8* 11 8* 12 3 11 4* 11 9*   HEMATOCRIT % 34 6* 37 0 36 6 36 9 35 2* 35 3*   PLATELETS Thousands/uL 195 212 225 190 169 193   NEUTROS PCT %  --   --  62 57 68 80*   MONOS PCT %  --   --  9 9 10 9      Results from last 7 days   Lab Units 06/29/22  0503 06/28/22  0545 06/27/22  2226 06/27/22  0559 06/26/22  2000 06/26/22  1407 06/26/22  0653 06/25/22  2219 06/25/22  1540 06/25/22  1034 06/25/22  0356 06/24/22  1948 06/24/22  1320 06/24/22  0446 06/23/22  2201 06/23/22  1835 06/23/22  1258 06/23/22  0526 06/22/22  1811 06/22/22  1328 06/22/22  0920   SODIUM mmol/L 141 142  --  142  --   --  141  --   --   --  142  --   --  141  --  137  --  139 140 140  --    POTASSIUM mmol/L 4 0 4 2  --  3 9  --   --  3 9  --   --   --  3 4*  --   --  3 6  --  3 5  --  4 0 3 8 4 0  --    CHLORIDE mmol/L 111* 106  --  105  --   --  106  --   --   --  104  --   --  103  --  102  --  104 105 106  --    CO2 mmol/L 25 24  --  26  --   --  24  --   --   --  27  --   --  28  --  25  --  21 18* 18*  --    BUN mg/dL 15 16  --  18  --   --  21  --   --   --  23  --   --  20  --  20  --  21 21 21  --    CREATININE mg/dL 1 46* 1 46*  --  1 56*  --   --  1 49*  --   --   --  1 46*  --   --  1 55*  --  1 43*  --  1 58* 1 52* 1 51*  --    CALCIUM mg/dL 8 6 8 6  --  9 0  --   --  9 2  --   --   --  8 8  --   --  8 7  --  8 7  --  8 5 8 4 8 7  --    ALK PHOS U/L  --   --   --   --   --   --   --   -- --   --   --   --   --  65  --   --   --  69  --   --   --    ALT U/L  --   --   --   --   --   --   --   --   --   --   --   --   --  105*  --   --   --  133*  --   --   --    AST U/L  --   --   --   --   --   --   --   --   --   --   --   --   --  40  --   --   --  51*  --   --   --    MAGNESIUM mg/dL  --   --   --   --   --   --   --   --   --   --  2 3  --   --  2 3  --   --   --  2 5  --   --   --    PHOSPHORUS mg/dL  --   --   --   --   --   --   --   --   --   --  5 2*  --   --  3 7  --   --   --  4 2  --   --   --    INR   --   --   --  1 13  --   --   --   --   --   --   --   --   --   --   --   --   --   --   --   --   --    PTT seconds 80* 65* 82* 56* 69* 69* 59* 65* 63* 54* 77* 45* 70* 56* 64*  --  66* 39* 41*  --  25   EGFR ml/min/1 73sq m 54 54  --  50  --   --  53  --   --   --  54  --   --  51  --  56  --  49 52 52  --      Results from last 7 days   Lab Units 06/29/22  0503 06/28/22  0545 06/27/22 2226 06/27/22  0559 06/26/22 2000 06/26/22  1407 06/26/22  0653 06/25/22  2219 06/25/22  1540 06/25/22  1034 06/25/22  0356 06/24/22  1948 06/24/22  1320 06/24/22  0446 06/23/22  2201 06/23/22  1258 06/23/22  0526 06/22/22  1811 06/22/22  0920   INR   --   --   --  1 13  --   --   --   --   --   --   --   --   --   --   --   --   --   --   --    PTT seconds 80* 65* 82* 56* 69* 69* 59* 65* 63* 54* 77* 45* 70* 56* 64* 66* 39* 41* 25     Results from last 7 days   Lab Units 06/22/22  1103   LACTIC ACID mmol/L 0 7         No results found for: PHART, ILM9XTG, PO2ART, ZMW1CYR, W7PXGRQX, BEART, SOURCE  No components found for: HIV1X2  No results found for: HAV, HEPAIGM, HEPBIGM, HEPBCAB, HBEAG, HEPCAB  No results found for: SPEP, UPEP   Lab Results   Component Value Date    HGBA1C 5 9 (H) 06/22/2022    HGBA1C 5 8 (H) 12/16/2021     Lab Results   Component Value Date    CHOL 185 10/03/2014      Lab Results   Component Value Date    HDL 34 (L) 06/23/2022    HDL 36 (L) 12/16/2021    HDL 54 08/08/2020      Lab Results   Component Value Date    LDLCALC 69 06/23/2022    LDLCALC 120 (H) 12/16/2021    LDLCALC 133 (H) 08/08/2020      Lab Results   Component Value Date    TRIG 77 06/23/2022    TRIG 88 12/16/2021    TRIG 102 08/08/2020     No components found for: PROCAL      Micro:      Urinalysis:  No results found for: AMPHETUR, BDZUR, COCAINEUR, OPIATEUR, PCPUR, THCUR, ETOH, ACTMNPHEN, SALICYLATE       Invalid input(s): URIBILINOGEN        Intake and Outputs:  I/O       06/27 0701 06/28 0700 06/28 0701 06/29 0700 06/29 0701 06/30 0700    I V  (mL/kg)   810 (6 6)    Total Intake(mL/kg)   810 (6 6)    Net   +810           Unmeasured Urine Occurrence  2 x         Nutrition:  Diet Cardiovascular; Cardiac  Diet NPO; Sips of clear liquids  Radiology Results:   No orders to display     Scheduled Medications:  aspirin, 81 mg, Daily  atorvastatin, 40 mg, Daily With Dinner  cefpodoxime, 200 mg, BID With Meals  clopidogrel, 75 mg, Daily  folic acid, 1 mg, Daily  furosemide, 40 mg, Daily  insulin lispro, 1-5 Units, TID AC  insulin lispro, 1-5 Units, HS  metoprolol succinate, 75 mg, Daily  multivitamin-minerals, 1 tablet, Daily  potassium chloride, 40 mEq, Daily  sodium chloride, 3 mL/kg, Once  thiamine, 100 mg, Daily      PRN MEDS:  acetaminophen, 975 mg, Q8H PRN  diphenhydrAMINE-zinc acetate, , TID PRN  heparin (porcine), 2,000 Units, Q1H PRN  heparin (porcine), 4,000 Units, Q1H PRN  metoprolol, 5 mg, Q6H PRN  ondansetron, 4 mg, Q6H PRN      Last 24 Hour Meds: :   Medication Administration - last 24 hours from 06/28/2022 0859 to 06/29/2022 0859       Date/Time Order Dose Route Action Action by     06/29/2022 0854 aspirin (ECOTRIN LOW STRENGTH) EC tablet 81 mg 81 mg Oral Given Channing Jay RN     06/29/2022 0855 cefpodoxime Any Gains) tablet 200 mg 200 mg Oral Given Channing DAIANA Jay     06/29/2022 0854 clopidogrel (PLAVIX) tablet 75 mg 75 mg Oral Given Channing Jay RN     95/17/4129 6282 folic acid (FOLVITE) tablet 1 mg 1 mg Oral Given Roman Loya RN     06/29/2022 0855 furosemide (LASIX) tablet 40 mg 40 mg Oral Given Roman Loya RN     06/29/2022 0840 insulin lispro (HumaLOG) 100 units/mL subcutaneous injection 1-5 Units 1 Units Subcutaneous Not Given Roman Loya RN     06/28/2022 2159 insulin lispro (HumaLOG) 100 units/mL subcutaneous injection 1-5 Units 1 Units Subcutaneous Not Given Ginger Cueva RN     06/29/2022 0854 multivitamin-minerals (CENTRUM) tablet 1 tablet 1 tablet Oral Given Roman Loya RN     06/29/2022 0854 potassium chloride (K-DUR,KLOR-CON) CR tablet 40 mEq 40 mEq Oral Given Roman Loya RN     06/29/2022 5935 thiamine tablet 100 mg 100 mg Oral Given Roman Loya RN     06/29/2022 5672 sodium chloride 0 9 % infusion 0 mL/hr Intravenous Stopped Roman Loya RN     06/28/2022 2204 sodium chloride 0 9 % infusion 75 mL/hr Intravenous New Bag Liz Henriquez, DAIANA     06/29/2022 0339 metoprolol (LOPRESSOR) injection 5 mg 5 mg Intravenous Given Liz Henriquez RN     06/29/2022 0854 metoprolol succinate (TOPROL-XL) 24 hr tablet 75 mg 75 mg Oral Given Roman Loya RN     06/28/2022 2204 heparin (porcine) 25,000 units in 0 45% NaCl 250 mL infusion (premix) 25 1 Units/kg/hr Intravenous New Bag Liz Fuentes, DAIANA     06/29/2022 6304 aspirin chewable tablet 324 mg 324 mg Oral Given Roman Loya RN          PLEASE NOTE:  This encounter was completed utilizing the [employer] Direct Speech Voice Recognition Software  Grammatical errors, random word insertions, pronoun errors and incomplete sentences are occasional consequences of the system due to software limitations, ambient noise and hardware issues  These may be missed by proof reading prior to affixing electronic signature  Any questions or concerns about the content, text or information contained within the body of this dictation should be directly addressed to the physician for clarification  Please do not hesitate to call me directly if you have any any questions or concerns

## 2022-06-29 NOTE — UTILIZATION REVIEW
Inpatient Admission Authorization Request   NOTIFICATION OF INPATIENT ADMISSION/INPATIENT AUTHORIZATION REQUEST   SERVICING FACILITY:   [facility]  Address: [address] [place] [address], [address] [place] [address]  Tax ID: [id number]  NPI: [id number]  Place of Service: Inpatient [address] [facility] Code: 24     ATTENDING PROVIDER:  Attending Name and NPI#: Nancy Bradley Md [[id number]]  Address: [address] [place] [address], [address] [place] [address]  Phone: [phone]     UTILIZATION REVIEW CONTACT:  Erlinda Muniz Utilization   Network Utilization Review Department  Phone: [phone]  Fax: [phone]  Email: [email or web address]     PHYSICIAN ADVISORY SERVICES:  FOR YITC-UN-YXUP REVIEW - MEDICAL NECESSITY DENIAL  Phone: [phone]  Fax: [phone]  Email: [email or web address]     TYPE OF REQUEST:  Inpatient Status     ADMISSION INFORMATION:  ADMISSION DATE/TIME: 6/28/22  9:37 PM  PATIENT DIAGNOSIS CODE/DESCRIPTION:  Atrial fibrillation ([facility] [address] )  DISCHARGE DATE/TIME: No discharge date for patient encounter  IMPORTANT INFORMATION:  Please contact Erlinda Muniz directly with any questions or concerns regarding this request  Department voicemails are confidential     Send requests for admission clinical reviews, concurrent reviews, approvals, and administrative denials due to lack of clinical to fax [phone]

## 2022-06-29 NOTE — DISCHARGE INSTRUCTIONS
Please remember to take all medications as directed on your medication list and follow-up with your primary care provider in 1-2 weeks  You are encouraged to keep your med list on your person (in your wallet or purse) and please take your medication list provided in this After Visit Summary to your PCP visit following discharge  Please ask your nurse to show you the medication list and explain when to take your medications  Additionally, we encourage all patient's to take their actual medications with them to their primary care visit! This is to verify you have the proper medications and the proper dosages  Remember, while medications are often listed in your computer record; that may not always be right as mistakes can occur at the pharmacy or in the computer and sometimes old medication bottles can be mistaken for newer medications  (Note to nursing: please place patient's medication list on top of the AVS )      1  Please see the post angioplasty discharge instructions  No heavy lifting, greater than 10 lbs  or strenuous activity for 5 days  Follow angioplasty discharge instructions  2 Remove band aid tomorrow  Shower and wash area- wrist gently with soap and water- beginning tomorrow  Rinse and pat dry  Apply new water seal band aid  Repeat this process for 5 days  No powders, creams lotions or antibiotic ointments  for 5 days  No tub baths, hot tubs or swimming for 5 days  3  Call [place] [address] Cardiology Office ([phone]) if you develop a fever, redness or drainage at your wrist access site  4  No driving for 2 days    5  Do not stop aspirin or Plavix (clopiogrel) any reason without a cardiologists consent, or the stent could block up and cause a heart attack  6  Stent card and book

## 2022-06-29 NOTE — ASSESSMENT & PLAN NOTE
· CTA chest 6/22: "patchy groundglass patchy airspace disease throughout RUL consistent with acute infiltrate"   · S/p 4 days of IV ceftriaxone and 1 day azithromycin, completed antibiotics on PO vantin with last dose 6/29

## 2022-06-29 NOTE — CONSULTS
Consultation - Cardiology  Inga Patterson 46 y o  male MRN: [id number]  Unit/Bed#: BE CATH LAB ROOM Encounter: [id number]  Consults    History of Present Illness   Physician Requesting Consult: Ligia Slaughter MD  Reason for Consult / Principal Problem:  Atrial fibrillation    Assessment/Plan   Atrial fibrillation, new diagnosis  -rate control:  Currently mostly controlled  status post Coreg, status post digoxin:  On metoprolol succinate 75 mg daily,  -rhythm control:  Status post unsuccessful MIKO guided cardioversion on 06/23/2020 (3 times: 200, 300, 300 joules)  Status post amiodarone load on 06/22, amiodarone 400 mg b i d  from 06/23, DC yesterday  Will plan for cardioversion tomorrow (after left heart catheterization), will discuss need for MIKO as patient has been therapeutic on heparin drip since last cardioversion which did not show intracardiac thrombi   -systemic anticoagulation:  Chads Vasc score 3 (CHF history, hypertension history, CAD), on heparin drip with therapeutic PTTs from time of cardioversion on 06/13  After left heart catheterization will start on DOAC (will price check)  -discussed need for alcohol cessation    New cardiomyopathy  -possibly combined ischemic and atrial fibrillation related  Left heart catheterization with three-vessel disease including left main  Pending complex PCI today  -on Lasix 40 mg daily, metoprolol succinate 75 mg daily    Currently in VASILE, can include GDMT per primary cardiology team as CK improves    CAD  -left heart catheterization with triple-vessel disease including left main and prox LAD lesions, pending complex PCI  -on aspirin, atorvastatin, Plavix, metoprolol    VASILE on CKD stage II  -creatinine admission 1 56, slowly improving, 146 today ( baseline creatinine is 1)    HPI: Inga Patterson is a 46 y o  male with past medical history of hyperlipidemia presented to  on 06/22 with complaint of palpitations and shortness of breath for past few days and was found to have an atrial fibrillation with new cardiomyopathy  Patient was started on amiodarone, and underwent morelia guided cardioversion which will despite 3 times (200, 300, 300 joule)  In setting of new onset cardiomyopathy also underwent left heart catheterization while in [facility] which showed left main disease 90% stenosis, proximal LAD 90% stenosis, mid circ 30% stenosis, mid RCA 75-80% stenosis  He was transferred to [facility] for complex PCI and EP evaluation  Patient also noted to have VASILE on CKD and pneumonia for which getting treatment with antibiotics  Labs:  Creatinine 1 56 admission, 1 46 today, NT-proBNP 2533, TSH 1 047  -CTA with patchy ground-glass airspace disease in right upper lobe consistent with acute infiltrate infectious or inflammatory, mild pleural effusions    Primary Cardiologist:  None  Family history:  Father with multiple heart conditions (patient is unaware of details)  Social history:  Current smoker 1 pack a day for more than 20 years, history of heavy drinking (3 vodka drinks per day until recently)    [id number]        TELE:  Atrial fibrillation with rate in 80s, no events    Cardiac testing:   MORELIA 2022    Left Ventricle: Left ventricular cavity size is dilated  Wall thickness is normal  Systolic function is moderately reduced (35%)  There is moderate global hypokinesis with regional variation (hypokinesis of anterior wall)  Diastolic function is normal     Right Ventricle: Right ventricular cavity size is upper normal  Systolic function is normal     Left Atrium: The atrium is mildly dilated    Right Atrium: The atrium is mildly dilated    Aortic Valve: There is trace regurgitation    Mitral Valve: There is moderate to severe regurgitation    Tricuspid Valve: There is mild to moderate regurgitation  The right ventricular systolic pressure is mildly elevated (53 mm Hg)    Pulmonic Valve: There is trace regurgitation      IVC/SVC: The inferior vena cava is dilated  Respirophasic changes were blunted (less than 50% variation)  CATH:  6-  · Mid LM lesion is 90% stenosed  Proximal LAD 90% severely calcified stenosis  (unable to advanced IVUS through lesion)  Mid circumflex discrete 30% stenosis  Mid RCA discrete 75-80% stenosis    Review of Systems  ROS as noted above, otherwise 12 point review of systems was performed and is negative  Historical Information   Past Medical History:   Diagnosis Date    Chest pain 9/25/2014    Eczema     Hidradenitis suppurativa 2/14/2018    Rib pain 1/28/2015     Past Surgical History:   Procedure Laterality Date    CARDIAC CATHETERIZATION Left 6/27/2022    Procedure: Cardiac catheterization;  Surgeon: Jazmin Woodruff MD;  Location: [address] [place] [address] CATH LAB; Service: Cardiology    CARDIAC CATHETERIZATION N/A 6/27/2022    Procedure: Cardiac Coronary Angiogram;  Surgeon: Jazmin Woodruff MD;  Location: [address] [place] [address] CATH LAB; Service: Cardiology    CARDIAC CATHETERIZATION Left 6/27/2022    Procedure: Cardiac Left Heart Cath;  Surgeon: Jazmin Woodruff MD;  Location: [address] [place] [address] CATH LAB;   Service: Cardiology     Social History     Substance and Sexual Activity   Alcohol Use Yes    Comment: social; moderate     Social History     Substance and Sexual Activity   Drug Use No     Social History     Tobacco Use   Smoking Status Former Smoker    Packs/day: 1 00    Years: 20 00    Pack years: 20 00   Smokeless Tobacco Former User     Meds/Allergies   Hospital Medications:   Current Facility-Administered Medications   Medication Dose Route Frequency    acetaminophen (TYLENOL) tablet 975 mg  975 mg Oral Q8H PRN    aspirin (ECOTRIN LOW STRENGTH) EC tablet 81 mg  81 mg Oral Daily    atorvastatin (LIPITOR) tablet 40 mg  40 mg Oral Daily With Dinner    cefpodoxime (VANTIN) tablet 200 mg  200 mg Oral BID With Meals    clopidogrel (PLAVIX) tablet 75 mg  75 mg Oral Daily    diphenhydrAMINE-zinc acetate (BENADRYL) 2-0 1 % cream   Topical TID PRN    fentanyl citrate (PF) 100 KQK/3ZH    PRN    folic acid (FOLVITE) tablet 1 mg  1 mg Oral Daily    furosemide (LASIX) tablet 40 mg  40 mg Oral Daily    heparin (porcine) injection    PRN    insulin lispro (HumaLOG) 100 units/mL subcutaneous injection 1-5 Units  1-5 Units Subcutaneous TID AC    insulin lispro (HumaLOG) 100 units/mL subcutaneous injection 1-5 Units  1-5 Units Subcutaneous HS    lidocaine (PF) (XYLOCAINE-MPF) 1 % injection    PRN    metoprolol (LOPRESSOR) injection 5 mg  5 mg Intravenous Q6H PRN    metoprolol succinate (TOPROL-XL) 24 hr tablet 75 mg  75 mg Oral Daily    midazolam (VERSED) injection    PRN    multivitamin-minerals (CENTRUM) tablet 1 tablet  1 tablet Oral Daily    nitroGLYcerin 200 mcg/mL IA bolus    PRN    ondansetron (ZOFRAN) injection 4 mg  4 mg Intravenous Q6H PRN    potassium chloride (K-DUR,KLOR-CON) CR tablet 40 mEq  40 mEq Oral Daily    thiamine tablet 100 mg  100 mg Oral Daily    verapamil (ISOPTIN) injection    PRN     Home Medications:   Medications Prior to Admission   Medication    atorvastatin (LIPITOR) 10 mg tablet     No Known Allergies    Objective   Vitals: Blood pressure 110/79, pulse 101, temperature 98 1 °F (36 7 °C), temperature source Oral, resp  rate (!) 24, height 6' 2" (1 88 m), weight 123 kg (270 lb 4 5 oz), SpO2 100 %  Orthostatic Blood Pressures    Flowsheet Row Most Recent Value   Blood Pressure 110/79 filed at 06/29/2022 1204   Patient Position - Orthostatic VS Lying filed at 06/29/2022 1059          Intake/Output Summary (Last 24 hours) at 6/29/2022 1301  Last data filed at 6/29/2022 1229  Gross per 24 hour   Intake 1060 ml   Output --   Net 1060 ml     Invasive Devices  Report    Peripheral Intravenous Line  Duration           Peripheral IV 06/27/22 Dorsal (posterior); Left Wrist 2 days    Peripheral IV 06/27/22 Left Antecubital 2 days          Line  Duration           Arterial Sheath 6 Fr  Right Radial <1 day              Physical Exam   GEN: NAD, alert and oriented, well appearing  SKIN: dry without significant lesions or rashes  HEENT: NCAT, PERRL, EOMs intact  NECK: No JVD or carotid bruits appreciated  CARDIOVASCULAR:  Irregular rhythm, normal rate, normal S1, S2 without murmurs, rubs, or gallops appreciated  LUNGS: Clear to auscultation bilaterally without wheezes, rhonchi, or rales  ABDOMEN: Soft, nontender, nondistended  EXTREMITIES/VASCULAR: perfused without clubbing, cyanosis, or edema b/l  PSYCH: Normal mood and affect  NEURO: CN ll-Xll grossly intact    Lab Results: I have personally reviewed pertinent lab results  Results from last 7 days   Lab Units 06/29/22  0503 06/28/22  0545 06/27/22  0559   WBC Thousand/uL 7 22 7 98 7 84   HEMOGLOBIN g/dL 11 1* 11 8* 11 8*   HEMATOCRIT % 34 6* 37 0 36 6   PLATELETS Thousands/uL 195 212 225     Results from last 7 days   Lab Units 06/29/22  0503 06/28/22  0545 06/27/22  0559   POTASSIUM mmol/L 4 0 4 2 3 9   CHLORIDE mmol/L 111* 106 105   CO2 mmol/L 25 24 26   BUN mg/dL 15 16 18   CREATININE mg/dL 1 46* 1 46* 1 56*   CALCIUM mg/dL 8 6 8 6 9 0     Results from last 7 days   Lab Units 06/29/22  0503 06/28/22  0545 06/27/22  2226 06/27/22  0559   INR   --   --   --  1 13   PTT seconds 80* 65* 82* 56*     Results from last 7 days   Lab Units 06/25/22  0356 06/24/22  0446 06/23/22  0526   MAGNESIUM mg/dL 2 3 2 3 2 5     Imaging: I have personally reviewed pertinent reports

## 2022-06-29 NOTE — CASE MANAGEMENT
Case Management Assessment & Discharge Planning Note    Patient name Enrrique Holding  Location [address] [id number] MRN [id number]  : 1970 Date 2022       Current Admission Date: 2022  Current Admission Diagnosis:Atrial fibrillation [facility])   Patient Active Problem List    Diagnosis Date Noted    Coronary artery disease 2022    Contact dermatitis 2022    Pre-diabetes     Acute systolic heart failure ([facility] ) 2022    Morbid obesity ([facility] ) 2022    Hyperlipidemia 2022    CHF (congestive heart failure) ([facility] ) 2022    VASILE (acute kidney injury) ([facility] ) 2022    PNA (pneumonia) 2022    Atrial fibrillation ([facility] ) 2022    Chronic cough 2021    Smoker 2020    Hypertension 2020    Alcohol abuse 2020    Psoriasis 2019    Hydradenitis 2018    Carpal tunnel syndrome 2014    Depression with anxiety 2014      LOS (days): 1  Geometric Mean LOS (GMLOS) (days):   Days to GMLOS:     OBJECTIVE:  PATIENT READMITTED TO HOSPITAL  Risk of Unplanned Readmission Score: 11 11         Current admission status: Inpatient       Preferred Pharmacy:   [facility] [place], [address] [address]  Phone: [phone] Fax: [phone]    Primary Care Provider: Catina Yost MD    Primary Insurance: BLUE CROSS  Secondary Insurance:     ASSESSMENT:  Kira Guo Representative - Mother   Primary Phone: [phone] (Home)               Advance Directives  Does patient have a [address]?: No  Was patient offered paperwork?: Yes (declined)  Does patient currently have a Health Care decision maker?: Yes, please see Health Care Proxy section  Does patient have Advance Directives?: No  Was patient offered paperwork?: Yes (declined)  Primary Contact: Paz Gonzales (Mother) [phone]         Readmission Root Cause  30 Day Readmission: No    Patient Information  Admitted from[de-identified] Facility (transfer from [place])  Mental Status: Alert  During Assessment patient was accompanied by: Not accompanied during assessment  Assessment information provided by[de-identified] Patient  Primary Caregiver: Self  Support Systems: Self, Spouse/significant other, Parent  [place] of Residence: [place] [address] [address] you live [address]?: [address]   Select all that apply : Stairs  Number of steps to enter home : 3  Do the steps have railings?: Yes  Type of Current Residence: 2 story home  Upon entering residence, is there a bedroom on the main floor (no further steps)?: No  A bedroom is located on the following floor levels of residence (select all that apply):: 2nd Floor  Upon entering residence, is there a bathroom on the main floor (no further steps)?: No  Indicate which floors of current residence have a bathroom (select all the apply):: 2nd Floor  Number of steps to 2nd floor from main floor: One Flight  In the last 12 months, was there a time when you were not able to pay the mortgage or rent on time?: No  In the last 12 months, how many places have you lived?: 1  In the last 12 months, was there a time when you did not have a steady place to sleep or slept in a shelter (including now)?: No  Homeless/housing insecurity resource given?: N/A  Living Arrangements: Lives w/ Spouse/significant other, Lives w/ Parent(s)  Is patient a ?: No    Activities of Daily Living Prior to Admission  Functional Status: Independent  Ambulates independently?: Yes  Does patient use assisted devices?: No  Does patient currently own DME?: No  Does patient have a history of Outpatient Therapy (PT/OT)?: No  Does the patient have a history of Short-Term Rehab?: No  Does patient have a history of HHC?: No  Does patient currently have [address] ?: No         Patient Information Continued  Income Source: Employed (Patient is a )  Does patient have prescription coverage?: Yes (Patient uses [facility] in [place], would like to use [facility] at discharge)  Within the past 12 months, you worried that your food would run out before you got the money to buy more : Never true  Within the past 12 months, the food you bought just didn't last and you didn't have money to get more : Never true  Food insecurity resource given?: N/A  Does patient receive dialysis treatments?: No  Does patient have a history of substance abuse?: No  Does patient have a history of Mental Health Diagnosis?: No         Means of Transportation  Means of Transport to Appts[de-identified] Drives Self  In the past 12 months, has lack of transportation kept you from medical appointments or from getting medications?: No  In the past 12 months, has lack of transportation kept you from meetings, work, or from getting things needed for daily living?: No  Was application for public transport provided?: N/A        DISCHARGE DETAILS:    Discharge planning discussed with[de-identified] Patient  Freedom of Choice: Yes     CM contacted family/caregiver?: No- see comments (Patient declined CM outreach to family/emergency contact)  Were Treatment Team discharge recommendations reviewed with patient/caregiver?: Yes  Did patient/caregiver verbalize understanding of patient care needs?: Yes  Were patient/caregiver advised of the risks associated with not following Treatment Team discharge recommendations?: Yes    Contacts  Patient Contacts: Salvador Brown  Relationship to Patient[de-identified] Family  Contact Method: Phone  Phone Number: [phone]  Reason/Outcome: Emergency Contact                   Would you like to participate in our [address] service program?  : Yes                              [facility] TE with railing, with spouse and parents  Patient is independent with ADLs, does not use assistive devices and does not own any DME  Patient denies any STR, HHC, or outpatient therapy history   Patient denies any substance abuse, mental health dx/hosptializations, or dialysis  Patient drives self, has family available to provide transportation at time of discharge  Patient uses [facility] in [place], would like to use [facility] at discharge  Patient is vaccinated for Covid-19 with David Mcneal, x1 booster which was received about 4 months ago  CM reviewed d/c planning process including the following: identifying help at home, patient preference for d/c planning needs, Discharge Lounge, [facility] Meds to Bed program, availability of treatment team to discuss questions or concerns patient and/or family may have regarding understanding medications and recognizing signs and symptoms once discharged  CM also encouraged patient to follow up with all recommended appointments after discharge  Patient advised of importance for patient and family to participate in managing patients medical well being

## 2022-06-29 NOTE — UTILIZATION REVIEW
Notification of Discharge   This is a Notification of Discharge from our facility [address]  Please be advised that this patient has been discharge from our facility  Below you will find the admission and discharge date and time including the patients disposition  UTILIZATION REVIEW CONTACT:  Cara Xiao  Utilization   Network Utilization Review Department  Phone: [phone] x carefully listen to the prompts  All voicemails are confidential   Email: [email or web address]     PHYSICIAN ADVISORY SERVICES:  FOR LTOZ-RO-EWOD REVIEW - MEDICAL NECESSITY DENIAL  Phone: [phone]  Fax: [phone]  Email: [email or web address]     PRESENTATION DATE: 6/21/2022  8:39 PM  OBERVATION ADMISSION DATE:   INPATIENT ADMISSION DATE: 6/21/22 11:43 PM   DISCHARGE DATE: 6/28/2022  8:00 PM  DISPOSITION: [address] [facility]      IMPORTANT INFORMATION:  Send all requests for admission clinical reviews, approved or denied determinations and any other requests to dedicated fax number below belonging to the campus where the patient is receiving treatment   List of dedicated fax numbers:  [address] DENIALS (Administrative/Medical Necessity) [phone]   [address] [place] [address] (Maternity/NICU/Pediatrics) [phone]   [facility] [phone]   [address] [facility] [phone]   [address] [place] [address] [phone]   [address] [facility] [address] [place] [address],[address] [place] [address] [phone]   [facility]  [phone]   [address] [place] [address], [facility]  [address] [facility] [address] [facility] [phone]

## 2022-06-29 NOTE — PROGRESS NOTES
[address] [facility]  Progress Note - Wanamie Blank 1970, 46 y o  male MRN: [id number]  Unit/Bed#: BE CATH LAB ROOM Encounter: [id number]  Primary Care Provider: Landen Saravia MD   Date and time admitted to hospital: 6/28/2022  9:37 PM    Pre-diabetes  Assessment & Plan  · A1c 5 9  · SSI, hypoglycemia protocol    Contact dermatitis  Assessment & Plan  · Likely due to bed sheets, on regions of direct contact to the bed  · PRN benadryl    Coronary artery disease  Assessment & Plan  · S/p cardiac cath 6/27 with proximal LAD 90% severely calcified stenosis (unable to advance IVUS through lesion), mid circumflex discrete 30% stenosis and mid RCA discrete 755-80% stenosis  · Transferred to [facility] for revascularization of prox LAD with atherectomy  · Pt received loading dose of Plavix  · Continue Plavix, asa, Lipitor and metoprolol  · Continue heparin drip  · Taken to a cardiac catheterization lab today; await further Cardiology recommendations    Acute systolic heart failure ([facility] )  Assessment & Plan  Wt Readings from Last 3 Encounters:   06/29/22 123 kg (270 lb 4 5 oz)   06/28/22 124 kg (272 lb 4 3 oz)   11/16/21 124 kg (274 lb)     · Likely brought on by afib  · MIKO 6/23: EF 35% with dilated LV and moderate global hypokinesis, moderate MR and mild TR  · Cath as noted above  · Appears to be euvolemic, continue lasix 40mg PO daily  · Monitor volume status with IVF        PNA (pneumonia)  Assessment & Plan  · CTA chest 6/22: "patchy groundglass patchy airspace disease throughout RUL consistent with acute infiltrate"   · S/p 4 days of IV ceftriaxone and 1 day azithromycin, completed antibiotics on PO vantin with last dose 6/29    VASILE (acute kidney injury) ([facility] [address] )  Assessment & Plan  · Baseline creatinine approximately 1 1-1 2; today is 1 46  · Likely due to hypoperfusion due to afib RVR, acite CHF  · Nephrology consulted, optimized for cardiac cath  · Avoid nephrotoxins, hypotension  · Continue IVF gentle hydration    Alcohol abuse  Assessment & Plan  · CIWA dc'd, out of withdrawal window  · Continue thiamine, folic acid, multivitamin    Hypertension  Assessment & Plan  · BP WNL    * Atrial fibrillation (HCC)  Assessment & Plan  · New onset afib with RVR s/p failed DCCV on 6/23, now on amiodarone  · Continue lopressor for rate control  · Cardiology following, planned to reattempt cardioversion vs ablation vs EP eval pending cath results  · Continue IV heparin drip with eventual transition to [address]        Attempted to see patient, however patient was in cath lab  Problem list as above, await further Cardiology recommendations and monitor on labs in the morning

## 2022-06-29 NOTE — ASSESSMENT & PLAN NOTE
Wt Readings from Last 3 Encounters:   06/29/22 123 kg (270 lb 4 5 oz)   06/28/22 124 kg (272 lb 4 3 oz)   11/16/21 124 kg (274 lb)     · Likely brought on by afib  · MIKO 6/23: EF 35% with dilated LV and moderate global hypokinesis, moderate MR and mild TR  · Cath as noted above  · Appears to be euvolemic, continue lasix 40mg PO daily  · Monitor volume status with IVF

## 2022-06-29 NOTE — ASSESSMENT & PLAN NOTE
· Baseline creatinine approximately 1 1-1 2; today is 1 46  · Likely due to hypoperfusion due to afib RVR, acite CHF  · Nephrology consulted, optimized for cardiac cath  · Avoid nephrotoxins, hypotension  · Continue IVF gentle hydration

## 2022-06-29 NOTE — UTILIZATION REVIEW
Initial Clinical Review    Admission: Date/Time/Statement:   Admission Orders (From admission, onward)     Ordered        06/28/22 2149  Inpatient Admission  Once                      Orders Placed This Encounter   Procedures    Inpatient Admission     Standing Status:   Standing     Number of Occurrences:   1     Order Specific Question:   Level of Care     Answer:   Med Surg [16]     Order Specific Question:   Estimated length of stay     Answer:   More than 2 Midnights     Order Specific Question:   Certification     Answer:   I certify that inpatient services are medically necessary for this patient for a duration of greater than two midnights  See H&P and MD Progress Notes for additional information about the patient's course of treatment  Initial Presentation: 46 y o  male , presented to the ED @ [facility],  Admitted  Transferred to [facility], higher level of care, via EMS  Admitted as Inpatient due to  Atrial Fibrillation  Found to have low EF and taken for [facility] with two vessel disease requiring potential artherectomy  Transfer to [facility]  · MIKO 6/23: EF 35% with dilated LV and moderate global hypokinesis, moderate MR and mild TR  · S/p cardiac cath 6/27 with proximal LAD 90% severely calcified stenosis (unable to advance IVUS through lesion), mid circumflex discrete 30% stenosis and mid RCA discrete 755-80% stenosis    Date: 06/28/2022   New onset A  Fib with RVR s/p failed DCCV on 6/23, now on amiodarone  Continue lopressor for rate control  Continue IV heparin gtt  Continue ASA  Plavix, Lipitor & BB  Continue IV flds  Monitor & trend BUN/Cr  Consult Nephrology to optimize prior to cardiac cath  CIWA DCed, out of withdrawal window, but continue thiamine, folic acid and Multivit  Day 2: 06/29/2022   Monitor on telemetry  Continue IV heparin gtt  Continue IV flds  Continue ASA, Plavix, statin, BB  Waiting for further cardiology recommendations        06/29/2022  Consult Cardio:  1  Coronary artery disease:  Left heart catheterization on 06/27 showed pLAD 90% severely calcified stenosis, mRCA 75-80% stenosis, mid circumflex 30% stenosis  Echo with moderate global hypokinesis with regional variation, EF 35%  LDL 69, A1c 5 9, hx of tobacco use  Status post Plavix and aspirin load  Continue dual anti-platelet therapy with aspirin and Plavix  Continue atorvastatin 40 mg daily  Continue Toprol XL 75 mg daily  PCI today  2   Ischemic cardiomyopathy with EF of 35%:  Euvolemic  Lasix oral 40 mg daily  Toprol succinate 75 mg daily  Will add ACEi/ARB/ARNi after cardiac catheterization  Discuss life vest   Salt and fluid restriction, daily weights and accurate ins and outs  3  New onset atrial fibrillation status post unsuccessful DCCV on 06/23:  [id number] score 2  Rate controlled afib on tele  Toprol XL 75 mg daily  Heparin drip for anticoagulation- eventually transition to [address]  EP consulted  4  VASILE on CKD:    Currently stable, high risk for SHOAIB  IV fluid hydration pre-procedure  5  Prediabetes on sliding scale insulin  6  Pneumonia-per primary team   7  Tobacco use- recently quit     06/29/2022  Consult EPS:  Atrial fibrillation, new diagnosis:  -rate control:  Currently mostly controlled  status post Coreg, status post digoxin:  On metoprolol succinate 75 mg daily,  -rhythm control:  Status post unsuccessful MIKO guided cardioversion on 06/23/2020 (3 times: 200, 300, 300 joules)  Status post amiodarone load on 06/22, amiodarone 400 mg b i d  from 06/23, DC yesterday  Will plan for cardioversion tomorrow (after left heart catheterization), will discuss need for MIKO as patient has been therapeutic on heparin drip since last cardioversion which did not show intracardiac thrombi   -systemic anticoagulation:  Chads Vasc score 3 (CHF history, hypertension history, CAD), on heparin drip with therapeutic PTTs from time of cardioversion on 06/13    After left heart catheterization will start on DOAC (will price check)  -discussed need for alcohol cessation  New cardiomyopathy:  -possibly combined ischemic and atrial fibrillation related  Left heart catheterization with three-vessel disease including left main  Pending complex PCI today  -on Lasix 40 mg daily, metoprolol succinate 75 mg daily  Currently in AVSILE, can include GDMT per primary cardiology team as CK improves  CAD:  -left heart catheterization with triple-vessel disease including left main and prox LAD lesions, pending complex PCI  -on aspirin, atorvastatin, Plavix, metoprolol  VASILE on CKD stage II:    -creatinine admission 1 56, slowly improving, 1 46 today ( baseline creatinine is 1)  Triage Vitals   Temperature Pulse Respirations Blood Pressure SpO2   06/28/22 2138 06/28/22 2138 06/28/22 2138 06/28/22 2138 06/28/22 2138   97 6 °F (36 4 °C) 96 18 112/85 98 %      Temp Source Heart Rate Source Patient Position - Orthostatic VS BP Location FiO2 (%)   06/28/22 2138 06/29/22 1204 06/28/22 2138 06/29/22 0811 --   Oral Monitor Lying Right arm       Pain Score       06/28/22 2221       No Pain          Wt Readings from Last 1 Encounters:   06/29/22 123 kg (270 lb 4 5 oz)     Additional Vital Signs:   Date/Time Temp Pulse Resp BP MAP (mmHg) SpO2 Calculated FIO2 (%) - Nasal Cannula Nasal Cannula O2 Flow Rate (L/min) O2 Device Patient Position - Orthostatic VS   06/29/22 12:15:53 -- -- -- -- -- -- 28 2 L/min Nasal cannula --   06/29/22 1204 -- 101 24 Abnormal  110/79 -- 100 % -- -- None (Room air) --   Comment rows:   OBSERV: Received to [facility] holding area elective study   Aware of planned procedure offering no questions   Cooperative and appropriate  Martine Haile MD and informed consent obtained   at 06/29/22 1204   06/29/22 10:59:37 98 1 °F (36 7 °C) 51 Abnormal  16 116/83 94 93 % -- -- -- Lying   06/29/22 08:11:53 97 9 °F (36 6 °C) 93 18 116/85 95 95 % -- -- -- Lying   06/29/22 0800 -- -- -- -- -- -- -- -- None (Room air) --   22 03:41:47 97 7 °F (36 5 °C) 94 -- 116/86 96 93 % -- -- -- --   22 -- 139 Abnormal  -- -- -- 100 % -- -- -- --   225 -- 131 Abnormal  -- -- -- 98 % -- -- -- --   22 03:18:39 97 7 °F (36 5 °C) 105 -- 110/85 93 94 % -- -- -- --   22 22:21:26 -- -- -- -- -- 96 % -- -- None (Room air) --     Date and Time Eye Opening Best Verbal Response Best Motor Response Tigre Coma Scale Score   22 0800 4 5 6 15   22 2221 4 5 6 15       2022 @ 2204  EC, A  Fib    2022 @   [place]:  Cardiac Cath:  Impression:  Mid LM lesion is 90% stenosed  Proximal LAD 90% severely calcified stenosis  (unable to advanced IVUS through lesion)  Mid circumflex discrete 30% stenosis  Mid RCA discrete 75-80% stenosis  Recommendation:   Patient will benefit from coronary revascularization of proximal LAD using atherectomy given severe calcification  He will also benefit from revascularization of RCA  Patient will be transferred to [facility] for proximal LAD revascularization using atherectomy         Pertinent Labs/Diagnostic Test Results:   Results from last 7 days   Lab Units 22  0503 22  0545 22  0559 22  0356 22  0446   WBC Thousand/uL 7 22 7 98 7 84 8 00 9 69   HEMOGLOBIN g/dL 11 1* 11 8* 11 8* 12 3 11 4*   HEMATOCRIT % 34 6* 37 0 36 6 36 9 35 2*   PLATELETS Thousands/uL 195 212 225 190 169   NEUTROS ABS Thousands/µL  --   --  4 92 4 60 6 62     Results from last 7 days   Lab Units 22  0503 22  0545 22  0559 22  0653 22  0356 22  0446 22  1835 22  0526   SODIUM mmol/L 141 142 142 141 142 141   < > 139   POTASSIUM mmol/L 4 0 4 2 3 9 3 9 3 4* 3 6   < > 4 0   CHLORIDE mmol/L 111* 106 105 106 104 103   < > 104   CO2 mmol/L 25 24 26 24 27 28   < > 21   ANION GAP mmol/L 5 12 11 11 11 10   < > 14*   BUN mg/dL 15 16 18 21 23 20   < > 21   CREATININE mg/dL 1 46* 1 46* 1 56* 1 49* 1 46* 1 55*   < > 1 58*   EGFR ml/min/1 73sq m 54 54 50 53 54 51   < > 49   CALCIUM mg/dL 8 6 8 6 9 0 9 2 8 8 8 7   < > 8 5   CALCIUM, IONIZED mmol/L  --   --   --   --  1 17 1 14  --  1 13   MAGNESIUM mg/dL  --   --   --   --  2 3 2 3  --  2 5   PHOSPHORUS mg/dL  --   --   --   --  5 2* 3 7  --  4 2    < > = values in this interval not displayed       Results from last 7 days   Lab Units 06/24/22  0446 06/23/22  0526   AST U/L 40 51*   ALT U/L 105* 133*   ALK PHOS U/L 65 69   TOTAL PROTEIN g/dL 7 3 7 5   ALBUMIN g/dL 3 2* 3 5   TOTAL BILIRUBIN mg/dL 0 37 0 47     Results from last 7 days   Lab Units 06/29/22  1100 06/29/22  0549 06/28/22  2158 06/28/22  2056 06/28/22  1549 06/28/22  1033 06/28/22  0548 06/27/22  2048 06/27/22  1615 06/27/22  0603 06/26/22  2056 06/26/22  1544   POC GLUCOSE mg/dl 100 115 108 105 126 126 119 129 126 109 126 133     Results from last 7 days   Lab Units 06/29/22  0503 06/28/22  0545 06/27/22  0559 06/26/22  0653 06/25/22  0356 06/24/22  0446 06/23/22  1835 06/23/22  0526 06/22/22  1811 06/22/22  1328   GLUCOSE RANDOM mg/dL 104 107 107 112 118 107 103 129 141* 136      Results from last 7 days   Lab Units 06/22/22  1331   PH ART  7 400   PCO2 ART mm Hg 27 1*   PO2 ART mm Hg 82 8   HCO3 ART mmol/L 16 4*   BASE EXC ART mmol/L -6 9   O2 CONTENT ART mL/dL 17 2   O2 HGB, ARTERIAL % 95 0   ABG SOURCE  Line, Arterial     Results from last 7 days   Lab Units 06/29/22  0503 06/28/22  0545 06/27/22  2226 06/27/22  0559   PROTIME seconds  --   --   --  14 1   INR   --   --   --  1 13   PTT seconds 80* 65* 82* 56*     Results from last 7 days   Lab Units 06/24/22  0446 06/23/22  0526   PROCALCITONIN ng/ml 0 24 0 28*     Results from last 7 days   Lab Units 06/27/22  1839   OSMO UR mmol/     Results from last 7 days   Lab Units 06/27/22  1839 06/23/22  1053   SODIUM UR  90  --    CREATININE UR mg/dL 64 7 96 6     Past Medical History:   Diagnosis Date    Chest pain 9/25/2014    Eczema     Hidradenitis suppurativa 2/14/2018    Rib pain 1/28/2015     Present on Admission:   Atrial fibrillation ([facility] )   Acute systolic heart failure (HCC)   PNA (pneumonia)   Hypertension   Alcohol abuse   Coronary artery disease   VASILE (acute kidney injury) ([facility] )   Contact dermatitis   Pre-diabetes      Admitting Diagnosis: Atrial fibrillation (HCC)  Age/Sex: 46 y o  male  Admission Orders:  NPO > Cardiac Cath  Up with Assistance  Daily weight  I&O  Dysphagia Assessment  Telemetry  [place] SCDs    Scheduled Medications:  aspirin, 81 mg, Oral, Daily  atorvastatin, 40 mg, Oral, Daily With Dinner  cefpodoxime, 200 mg, Oral, BID With Meals  clopidogrel, 75 mg, Oral, Daily  folic acid, 1 mg, Oral, Daily  furosemide, 40 mg, Oral, Daily  insulin lispro, 1-5 Units, Subcutaneous, TID AC  insulin lispro, 1-5 Units, Subcutaneous, HS  metoprolol succinate, 75 mg, Oral, Daily  multivitamin-minerals, 1 tablet, Oral, Daily  potassium chloride, 40 mEq, Oral, Daily  thiamine, 100 mg, Oral, Daily      Continuous IV Infusions:  heparin (porcine), 3-20 Units/kg/hr (Order-Specific), Intravenous, Titrated  sodium chloride, 75 mL/hr, Intravenous, Continuous      PRN Meds:  acetaminophen, 975 mg, Oral, Q8H PRN  diphenhydrAMINE-zinc acetate, , Topical, TID PRN  fentanyl citrate (PF), , , PRN  heparin (porcine), 2,000 Units, Intravenous, Q1H PRN  heparin (porcine), 4,000 Units, Intravenous, Q1H PRN  metoprolol, 5 mg, Intravenous, Q6H PRN  midazolam, , , PRN  ondansetron, 4 mg, Intravenous, Q6H PRN        IP CONSULT TO CARDIOLOGY  IP CONSULT TO ELECTROPHYSIOLOGY    Network Utilization Review Department  ATTENTION: Please call with any questions or concerns to [phone] and carefully listen to the prompts so that you are directed to the right person   All voicemails are confidential   [facility] all requests for admission clinical reviews, approved or denied determinations and any other requests to dedicated fax number below belonging to the campus where the patient is receiving treatment   List of dedicated fax numbers for the Facilities:  [address] DENIALS (Administrative/Medical Necessity) [phone]   [address]  (Maternity/NICU/Pediatrics) [phone] [address]  [address] [place] [address] [phone] [address] [facility] [facility] [phone]

## 2022-06-29 NOTE — CONSULTS
Consult completed by Bertram Barrett PA-C and Dr Digna Tran on 6/22/2022  Please see progress note from today

## 2022-06-30 ENCOUNTER — ANESTHESIA (INPATIENT)
Dept: NON INVASIVE DIAGNOSTICS | Facility: HOSPITAL | Age: 52
DRG: 246 | End: 2022-06-30
Payer: COMMERCIAL

## 2022-06-30 LAB
ANION GAP SERPL CALCULATED.3IONS-SCNC: 9 MMOL/L (ref 4–13)
AORTIC ROOT: 3.7 CM
ASCENDING AORTA: 3.4 CM
ATRIAL RATE: 66 BPM
ATRIAL RATE: 82 BPM
BASOPHILS # BLD MANUAL: 0 THOUSAND/UL (ref 0–0.1)
BASOPHILS NFR MAR MANUAL: 0 % (ref 0–1)
BUN SERPL-MCNC: 16 MG/DL (ref 5–25)
CALCIUM SERPL-MCNC: 9.1 MG/DL (ref 8.3–10.1)
CHLORIDE SERPL-SCNC: 108 MMOL/L (ref 100–108)
CO2 SERPL-SCNC: 23 MMOL/L (ref 21–32)
CREAT SERPL-MCNC: 1.45 MG/DL (ref 0.6–1.3)
EOSINOPHIL # BLD MANUAL: 0.21 THOUSAND/UL (ref 0–0.4)
EOSINOPHIL NFR BLD MANUAL: 3 % (ref 0–6)
ERYTHROCYTE [DISTWIDTH] IN BLOOD BY AUTOMATED COUNT: 13.3 % (ref 11.6–15.1)
GFR SERPL CREATININE-BSD FRML MDRD: 55 ML/MIN/1.73SQ M
GLUCOSE SERPL-MCNC: 121 MG/DL (ref 65–140)
GLUCOSE SERPL-MCNC: 133 MG/DL (ref 65–140)
GLUCOSE SERPL-MCNC: 98 MG/DL (ref 65–140)
HCT VFR BLD AUTO: 35.9 % (ref 36.5–49.3)
HGB BLD-MCNC: 11.5 G/DL (ref 12–17)
LYMPHOCYTES # BLD AUTO: 1.13 THOUSAND/UL (ref 0.6–4.47)
LYMPHOCYTES # BLD AUTO: 16 % (ref 14–44)
MAGNESIUM SERPL-MCNC: 2.5 MG/DL (ref 1.6–2.6)
MCH RBC QN AUTO: 30.3 PG (ref 26.8–34.3)
MCHC RBC AUTO-ENTMCNC: 32 G/DL (ref 31.4–37.4)
MCV RBC AUTO: 95 FL (ref 82–98)
MONOCYTES # BLD AUTO: 0.49 THOUSAND/UL (ref 0–1.22)
MONOCYTES NFR BLD: 7 % (ref 4–12)
MYELOCYTES NFR BLD MANUAL: 1 % (ref 0–1)
NEUTROPHILS # BLD MANUAL: 5.15 THOUSAND/UL (ref 1.85–7.62)
NEUTS SEG NFR BLD AUTO: 73 % (ref 43–75)
P AXIS: 54 DEGREES
PHOSPHATE SERPL-MCNC: 4.7 MG/DL (ref 2.7–4.5)
PLATELET # BLD AUTO: 205 THOUSANDS/UL (ref 149–390)
PLATELET BLD QL SMEAR: ADEQUATE
PMV BLD AUTO: 12.4 FL (ref 8.9–12.7)
POLYCHROMASIA BLD QL SMEAR: PRESENT
POTASSIUM SERPL-SCNC: 4 MMOL/L (ref 3.5–5.3)
PR INTERVAL: 186 MS
QRS AXIS: 62 DEGREES
QRS AXIS: 83 DEGREES
QRSD INTERVAL: 90 MS
QRSD INTERVAL: 90 MS
QT INTERVAL: 374 MS
QT INTERVAL: 412 MS
QTC INTERVAL: 431 MS
QTC INTERVAL: 472 MS
RBC # BLD AUTO: 3.8 MILLION/UL (ref 3.88–5.62)
RBC MORPH BLD: PRESENT
SL CV LV EF: 40
SODIUM SERPL-SCNC: 140 MMOL/L (ref 136–145)
T WAVE AXIS: 89 DEGREES
T WAVE AXIS: 94 DEGREES
TR MAX PG: 24 MMHG
TR PEAK VELOCITY: 2.4 M/S
VENTRICULAR RATE: 66 BPM
VENTRICULAR RATE: 96 BPM
WBC # BLD AUTO: 7.05 THOUSAND/UL (ref 4.31–10.16)

## 2022-06-30 PROCEDURE — 99232 SBSQ HOSP IP/OBS MODERATE 35: CPT | Performed by: INTERNAL MEDICINE

## 2022-06-30 PROCEDURE — 76376 3D RENDER W/INTRP POSTPROCES: CPT | Performed by: INTERNAL MEDICINE

## 2022-06-30 PROCEDURE — 92960 CARDIOVERSION ELECTRIC EXT: CPT | Performed by: INTERNAL MEDICINE

## 2022-06-30 PROCEDURE — 93320 DOPPLER ECHO COMPLETE: CPT | Performed by: INTERNAL MEDICINE

## 2022-06-30 PROCEDURE — 92960 CARDIOVERSION ELECTRIC EXT: CPT

## 2022-06-30 PROCEDURE — 76376 3D RENDER W/INTRP POSTPROCES: CPT

## 2022-06-30 PROCEDURE — 85027 COMPLETE CBC AUTOMATED: CPT | Performed by: INTERNAL MEDICINE

## 2022-06-30 PROCEDURE — 84100 ASSAY OF PHOSPHORUS: CPT | Performed by: INTERNAL MEDICINE

## 2022-06-30 PROCEDURE — 99233 SBSQ HOSP IP/OBS HIGH 50: CPT | Performed by: INTERNAL MEDICINE

## 2022-06-30 PROCEDURE — 93005 ELECTROCARDIOGRAM TRACING: CPT

## 2022-06-30 PROCEDURE — 80048 BASIC METABOLIC PNL TOTAL CA: CPT | Performed by: INTERNAL MEDICINE

## 2022-06-30 PROCEDURE — 93312 ECHO TRANSESOPHAGEAL: CPT

## 2022-06-30 PROCEDURE — 83735 ASSAY OF MAGNESIUM: CPT | Performed by: INTERNAL MEDICINE

## 2022-06-30 PROCEDURE — 93325 DOPPLER ECHO COLOR FLOW MAPG: CPT | Performed by: INTERNAL MEDICINE

## 2022-06-30 PROCEDURE — 93010 ELECTROCARDIOGRAM REPORT: CPT | Performed by: INTERNAL MEDICINE

## 2022-06-30 PROCEDURE — 85007 BL SMEAR W/DIFF WBC COUNT: CPT | Performed by: INTERNAL MEDICINE

## 2022-06-30 PROCEDURE — 93312 ECHO TRANSESOPHAGEAL: CPT | Performed by: INTERNAL MEDICINE

## 2022-06-30 PROCEDURE — 82948 REAGENT STRIP/BLOOD GLUCOSE: CPT

## 2022-06-30 RX ORDER — SODIUM CHLORIDE 9 MG/ML
INJECTION, SOLUTION INTRAVENOUS CONTINUOUS PRN
Status: DISCONTINUED | OUTPATIENT
Start: 2022-06-30 | End: 2022-06-30

## 2022-06-30 RX ORDER — LIDOCAINE HYDROCHLORIDE 10 MG/ML
INJECTION, SOLUTION EPIDURAL; INFILTRATION; INTRACAUDAL; PERINEURAL AS NEEDED
Status: DISCONTINUED | OUTPATIENT
Start: 2022-06-30 | End: 2022-06-30

## 2022-06-30 RX ORDER — PROPOFOL 10 MG/ML
INJECTION, EMULSION INTRAVENOUS AS NEEDED
Status: DISCONTINUED | OUTPATIENT
Start: 2022-06-30 | End: 2022-06-30

## 2022-06-30 RX ORDER — PROPOFOL 10 MG/ML
INJECTION, EMULSION INTRAVENOUS CONTINUOUS PRN
Status: DISCONTINUED | OUTPATIENT
Start: 2022-06-30 | End: 2022-06-30

## 2022-06-30 RX ORDER — EPHEDRINE SULFATE 50 MG/ML
INJECTION INTRAVENOUS AS NEEDED
Status: DISCONTINUED | OUTPATIENT
Start: 2022-06-30 | End: 2022-06-30

## 2022-06-30 RX ADMIN — CLOPIDOGREL BISULFATE 75 MG: 75 TABLET ORAL at 08:20

## 2022-06-30 RX ADMIN — POTASSIUM CHLORIDE 40 MEQ: 1500 TABLET, EXTENDED RELEASE ORAL at 08:21

## 2022-06-30 RX ADMIN — EPHEDRINE SULFATE 5 MG: 50 INJECTION INTRAVENOUS at 11:14

## 2022-06-30 RX ADMIN — AMIODARONE HYDROCHLORIDE 400 MG: 200 TABLET ORAL at 17:54

## 2022-06-30 RX ADMIN — MULTIPLE VITAMINS W/ MINERALS TAB 1 TABLET: TAB ORAL at 08:21

## 2022-06-30 RX ADMIN — SODIUM CHLORIDE: 0.9 INJECTION, SOLUTION INTRAVENOUS at 10:33

## 2022-06-30 RX ADMIN — PROPOFOL 50 MG: 10 INJECTION, EMULSION INTRAVENOUS at 10:44

## 2022-06-30 RX ADMIN — ASPIRIN 81 MG: 81 TABLET, COATED ORAL at 08:20

## 2022-06-30 RX ADMIN — LIDOCAINE HYDROCHLORIDE 100 MG: 10 INJECTION, SOLUTION EPIDURAL; INFILTRATION; INTRACAUDAL; PERINEURAL at 10:44

## 2022-06-30 RX ADMIN — PROPOFOL 50 MCG/KG/MIN: 10 INJECTION, EMULSION INTRAVENOUS at 10:48

## 2022-06-30 RX ADMIN — AMIODARONE HYDROCHLORIDE 400 MG: 200 TABLET ORAL at 08:20

## 2022-06-30 RX ADMIN — APIXABAN 5 MG: 5 TABLET, FILM COATED ORAL at 08:20

## 2022-06-30 RX ADMIN — DIPHENHYDRAMINE HYDROCHLORIDE, ZINC ACETATE 1 APPLICATION: 2; .1 CREAM TOPICAL at 05:23

## 2022-06-30 RX ADMIN — FOLIC ACID 1 MG: 1 TABLET ORAL at 08:21

## 2022-06-30 RX ADMIN — THIAMINE HCL TAB 100 MG 100 MG: 100 TAB at 08:20

## 2022-06-30 RX ADMIN — APIXABAN 5 MG: 5 TABLET, FILM COATED ORAL at 17:54

## 2022-06-30 RX ADMIN — PROPOFOL 150 MG: 10 INJECTION, EMULSION INTRAVENOUS at 10:48

## 2022-06-30 RX ADMIN — ATORVASTATIN CALCIUM 40 MG: 40 TABLET, FILM COATED ORAL at 17:54

## 2022-06-30 NOTE — PROGRESS NOTES
Progress Note - Electrophysiology  Katelynn Mcgill 46 y o  male MRN: [id number]  Unit/Bed#: -01 Encounter: [id number]      Assessment:  1  Paroxysmal atrial fibrillation, new onset this admission  - status post morelia/cardioversion today  2  Newly diagnosed cardiomyopathy, mixed picture  - EF of 40% per 3D morelia today  - not maintained on guideline directed medical therapy of Ace or Arb yet, can consider adding tomorrow  3  Coronary artery disease   - status post complex PCI to LAD yesterday  4  Hyperlipidemia    5  Prior alcohol use    Plan:  Patient is currently in sinus rhythm status post cardioversion today  Patient has been placed on Eliquis for stroke prevention  Can continue with amiodarone, will discuss with fellow need to continue 400 mg twice daily for the time being, but can likely be decreased to 200 mg daily on maintenance dose tomorrow  Tolerating beta-blocker therapy as well  Unclear where rashes come from, patient reports that it started 3 to 4 days ago and he does not feel that it is from the bed sheets  Subjective/Objective   Subjective:  Patient feeling good today other than reporting a rash that does continued to itch that started 3 to 4 days ago  TELE:  Sinus rhythm in the 60s    Objective:  Vitals: BP 99/68   Pulse 78   Temp 97 5 °F (36 4 °C)   Resp 16   Ht 6' 2" (1 88 m)   Wt 121 kg (267 lb)   SpO2 94%   BMI 34 28 kg/m²     Vitals:    06/30/22 0329 06/30/22 1100   Weight: 121 kg (267 lb 9 6 oz) 121 kg (267 lb)     Orthostatic Blood Pressures    Flowsheet Row Most Recent Value   Blood Pressure 99/68 filed at 06/30/2022 1152   Patient Position - Orthostatic VS Lying filed at 06/29/2022 2215            Intake/Output Summary (Last 24 hours) at 6/30/2022 1444  Last data filed at 6/30/2022 1119  Gross per 24 hour   Intake 657 ml   Output --   Net 657 ml       Invasive Devices  Report    Peripheral Intravenous Line  Duration           Peripheral IV 06/27/22 Dorsal (posterior); Left Wrist 3 days                          Scheduled Meds:  Current Facility-Administered Medications   Medication Dose Route Frequency Provider Last Rate    acetaminophen  975 mg Oral Q8H PRN Hetul Bradley, DO      amiodarone  400 mg Oral BID With Meals Rochelle Gasimli-Ida, DO      apixaban  5 mg Oral BID Rochelle Gasimli-Ida, DO      aspirin  81 mg Oral Daily Hetul Bradley, DO      atorvastatin  40 mg Oral Daily With [employer], DO      clopidogrel  75 mg Oral Daily Hetul Bradley, DO      diphenhydrAMINE-zinc acetate   Topical TID PRN Hetul Bradley, DO      folic acid  1 mg Oral Daily Hetul Bradley, DO      furosemide  40 mg Oral Daily Hetul Bradley, DO      insulin lispro  1-5 Units Subcutaneous TID AC Hetul Bradley, DO      insulin lispro  1-5 Units Subcutaneous HS Hetul Bradley, DO      metoprolol  5 mg Intravenous Q6H PRN Hetul Bradley, DO      metoprolol succinate  75 mg Oral Daily Hetul Bradley, DO      multivitamin-minerals  1 tablet Oral Daily Hetul Bradley, DO      ondansetron  4 mg Intravenous Q6H PRN Hetul Bradley, DO      oxyCODONE-acetaminophen  1 tablet Oral Q4H PRN [facility], CRNP      potassium chloride  40 mEq Oral Daily Hetul Bradley, DO      thiamine  100 mg Oral Daily Hetul Bradley, DO       Continuous Infusions:   PRN Meds:   acetaminophen    diphenhydrAMINE-zinc acetate    metoprolol    ondansetron    oxyCODONE-acetaminophen    Review of Systems:  ROS  ROS as noted above, otherwise 12 point review of systems was performed and is negative  Physical Exam:   Physical Exam  Vitals and nursing note reviewed  Constitutional:       General: He is not in acute distress  Appearance: He is well-developed  He is not diaphoretic  HENT:      Head: Normocephalic and atraumatic  Eyes:      Pupils: Pupils are equal, round, and reactive to light  Neck:      Vascular: No JVD  Cardiovascular:      Rate and Rhythm: Normal rate and regular rhythm  Heart sounds: No murmur heard      No friction rub  No gallop  Pulmonary:      Effort: Pulmonary effort is normal  No respiratory distress  Breath sounds: Normal breath sounds  No wheezing  Abdominal:      Palpations: Abdomen is soft  Musculoskeletal:         General: Normal range of motion  Cervical back: Normal range of motion  Skin:     General: Skin is warm and dry  Neurological:      Mental Status: He is alert and oriented to person, place, and time  Lab Results: I have personally reviewed pertinent lab results  Results from last 7 days   Lab Units 06/30/22  0449 06/29/22  0503 06/28/22  0545   WBC Thousand/uL 7 05 7 22 7 98   HEMOGLOBIN g/dL 11 5* 11 1* 11 8*   HEMATOCRIT % 35 9* 34 6* 37 0   PLATELETS Thousands/uL 205 195 212     Results from last 7 days   Lab Units 06/30/22  0449 06/29/22  0503 06/28/22  0545   POTASSIUM mmol/L 4 0 4 0 4 2   CHLORIDE mmol/L 108 111* 106   CO2 mmol/L 23 25 24   BUN mg/dL 16 15 16   CREATININE mg/dL 1 45* 1 46* 1 46*   CALCIUM mg/dL 9 1 8 6 8 6     Results from last 7 days   Lab Units 06/29/22  0503 06/28/22  0545 06/27/22  2226 06/27/22  0559   INR   --   --   --  1 13   PTT seconds 80* 65* 82* 56*     Results from last 7 days   Lab Units 06/30/22  0449 06/25/22  0356 06/24/22  0446   MAGNESIUM mg/dL 2 5 2 3 2 3       Imaging: I have personally reviewed pertinent reports  No results found for this or any previous visit        VTE Pharmacologic Prophylaxis: ELiquis  VTE Mechanical Prophylaxis: sequential compression device

## 2022-06-30 NOTE — ASSESSMENT & PLAN NOTE
· Baseline creatinine approximately 1 1-1 2; today is 1 45  · Likely due to hypoperfusion due to afib RVR, acite CHF  · Nephrology consulted, optimized for cardiac cath  · Avoid nephrotoxins, hypotension  · Monitor on daily labs

## 2022-06-30 NOTE — ASSESSMENT & PLAN NOTE
· S/p cardiac cath 6/27 with proximal LAD 90% severely calcified stenosis (unable to advance IVUS through lesion), mid circumflex discrete 30% stenosis and mid RCA discrete 755-80% stenosis  · Transferred to B for revascularization of prox LAD with atherectomy  · Pt received loading dose of Plavix  · Continue Plavix, asa, Lipitor and metoprolol  · Continue heparin drip  · 2 x PCI on 06/29; followed by Cardiology and will continue DA PT therapy with statin  As per cardiology note; DA PT therapy for 3 weeks, followed by Plavix and statin alone

## 2022-06-30 NOTE — PROGRESS NOTES
Cardiology Progress Note - Efraín Akhtar 46 y o  male MRN: [id number]    Unit/Bed#: -01 Encounter: [id number]      Assessment:  Principal Problem:    Atrial fibrillation ([facility] [address] )  Active Problems:    Hypertension    Alcohol abuse    VASILE (acute kidney injury) ([facility] [address] )    PNA (pneumonia)    Acute systolic heart failure (HCC)    Coronary artery disease    Contact dermatitis    Pre-diabetes      Plan:  Patient with no issue overnight  He has no chest pain or significant dyspnea  Telemetry demonstrates atrial fibrillation with a moderate ventricular response  He had successful two vessel PCI yesterday  Scheduled today for MIKO cardioversion with amiodarone on board  BMP today with potassium of 4 0 and creatinine of 1 45  Hemoglobin 11 5  Patient will be on triple blood thinning therapy for three weeks and thereafter Eliquis and clopidogrel  Subjective:   Patient seen and examined  No significant events overnight   negative  Objective:     Vitals: Blood pressure 101/74, pulse 99, temperature 97 8 °F (36 6 °C), resp  rate 16, height 6' 2" (1 88 m), weight 121 kg (267 lb 9 6 oz), SpO2 96 %  , Body mass index is 34 36 kg/m² ,   Orthostatic Blood Pressures    Flowsheet Row Most Recent Value   Blood Pressure 101/74 filed at 06/30/2022 0707   Patient Position - Orthostatic VS Lying filed at 06/29/2022 2215      ,      Intake/Output Summary (Last 24 hours) at 6/30/2022 0836  Last data filed at 6/29/2022 1355  Gross per 24 hour   Intake 1537 1 ml   Output 10 ml   Net 1527 1 ml       No significant arrhythmias seen on telemetry review    Atrial fibrillation with moderate ventricular response      Physical Exam:    GEN: Efraín Akhtar appears well, alert and oriented x 3, pleasant and cooperative   NECK: supple, no carotid bruits, no JVD or HJR  HEART: normal rate, regular rhythm, normal S1 and S2, no murmurs, clicks, gallops or rubs   LUNGS: clear to auscultation bilaterally; no wheezes, rales, or rhonchi   ABDOMEN: normal bowel sounds, soft, no tenderness, no distention  EXTREMITIES: peripheral pulses normal; no clubbing, cyanosis, or edema  SKIN: warm and well perfused, no suspicious lesions on exposed skin    Labs & Results:    Admission on 06/28/2022   Component Date Value    POC Glucose 06/28/2022 108     PTT 06/29/2022 80 (A)    Sodium 06/29/2022 141     Potassium 06/29/2022 4 0     Chloride 06/29/2022 111 (A)    CO2 06/29/2022 25     ANION GAP 06/29/2022 5     BUN 06/29/2022 15     Creatinine 06/29/2022 1 46 (A)    Glucose 06/29/2022 104     Calcium 06/29/2022 8 6     eGFR 06/29/2022 54     WBC 06/29/2022 7 22     RBC 06/29/2022 3 65 (A)    Hemoglobin 06/29/2022 11 1 (A)    Hematocrit 06/29/2022 34 6 (A)    MCV 06/29/2022 95     MCH 06/29/2022 30 4     MCHC 06/29/2022 32 1     RDW 06/29/2022 13 3     Platelets 84/22/1445 195     MPV 06/29/2022 12 5     Ventricular Rate 06/28/2022 98     Atrial Rate 06/28/2022 141     QRSD Interval 06/28/2022 92     QT Interval 06/28/2022 376     QTC Interval 06/28/2022 480     QRS Axis 06/28/2022 50     T Wave Axis 06/28/2022 89     POC Glucose 06/29/2022 115     Ventricular Rate 06/29/2022 96     Atrial Rate 06/29/2022 88     QRSD Interval 06/29/2022 86     QT Interval 06/29/2022 350     QTC Interval 06/29/2022 442     QRS Axis 06/29/2022 64     T Wave Axis 06/29/2022 97     POC Glucose 06/29/2022 100     Activated Clotting Time,* 06/29/2022 276 (A)    Specimen Type 06/29/2022 VENOUS     Ventricular Rate 06/29/2022 95     Atrial Rate 06/29/2022 65     QRSD Interval 06/29/2022 82     QT Interval 06/29/2022 378     QTC Interval 06/29/2022 475     QRS Axis 06/29/2022 45     T Wave Axis 06/29/2022 72     POC Glucose 06/29/2022 112     POC Glucose 06/29/2022 119     POC Glucose 06/29/2022 133     WBC 06/30/2022 7 05     RBC 06/30/2022 3 80 (A)    Hemoglobin 06/30/2022 11 5 (A)    Hematocrit 06/30/2022 35 9 (A)    MCV 06/30/2022 95     MCH 06/30/2022 30 3     MCHC 06/30/2022 32 0     RDW 06/30/2022 13 3     MPV 06/30/2022 12 4     Platelets 84/76/7151 205     Magnesium 06/30/2022 2 5     Phosphorus 06/30/2022 4 7 (A)    Sodium 06/30/2022 140     Potassium 06/30/2022 4 0     Chloride 06/30/2022 108     CO2 06/30/2022 23     ANION GAP 06/30/2022 9     BUN 06/30/2022 16     Creatinine 06/30/2022 1 45 (A)    Glucose 06/30/2022 98     Calcium 06/30/2022 9 1     eGFR 06/30/2022 55     Segmented % 06/30/2022 73     Lymphocytes % 06/30/2022 16     Monocytes % 06/30/2022 7     Eosinophils, % 06/30/2022 3     Basophils % 06/30/2022 0     Myelocytes % 06/30/2022 1     Absolute Neutrophils 06/30/2022 5 15     Lymphocytes Absolute 06/30/2022 1 13     Monocytes Absolute 06/30/2022 0 49     Eosinophils Absolute 06/30/2022 0 21     Basophils Absolute 06/30/2022 0 00     RBC Morphology 06/30/2022 Present     Polychromasia 06/30/2022 Present     Platelet Estimate 01/36/1801 Adequate        CT abdomen pelvis wo contrast    Result Date: 6/22/2022  Narrative: CT ABDOMEN AND PELVIS WITHOUT IV CONTRAST INDICATION:   Abdominal pain, acute, nonlocalized Worsening abdominal pain, pressor requirement, no significant findings on personal interpretation of KUB/CXR  COMPARISON:  CT chest 6/22/2022 TECHNIQUE:  CT examination of the abdomen and pelvis was performed without intravenous contrast  This examination was performed without intravenous contrast in the context of the critical nationwide Omnipaque shortage  Axial, sagittal, and coronal 2D reformatted images were created from the source data and submitted for interpretation  Radiation dose length product (DLP) for this visit:  1140 mGy-cm   This examination, like all CT scans performed in the [facility], was performed utilizing techniques to minimize radiation dose exposure, including the use of iterative reconstruction and automated exposure control   Enteric contrast was not administered  FINDINGS: ABDOMEN Mild motion artifact  LOWER CHEST:  Small bilateral pleural effusions with minimal basilar subsegmental atelectasis, right greater than left  Mild bibasilar smooth septal thickening  Partially visualized mild mediastinal and right hilar adenopathy  Coronary artery calcification  LIVER/BILIARY TREE:  Unremarkable  GALLBLADDER:  No calcified gallstones  Potential contracted gallbladder with moderate gallbladder wall thickening  No pericholecystic inflammatory changes  SPLEEN:  Unremarkable  PANCREAS:  Diffuse atrophy and fatty infiltration of the pancreas  ADRENAL GLANDS:  Unremarkable  KIDNEYS/URETERS:  Excreted contrast in bilateral renal collecting systems limits evaluation for renal calculi  No hydronephrosis or perinephric collection  STOMACH AND BOWEL:  Sigmoid diverticulosis without diverticulitis  No bowel obstruction  APPENDIX:  A normal appendix was visualized  ABDOMINOPELVIC CAVITY:  No ascites  No pneumoperitoneum  No lymphadenopathy  VESSELS:  Aortoiliac calcification  No aneurysm  PELVIS REPRODUCTIVE ORGANS:  Unremarkable for patient's age  URINARY BLADDER:  Excreted contrast in the bladder  Otherwise unremarkable  ABDOMINAL WALL/INGUINAL REGIONS:  Small fat-containing umbilical hernia  Tiny bilateral inguinal fat-containing hernias  OSSEOUS STRUCTURES:  No acute fracture or osseous destructive lesion identified  Degenerative changes of the spine, pubic symphysis, and multiple joints  Impression: Stable findings of the lung bases including partially visualized mild mediastinal adenopathy  Noncontrast chest CT follow-up in 3 months is advised  No definitive evidence of acute abdominopelvic process allowing for mild motion artifact  Gallbladder wall thickening potentially due to CHF  No definitive calcified gallstones or pericholecystic inflammatory changes  Sigmoid diverticulosis   Workstation performed: [id number]     XR chest 1 view portable    Result Date: 6/22/2022  Narrative: CHEST INDICATION:   tachycardia  COMPARISON:  1/28/2015 EXAM PERFORMED/VIEWS:  XR CHEST PORTABLE Images: 2 FINDINGS: Cardiomediastinal silhouette appears unremarkable  The lungs are clear  No pneumothorax or pleural effusion  Osseous structures appear within normal limits for patient age  Impression: No acute cardiopulmonary disease  Findings are stable Workstation performed: [id number]     XR abdomen 1 view kub    Result Date: 6/22/2022  Narrative: ABDOMEN INDICATION:   Abdominal pain and distention with hypotension  COMPARISON:  None VIEWS:  AP supine Images: 4 FINDINGS: There is a nonobstructive bowel gas pattern  No discernible free air on this supine study  Upright or left lateral decubitus imaging is more sensitive to detect subtle free air in the appropriate setting  No pathologic calcifications or soft tissue masses  Visualized lung bases are clear  Visualized osseous structures are unremarkable for the patient's age  Contrast visualized within the bladder related to recent CTA     Impression: Unremarkable abdomen  Workstation performed: [id number]     Cardiac catheterization    Result Date: 6/29/2022  Narrative: · Mid RCA lesion is 80% stenosed  · Prox LAD lesion is 90% stenosed  2v CAD     Cardiac catheterization    Result Date: 6/27/2022  Narrative: · Mid LM lesion is 90% stenosed  Proximal LAD 90% severely calcified stenosis (unable to advanced IVUS through lesion) Mid circumflex discrete 30% stenosis Mid RCA discrete 75-80% stenosis     XR chest portable ICU    Result Date: 6/22/2022  Narrative: CHEST INDICATION:   Dyspnea, increasing abdominal pain, hemodynamic instability  COMPARISON:  6/21/2022 EXAM PERFORMED/VIEWS:  XR CHEST PORTABLE ICU Images: 3 FINDINGS: Cardiomediastinal silhouette appears unremarkable  The lungs are clear  No pneumothorax or pleural effusion  Osseous structures appear within normal limits for patient age       Impression: No acute cardiopulmonary disease  Findings are stable Workstation performed: [id number]     US right upper quadrant    Result Date: 6/23/2022  Narrative: RIGHT UPPER QUADRANT ULTRASOUND INDICATION:     transaminitis  COMPARISON:  CT abdomen pelvis 6/22/2022  TECHNIQUE:   Real-time ultrasound of the right upper quadrant was performed with a curvilinear transducer with both volumetric sweeps and still imaging techniques  FINDINGS: PANCREAS:  Portions of the pancreas are obscured by bowel gas  Visualized portions of the pancreas are grossly unremarkable  AORTA AND IVC:  Visualized portions are normal for patient age  LIVER: Size:  Within normal range  The liver measures 17 9 cm in the midclavicular line  Contour:  Surface contour is smooth  Parenchyma:  Echogenicity and echotexture are within normal limits  No liver mass identified  Limited imaging of the main portal vein shows it to be patent and hepatopetal   There is mild pulsatility of the portal vein  BILIARY: No gallbladder findings  No intrahepatic biliary dilatation  CBD measures 5 0 mm  No evidence of choledocholithiasis within the visualized portions of the common bile duct  KIDNEY: Right kidney measures 10 5 x 5 6 x 6 4 cm  Volume 195 3 mL Kidney within normal limits  ASCITES:   None  Impression: 1  Mild pulsatility of the portal vein  This is a nonspecific finding which can be seen secondary to a variety of etiologies including congestive heart failure or hepatocellular disease  Otherwise unremarkable right upper quadrant ultrasound  Workstation performed: [id number]     CTA ED chest PE Study    Result Date: 6/22/2022  Narrative: CTA - CHEST WITH IV CONTRAST - PULMONARY ANGIOGRAM INDICATION:   tachycarida, tachypnea, elevated dimer  COMPARISON: None  TECHNIQUE: CTA examination of the chest was performed using angiographic technique according to a protocol specifically tailored to evaluate for pulmonary embolism    Axial, sagittal, and coronal 2D reformatted images were created from the source data and  submitted for interpretation  In addition, coronal 3D MIP postprocessing was performed on the acquisition scanner  Radiation dose length product (DLP) for this visit:  643 mGy-cm   This examination, like all CT scans performed in the [facility], was performed utilizing techniques to minimize radiation dose exposure, including the use of iterative reconstruction and automated exposure control  IV Contrast:  70 mL of iohexol (OMNIPAQUE)  FINDINGS: PULMONARY ARTERIAL TREE:  No pulmonary embolus is seen  LUNGS:  Patchy groundglass airspace disease throughout the right upper lobe  Diffuse interlobar septal thickening  Mild bibasilar atelectasis  There is no tracheal or endobronchial lesion  PLEURA:  Small right greater than left pleural effusions  HEART/GREAT VESSELS:  Unremarkable for patient's age  No thoracic aortic aneurysm  MEDIASTINUM AND WILLAM:  Unremarkable  CHEST WALL AND LOWER NECK:   Unremarkable  VISUALIZED STRUCTURES IN THE UPPER ABDOMEN:  Unremarkable  OSSEOUS STRUCTURES:  No acute fracture or destructive osseous lesion  Impression: Patchy groundglass airspace disease throughout the right upper lobe consistent with acute infiltrate, likely infectious or inflammatory etiology Findings consistent with CHF  Workstation performed: [id number]     Cardioversion    Result Date: 6/23/2022  Narrative: Indication: Symptomatic atrial fibrillation with left ventricular systolic dysfunction Anti-coagulation: Heparin gtt Anesthesia: Conscious sedation provided by anesthesiology Procedure: MIKO and CV procedures including risks, benefits and alternatives were explained to the patient  Questions were answered  Informed consent was obtained  Patient was moved to the GI lab  MAC was given by anesthetist   MIKO probe was passed in without any difficulty  Images were obtained  MIKO report is dictated separately  Probe was withdrawn without any problem  No complications were encountered  MIKO findings:  No intracardiac thrombus  Reduced LV systolic function  Electrical Pad Placement: Anteroposteriorly interscapular region and upper sternum Failed cardioversion despite 3 synchronized biphasic shocks at 200 -> 300 -> 300 J while pt was on Amiodarone drip  Complications: None Disposition: Cath lab recovery area  Once criteria are met patient can be transferred to ICU room  Echo complete w/ contrast if indicated    Result Date: 6/22/2022  N[facility]ative: [facility]  Left Ventricle: Left ventricular cavity size is dilated  Wall thickness is normal  Systolic function is moderately reduced (35%)  There is moderate global hypokinesis with regional variation (hypokinesis of anterior wall)  Diastolic function is normal    Right Ventricle: Right ventricular cavity size is upper normal  Systolic function is normal    Left Atrium: The atrium is mildly dilated    Right Atrium: The atrium is mildly dilated    Aortic Valve: There is trace regurgitation    Mitral Valve: There is moderate to severe regurgitation    Tricuspid Valve: There is mild to moderate regurgitation  The right ventricular systolic pressure is mildly elevated (53 mm Hg)    Pulmonic Valve: There is trace regurgitation    IVC/SVC: The inferior vena cava is dilated  Respirophasic changes were blunted (less than 50% variation)  MIKO    Result Date: 6/23/2022  Narrative: [facility]  Left Ventricle: Left ventricular cavity size is dilated  Wall thickness is normal  Systolic function is moderately reduced - 35%  There is moderate global hypokinesis with regional variation  There was spontaneous echo contrast (smoke) seen in the LV    Left Atrium: The atrium is mildly dilated  There is no thrombus  There is mild, continuous spontaneous echo contrast    Right Atrium: The atrium is mildly dilated    Atrial Septum: No patent foramen ovale confirmed at rest by color flow Doppler    Left Atrial Appendage:  There is normal function  There is no thrombus    Mitral Valve: There is moderate regurgitation    Tricuspid Valve: There is mild regurgitation  EKG personally reviewed by Sherine Pena MD      Counseling / Coordination of Care  Total floor / unit time spent today 30 minutes  Greater than 50% of total time was spent with the patient and / or family counseling and / or coordination of care

## 2022-06-30 NOTE — ANESTHESIA PREPROCEDURE EVALUATION
Procedure:  MIKO  CARDIOVERSION    Relevant Problems   CARDIO   (+) Atrial fibrillation (HCC)   (+) CHF (congestive heart failure) (HCC)   (+) Coronary artery disease   (+) Hyperlipidemia   (+) Hypertension      /RENAL   (+) VASILE (acute kidney injury) ([facility] )      NEURO/PSYCH   (+) Depression with anxiety      PULMONARY   (+) PNA (pneumonia)   (+) Smoker    Mid RCA PCI and prox LAD PCI on 6/29    Physical Exam    Airway    Mallampati score: III  TM Distance: >3 FB  Neck ROM: full     Dental   No notable dental hx     Cardiovascular  Rhythm: irregular, Rate: normal, Murmur, Cardiovascular exam normal    Pulmonary  Pulmonary exam normal     Other Findings        Anesthesia Plan  ASA Score- 3     Anesthesia Type- IV sedation with anesthesia with ASA Monitors  Additional Monitors:   Airway Plan:           Plan Factors-Exercise tolerance (METS): >4 METS  Chart reviewed  EKG reviewed  Imaging results reviewed  Existing labs reviewed  Patient summary reviewed  Patient is not a current smoker  Induction-     Postoperative Plan-     Informed Consent- Anesthetic plan and risks discussed with patient  I personally reviewed this patient with the CRNA  Discussed and agreed on the Anesthesia Plan with the CRNA  Vicente Lomeli

## 2022-06-30 NOTE — CASE MANAGEMENT
Case Management Discharge Planning Note    Patient name Pamela Esparza  Location [address] [phone] MRN [id number]  : 1970 Date 2022       Current Admission Date: 2022  Current Admission Diagnosis:Atrial fibrillation [facility])   Patient Active Problem List    Diagnosis Date Noted    Coronary artery disease 2022    Contact dermatitis 2022    Pre-diabetes     [facility] ) 2022    Morbid obesity ([facility] ) 2022    Hyperlipidemia 2022    CHF (congestive heart failure) ([facility] ) 2022    VASILE (acute kidney injury) ([place] [address] ) 2022    PNA (pneumonia) 2022    Atrial fibrillation ([place] [address] ) 2022    Chronic cough 2021    Smoker 2020    Hypertension 2020    Alcohol abuse 2020    Psoriasis 2019    Hydradenitis 2018    Carpal tunnel syndrome 2014    Depression with anxiety 2014      LOS (days): 2  Geometric Mean LOS (GMLOS) (days):   Days to GMLOS:     OBJECTIVE:  Risk of Unplanned Readmission Score: 11 93         Current admission status: Inpatient   Preferred Pharmacy:   [facility] [id number] [facility], [facility] [place] [address]  Phone: [phone] Fax: [phone]    Primary Care Provider: Cadence Patel MD    Primary Insurance: BLUE CROSS  Secondary Insurance:     DISCHARGE DETAILS:                                          Other Referral/Resources/Interventions Provided:  Referral Comments: Patient transitioned to eliquis, copay savings card provided to patient

## 2022-06-30 NOTE — ASSESSMENT & PLAN NOTE
Wt Readings from Last 3 Encounters:   06/30/22 121 kg (267 lb)   06/28/22 124 kg (272 lb 4 3 oz)   11/16/21 124 kg (274 lb)     · Likely brought on by afib  · MIKO 6/23: EF 35% with dilated LV and moderate global hypokinesis, moderate MR and mild TR  · Cath as noted above  · Appears to be euvolemic, continue lasix 40mg PO daily  · Monitor volume status with IVF

## 2022-06-30 NOTE — QUICK NOTE
Right wrist ( catheterization access site) is evaluated, no hematoma, no brusing, radial pulse +2  Patient still in atrial fibrillation, pending MIKO/cardioversion  Will start Apixaban 5 mg bid from this morning

## 2022-06-30 NOTE — ASSESSMENT & PLAN NOTE
· New onset afib with RVR s/p failed DCCV on 6/23, now on amiodarone  · Continue lopressor for rate control  · Cardiology following, planned to reattempt cardioversion vs ablation vs EP eval pending cath results  · Continue IV heparin drip with eventual transition to [address]  · 6/30-patient underwent MIKO and cardioversion successfully a with EP today; heparin drip stopped, started on Eliquis  Currently on amiodarone with good rate control

## 2022-06-30 NOTE — PROGRESS NOTES
[address] [facility]  Progress Note - Tang Ma 1970, 46 y o  male MRN: [id number]  Unit/Bed#: -01 Encounter: [id number]  Primary Care Provider: Hardeep Armendariz MD   Date and time admitted to hospital: 6/28/2022  9:37 PM    Pre-diabetes  Assessment & Plan  · A1c 5 9  · SSI, hypoglycemia protocol    Contact dermatitis  Assessment & Plan  · Likely due to bed sheets, on regions of direct contact to the bed  · PRN benadryl    Coronary artery disease  Assessment & Plan  · S/p cardiac cath 6/27 with proximal LAD 90% severely calcified stenosis (unable to advance IVUS through lesion), mid circumflex discrete 30% stenosis and mid RCA discrete 755-80% stenosis  · Transferred to [facility] for revascularization of prox LAD with atherectomy  · Pt received loading dose of Plavix  · Continue Plavix, asa, Lipitor and metoprolol  · Continue heparin drip  · 2 x PCI on 06/29; followed by Cardiology and will continue DA PT therapy with statin  As per cardiology note; DA PT therapy for 3 weeks, followed by Plavix and statin alone      Acute systolic heart failure (HCC)  Assessment & Plan  Wt Readings from Last 3 Encounters:   06/30/22 121 kg (267 lb)   06/28/22 124 kg (272 lb 4 3 oz)   11/16/21 124 kg (274 lb)     · Likely brought on by afib  · MIKO 6/23: EF 35% with dilated LV and moderate global hypokinesis, moderate MR and mild TR  · Cath as noted above  · Appears to be euvolemic, continue lasix 40mg PO daily  · Monitor volume status with IVF        PNA (pneumonia)  Assessment & Plan  · CTA chest 6/22: "patchy groundglass patchy airspace disease throughout RUL consistent with acute infiltrate"   · S/p 4 days of IV ceftriaxone and 1 day azithromycin, completed antibiotics on PO vantin with last dose 6/29    VASILE (acute kidney injury) ([facility] [address] )  Assessment & Plan  · Baseline creatinine approximately 1 1-1 2; today is 1 45  · Likely due to hypoperfusion due to afib RVR, acite CHF  · Nephrology consulted, optimized for cardiac cath  · Avoid nephrotoxins, hypotension  · Monitor on daily labs    Alcohol abuse  Assessment & Plan  · CIWA dc'd, out of withdrawal window  · Continue thiamine, folic acid, multivitamin    Hypertension  Assessment & Plan  · BP WNL    * Atrial fibrillation ([address] )  Assessment & Plan  · New onset afib with RVR s/p failed DCCV on 6/23, now on amiodarone  · Continue lopressor for rate control  · Cardiology following, planned to reattempt cardioversion vs ablation vs EP eval pending cath results  · Continue IV heparin drip with eventual transition to [address]  · 6/30-patient underwent MIKO and cardioversion successfully a with EP today; heparin drip stopped, started on Eliquis  Currently on amiodarone with good rate control  VTE Pharmacologic Prophylaxis:   Pharmacologic: Apixaban (Eliquis)  Mechanical VTE Prophylaxis in Place: Yes    Patient Centered Rounds: I have performed bedside rounds with nursing staff today  Discussions with Specialists or Other Care Team Provider:     Education and Discussions with Family / Patient: Care plan discussed with patient who voiced understanding and agrees with recommendations  Time Spent for Care: 30 minutes  More than 50% of total time spent on counseling and coordination of care as described above  Current Length of Stay: 2 day(s)    Current Patient Status: Inpatient   Certification Statement: The patient will continue to require additional inpatient hospital stay due to Treatment of AFib RVR and coronary artery disease    Discharge Plan: To be determined, discharge in 24-48 hours likely    Code Status: Level 1 - Full Code      Subjective:   Patient seen examined bedside, no acute distress or discomfort noted  Patient denies chest pain or shortness of breath  Underwent MIKO and cardioversion today successfully; continue amiodarone and consider decreasing to 200 mg daily as per EP tomorrow     Currently on DA PT therapy for 3 weeks as per Cardiology; afterwards will continue statin along with Plavix alone  Given EF of 40%; may need goal-directed therapy; defer to Cardiology at this time  On beta-blocker, statin, aspirin; may benefit from acei/Arb moving forward  Discussed importance of smoking cessation and alcohol moderation  Objective:     Vitals:   Temp (24hrs), Av 6 °F (36 4 °C), Min:97 3 °F (36 3 °C), Max:97 8 °F (36 6 °C)    Temp:  [97 3 °F (36 3 °C)-97 8 °F (36 6 °C)] 97 6 °F (36 4 °C)  HR:  [] 85  Resp:  [16-18] 16  BP: ()/(64-80) 115/75  SpO2:  [94 %-96 %] 94 %  Body mass index is 34 28 kg/m²  Input and Output Summary (last 24 hours): Intake/Output Summary (Last 24 hours) at 2022 1736  Last data filed at 2022 1512  Gross per 24 hour   Intake 657 ml   Output 0 ml   Net 657 ml       Physical Exam:     Physical Exam      Additional Data:     Labs:    Results from last 7 days   Lab Units 22  0449 22  0545 22  0559   WBC Thousand/uL 7 05   < > 7 84   HEMOGLOBIN g/dL 11 5*   < > 11 8*   HEMATOCRIT % 35 9*   < > 36 6   PLATELETS Thousands/uL 205   < > 225   NEUTROS PCT %  --   --  62   LYMPHS PCT %  --   --  24   LYMPHO PCT % 16  --   --    MONOS PCT %  --   --  9   MONO PCT % 7  --   --    EOS PCT % 3  --  3    < > = values in this interval not displayed  Results from last 7 days   Lab Units 22  0449 22  0356 22  0446   SODIUM mmol/L 140   < > 141   POTASSIUM mmol/L 4 0   < > 3 6   CHLORIDE mmol/L 108   < > 103   CO2 mmol/L 23   < > 28   BUN mg/dL 16   < > 20   CREATININE mg/dL 1 45*   < > 1 55*   ANION GAP mmol/L 9   < > 10   CALCIUM mg/dL 9 1   < > 8 7   ALBUMIN g/dL  --   --  3 2*   TOTAL BILIRUBIN mg/dL  --   --  0 37   ALK PHOS U/L  --   --  65   ALT U/L  --   --  105*   AST U/L  --   --  40   GLUCOSE RANDOM mg/dL 98   < > 107    < > = values in this interval not displayed       Results from last 7 days   Lab Units 22  0559   INR  1 13     Results from last 7 days   Lab Units 06/30/22  1717 06/29/22  2055 06/29/22  1650 06/29/22  1402 06/29/22  1100 06/29/22  0549 06/28/22  2158 06/28/22  2056 06/28/22  1549 06/28/22  1033 06/28/22  0548 06/27/22  2048   POC GLUCOSE mg/dl 133 133 119 112 100 115 108 105 126 126 119 129         Results from last 7 days   Lab Units 06/24/22  0446   PROCALCITONIN ng/ml 0 24           * I Have Reviewed All Lab Data Listed Above  * Additional Pertinent Lab Tests Reviewed:  All Labs Within Last 24 Hours Reviewed    Imaging:    Imaging Reports Reviewed Today Include:  Left heart catheterization  Imaging Personally Reviewed by Myself Includes:      Recent Cultures (last 7 days):           Last 24 Hours Medication List:   Current Facility-Administered Medications   Medication Dose Route Frequency Provider Last Rate    acetaminophen  975 mg Oral Q8H PRN Hetul Bradley, DO      amiodarone  400 mg Oral BID With Meals Rochelle Gasimli-Ida, DO      apixaban  5 mg Oral BID Rochelle Gasimli-Ida, DO      aspirin  81 mg Oral Daily Hetul Bradley, DO      atorvastatin  40 mg Oral Daily With [employer], DO      clopidogrel  75 mg Oral Daily Hetul Bradley, DO      diphenhydrAMINE-zinc acetate   Topical TID PRN Hetul Bradley, DO      folic acid  1 mg Oral Daily Hetul Bradley, DO      furosemide  40 mg Oral Daily Hetul Bradley, DO      insulin lispro  1-5 Units Subcutaneous TID AC Hetul Bradley, DO      insulin lispro  1-5 Units Subcutaneous HS Hetul Bradley, DO      metoprolol  5 mg Intravenous Q6H PRN Hetul Bradley, DO      metoprolol succinate  75 mg Oral Daily Hetul Bradley, DO      multivitamin-minerals  1 tablet Oral Daily Hetul Bradley, DO      ondansetron  4 mg Intravenous Q6H PRN Hetul Bradley, DO      oxyCODONE-acetaminophen  1 tablet Oral Q4H PRN JEROD Rivera      potassium chloride  40 mEq Oral Daily Hetul Bradley, DO      thiamine  100 mg Oral Daily Hetul Bradley, DO          Today, Patient Was Seen By: Elias Chance MD    ** Please Note: Dictation voice to text software may have been used in the creation of this document   **

## 2022-06-30 NOTE — ANESTHESIA POSTPROCEDURE EVALUATION
Post-Op Assessment Note    CV Status:  Stable  Pain Score: 0    Pain management: adequate     Mental Status:  Alert and awake   Hydration Status:  Euvolemic   PONV Controlled:  Controlled   Airway Patency:  Patent      Post Op Vitals Reviewed: Yes      Staff: CRNA, Anesthesiologist   Comments: HOB elevated, nonobstructing airway on room air, aox3, no complaints, VSS        No complications documented      BP   101/53   Temp      Pulse   73   Resp   12   SpO2   94% RA

## 2022-07-01 VITALS
DIASTOLIC BLOOD PRESSURE: 68 MMHG | HEART RATE: 72 BPM | BODY MASS INDEX: 34.23 KG/M2 | RESPIRATION RATE: 14 BRPM | SYSTOLIC BLOOD PRESSURE: 101 MMHG | HEIGHT: 74 IN | OXYGEN SATURATION: 96 % | TEMPERATURE: 98 F | WEIGHT: 266.76 LBS

## 2022-07-01 PROBLEM — R74.01 TRANSAMINITIS: Status: ACTIVE | Noted: 2022-07-01

## 2022-07-01 LAB
ALBUMIN SERPL BCP-MCNC: 3.1 G/DL (ref 3.5–5)
ALP SERPL-CCNC: 66 U/L (ref 46–116)
ALT SERPL W P-5'-P-CCNC: 115 U/L (ref 12–78)
ANION GAP SERPL CALCULATED.3IONS-SCNC: 6 MMOL/L (ref 4–13)
AST SERPL W P-5'-P-CCNC: 49 U/L (ref 5–45)
BILIRUB SERPL-MCNC: 1.34 MG/DL (ref 0.2–1)
BUN SERPL-MCNC: 16 MG/DL (ref 5–25)
CALCIUM ALBUM COR SERPL-MCNC: 9.6 MG/DL (ref 8.3–10.1)
CALCIUM SERPL-MCNC: 8.9 MG/DL (ref 8.3–10.1)
CHLORIDE SERPL-SCNC: 109 MMOL/L (ref 100–108)
CO2 SERPL-SCNC: 24 MMOL/L (ref 21–32)
CREAT SERPL-MCNC: 1.47 MG/DL (ref 0.6–1.3)
ERYTHROCYTE [DISTWIDTH] IN BLOOD BY AUTOMATED COUNT: 13.6 % (ref 11.6–15.1)
GFR SERPL CREATININE-BSD FRML MDRD: 54 ML/MIN/1.73SQ M
GLUCOSE SERPL-MCNC: 100 MG/DL (ref 65–140)
GLUCOSE SERPL-MCNC: 102 MG/DL (ref 65–140)
GLUCOSE SERPL-MCNC: 109 MG/DL (ref 65–140)
HCT VFR BLD AUTO: 35.8 % (ref 36.5–49.3)
HGB BLD-MCNC: 11.3 G/DL (ref 12–17)
MAGNESIUM SERPL-MCNC: 2.5 MG/DL (ref 1.6–2.6)
MCH RBC QN AUTO: 29.7 PG (ref 26.8–34.3)
MCHC RBC AUTO-ENTMCNC: 31.6 G/DL (ref 31.4–37.4)
MCV RBC AUTO: 94 FL (ref 82–98)
PHOSPHATE SERPL-MCNC: 3.8 MG/DL (ref 2.7–4.5)
PLATELET # BLD AUTO: 221 THOUSANDS/UL (ref 149–390)
PMV BLD AUTO: 12.7 FL (ref 8.9–12.7)
POTASSIUM SERPL-SCNC: 4.4 MMOL/L (ref 3.5–5.3)
PROT SERPL-MCNC: 7.3 G/DL (ref 6.4–8.2)
RBC # BLD AUTO: 3.81 MILLION/UL (ref 3.88–5.62)
SODIUM SERPL-SCNC: 139 MMOL/L (ref 136–145)
WBC # BLD AUTO: 7.65 THOUSAND/UL (ref 4.31–10.16)

## 2022-07-01 PROCEDURE — 82948 REAGENT STRIP/BLOOD GLUCOSE: CPT

## 2022-07-01 PROCEDURE — 83735 ASSAY OF MAGNESIUM: CPT | Performed by: INTERNAL MEDICINE

## 2022-07-01 PROCEDURE — 80053 COMPREHEN METABOLIC PANEL: CPT | Performed by: INTERNAL MEDICINE

## 2022-07-01 PROCEDURE — 84100 ASSAY OF PHOSPHORUS: CPT | Performed by: INTERNAL MEDICINE

## 2022-07-01 PROCEDURE — 99232 SBSQ HOSP IP/OBS MODERATE 35: CPT | Performed by: INTERNAL MEDICINE

## 2022-07-01 PROCEDURE — 85027 COMPLETE CBC AUTOMATED: CPT | Performed by: INTERNAL MEDICINE

## 2022-07-01 RX ORDER — ATORVASTATIN CALCIUM 40 MG/1
40 TABLET, FILM COATED ORAL
Qty: 30 TABLET | Refills: 0 | Status: SHIPPED | OUTPATIENT
Start: 2022-07-01 | End: 2022-07-29 | Stop reason: SDUPTHER

## 2022-07-01 RX ORDER — PREDNISONE 20 MG/1
20 TABLET ORAL DAILY
Status: DISCONTINUED | OUTPATIENT
Start: 2022-07-01 | End: 2022-07-01 | Stop reason: HOSPADM

## 2022-07-01 RX ORDER — PREDNISONE 20 MG/1
20 TABLET ORAL DAILY
Qty: 4 TABLET | Refills: 0 | Status: SHIPPED | OUTPATIENT
Start: 2022-07-02 | End: 2022-07-07

## 2022-07-01 RX ORDER — CLOPIDOGREL BISULFATE 75 MG/1
75 TABLET ORAL DAILY
Qty: 30 TABLET | Refills: 0 | Status: SHIPPED | OUTPATIENT
Start: 2022-07-02 | End: 2022-07-29 | Stop reason: SDUPTHER

## 2022-07-01 RX ORDER — METOPROLOL SUCCINATE 50 MG/1
50 TABLET, EXTENDED RELEASE ORAL 2 TIMES DAILY
Qty: 60 TABLET | Refills: 0 | Status: SHIPPED | OUTPATIENT
Start: 2022-07-01 | End: 2022-07-29 | Stop reason: SDUPTHER

## 2022-07-01 RX ORDER — FUROSEMIDE 40 MG/1
40 TABLET ORAL
Status: DISCONTINUED | OUTPATIENT
Start: 2022-07-01 | End: 2022-07-01 | Stop reason: HOSPADM

## 2022-07-01 RX ORDER — DIPHENHYDRAMINE HYDROCHLORIDE 50 MG/ML
25 INJECTION INTRAMUSCULAR; INTRAVENOUS ONCE
Status: DISCONTINUED | OUTPATIENT
Start: 2022-07-01 | End: 2022-07-01 | Stop reason: HOSPADM

## 2022-07-01 RX ORDER — METOPROLOL SUCCINATE 50 MG/1
50 TABLET, EXTENDED RELEASE ORAL 2 TIMES DAILY
Status: DISCONTINUED | OUTPATIENT
Start: 2022-07-01 | End: 2022-07-01 | Stop reason: HOSPADM

## 2022-07-01 RX ORDER — LISINOPRIL 5 MG/1
5 TABLET ORAL DAILY
Status: DISCONTINUED | OUTPATIENT
Start: 2022-07-01 | End: 2022-07-01 | Stop reason: HOSPADM

## 2022-07-01 RX ORDER — FUROSEMIDE 40 MG/1
TABLET ORAL
Qty: 38 TABLET | Refills: 0 | Status: SHIPPED | OUTPATIENT
Start: 2022-07-02 | End: 2022-07-19 | Stop reason: SDUPTHER

## 2022-07-01 RX ORDER — ASPIRIN 81 MG/1
81 TABLET ORAL DAILY
Qty: 90 TABLET | Refills: 0 | Status: SHIPPED | OUTPATIENT
Start: 2022-07-02 | End: 2022-07-01 | Stop reason: SDUPTHER

## 2022-07-01 RX ORDER — ASPIRIN 81 MG/1
81 TABLET ORAL DAILY
Qty: 20 TABLET | Refills: 0 | Status: SHIPPED | OUTPATIENT
Start: 2022-07-02 | End: 2022-07-22

## 2022-07-01 RX ORDER — LISINOPRIL 5 MG/1
5 TABLET ORAL DAILY
Qty: 30 TABLET | Refills: 0 | Status: SHIPPED | OUTPATIENT
Start: 2022-07-02 | End: 2022-07-29 | Stop reason: SDUPTHER

## 2022-07-01 RX ADMIN — FOLIC ACID 1 MG: 1 TABLET ORAL at 09:36

## 2022-07-01 RX ADMIN — ASPIRIN 81 MG: 81 TABLET, COATED ORAL at 09:36

## 2022-07-01 RX ADMIN — MULTIPLE VITAMINS W/ MINERALS TAB 1 TABLET: TAB ORAL at 09:35

## 2022-07-01 RX ADMIN — PREDNISONE 20 MG: 20 TABLET ORAL at 12:04

## 2022-07-01 RX ADMIN — CLOPIDOGREL BISULFATE 75 MG: 75 TABLET ORAL at 09:36

## 2022-07-01 RX ADMIN — LISINOPRIL 5 MG: 5 TABLET ORAL at 12:04

## 2022-07-01 RX ADMIN — THIAMINE HCL TAB 100 MG 100 MG: 100 TAB at 09:36

## 2022-07-01 RX ADMIN — APIXABAN 5 MG: 5 TABLET, FILM COATED ORAL at 09:36

## 2022-07-01 RX ADMIN — METOPROLOL SUCCINATE 50 MG: 50 TABLET, EXTENDED RELEASE ORAL at 09:41

## 2022-07-01 RX ADMIN — FUROSEMIDE 40 MG: 40 TABLET ORAL at 09:36

## 2022-07-01 RX ADMIN — POTASSIUM CHLORIDE 40 MEQ: 1500 TABLET, EXTENDED RELEASE ORAL at 09:35

## 2022-07-01 NOTE — ASSESSMENT & PLAN NOTE
· New onset afib with RVR s/p failed DCCV on 6/23, now on amiodarone  · Continue lopressor for rate control  · Cardiology following, planned to reattempt cardioversion vs ablation vs EP eval pending cath results  · Continue IV heparin drip with eventual transition to [address]  · 6/30-patient underwent MIKO and cardioversion successfully a with EP today; heparin drip stopped, started on Eliquis  Currently on amiodarone with good rate control  7/1 - amiodarone stopped 2/2 concerns for allergy; metoprolol BID started for rate control  Currently stable  EP appt scheduled for 8/3/22

## 2022-07-01 NOTE — PROGRESS NOTES
PROGRESS NOTE - Cardiology  Inga Patterson 46 y o  male MRN: [id number]  Unit/Bed#: -01 Encounter: [id number]    Assessment/Plan   Atrial fibrillation, new diagnosis  -rate control: will increase dose of metoprolol succinate to 50 mg b i d  (previously was on metoprolol succinate 75 mg daily)  -rhythm control:  Status post unsuccessful MIKO guided cardioversion on 06/23/2020 (3 times: 200, 300, 300 joules)  Patient underwent successful MIKO guided cardioversion yesterday (after was loaded with amiodarone and being on drip), currently in normal sinus rhythm  Given transaminitis and persistent upper body rash concerning for amiodarone allergy will stop it and will increase metoprolol succinate dose  Patient will be seen in EP clinic on 08/03/2022 and will decide on further rhythm control strategies such as initiating sotalol or AFib ablation   -systemic anticoagulation:  Chads Vasc score 3 (CHF history, hypertension history, CAD), started on apixaban 5 mg b i d  (40$ for 45 days supply), patient agreed to the price  -discussed need for alcohol cessation     New cardiomyopathy  -combined ischemic and atrial fibrillation related  underwent complex PCI on 06/29/2022 with PCI to prox LAD and mid RCA  Patient also has left main and the left circumflex disease  -MIKO from 06/30/2022 showed EF of 40%, dilated LV, dilated RV with reduced function  -patient will be discharged on diuretics, metoprolol succinate 50 mg b i d   Lisinopril 5 mg was added  Will follow up with cardiology clinic upon discharge for further medication adjustments       CAD  -3 vessel disease including left main disease, PCI to prox LAD and mid RCA  -on aspirin, atorvastatin, Plavix, metoprolol  -discussed smoking cessation     VASILE on CKD stage II  -creatinine on admission 1 56, patient stabilized around 1 46 which most likely is going to be new baseline    Rash  -   -concern for amiodarone related reaction    Ceftriaxone was stopped about 2 days ago with no improvement in rash  Stopped amiodarone  Will give dose of Benadryl and 5 days of prednisone 20 mg daily  Patient is instructed to do daily weights and if notice increase in weight/ortopnea/lower extremity edema consider taking extra dose of diuretic and notify our clinic  Interval History:  No acute events overnight, patient was cardioverted yesterday successfully, currently in normal sinus rhythm  TELE:  Sinus rhythm with heart rate 60-70s beats per minute, no events    Review of Systems  ROS as noted above, otherwise 12 point review of systems was performed and is negative  Historical Information   Past Medical History:   Diagnosis Date    Chest pain 9/25/2014    Eczema     Hidradenitis suppurativa 2/14/2018    Rib pain 1/28/2015     Past Surgical History:   Procedure Laterality Date    CARDIAC CATHETERIZATION Left 6/27/2022    Procedure: Cardiac catheterization;  Surgeon: Brooks Carrizales MD;  Location: [address] [place] [address] CATH LAB; Service: Cardiology    CARDIAC CATHETERIZATION N/A 6/27/2022    Procedure: Cardiac Coronary Angiogram;  Surgeon: Brooks Carrizales MD;  Location: [address] [place] [address] CATH LAB; Service: Cardiology    CARDIAC CATHETERIZATION Left 6/27/2022    Procedure: Cardiac Left Heart Cath;  Surgeon: Brooks Carrizales MD;  Location: [address] [place] [address] CATH LAB; Service: Cardiology    CARDIAC CATHETERIZATION N/A 6/29/2022    Procedure: Cardiac pci;  Surgeon: Rachel Gee MD;  Location:  CARDIAC CATH LAB; Service: Cardiology    CARDIAC CATHETERIZATION N/A 6/29/2022    Procedure: Cardiac Coronary Angiogram;  Surgeon: Rachel Gee MD;  [facility]: [facility] CARDI[facility] C[facility]; Service: Cardiology    CARDIAC CATHETERIZATION  6/29/2022    Procedure: Cardiac catheterization;  Surgeon: Rachel Gee MD;  [facility]: [facility] CARDI[facility] C[facility] LAB;   Service: Cardiology    CARDIAC CATHETERIZATION Left 6/29/2022    Procedure: Cardiac Left Heart Cath;  Surgeon: Rachel Gee MD;  [facility]: [facility] CARDI[facility] C[facility]; Service: Cardiology     Social History     Substance and Sexual Activity   Alcohol Use Yes    Comment: social; moderate     Social History     Substance and Sexual Activity   Drug Use No     Social History     Tobacco Use   Smoking Status Former Smoker    Packs/day: 1 00    Years: 20 00    Pack years: 20 00   Smokeless Tobacco Former User     Meds/Allergies   Hospital Medications:   Current Facility-Administered Medications   Medication Dose Route Frequency    acetaminophen (TYLENOL) tablet 975 mg  975 mg Oral Q8H PRN    apixaban (ELIQUIS) tablet 5 mg  5 mg Oral BID    aspirin (ECOTRIN LOW STRENGTH) EC tablet 81 mg  81 mg Oral Daily    atorvastatin (LIPITOR) tablet 40 mg  40 mg Oral Daily With [employer]    clopidogrel (PLAVIX) tablet 75 mg  75 mg Oral Daily    diphenhydrAMINE (BENADRYL) injection 25 mg  25 mg Intravenous Once    diphenhydrAMINE-zinc acetate (BENADRYL) 2-0 1 % cream   Topical TID PRN    folic acid (FOLVITE) tablet 1 mg  1 mg Oral Daily    furosemide (LASIX) tablet 40 mg  40 mg Oral BID (diuretic)    insulin lispro (HumaLOG) 100 units/mL subcutaneous injection 1-5 Units  1-5 Units Subcutaneous TID AC    insulin lispro (HumaLOG) 100 units/mL subcutaneous injection 1-5 Units  1-5 Units Subcutaneous HS    lisinopril (ZESTRIL) tablet 5 mg  5 mg Oral Daily    metoprolol (LOPRESSOR) injection 5 mg  5 mg Intravenous Q6H PRN    metoprolol succinate (TOPROL-XL) 24 hr tablet 50 mg  50 mg Oral BID    multivitamin-minerals (CENTRUM) tablet 1 tablet  1 tablet Oral Daily    ondansetron (ZOFRAN) injection 4 mg  4 mg Intravenous Q6H PRN    oxyCODONE-acetaminophen (PERCOCET) 5-325 mg per tablet 1 tablet  1 tablet Oral Q4H PRN    potassium chloride (K-DUR,KLOR-CON) CR tablet 40 mEq  40 mEq Oral Daily    predniSONE tablet 20 mg  20 mg Oral Daily    thiamine tablet 100 mg  100 mg Oral Daily     Home Medications:   Medications Prior to Admission   Medication    atorvastatin (LIPITOR) 10 mg tablet Allergies   Allergen Reactions    Amiodarone Rash     Objective   Vitals: Blood pressure 101/68, pulse 72, temperature 98 °F (36 7 °C), temperature source Oral, resp  rate 14, height 6' 2" (1 88 m), weight 121 kg (266 lb 12 1 oz), SpO2 96 %  Orthostatic Blood Pressures    Flowsheet Row Most Recent Value   Blood Pressure 101/68 filed at 07/01/2022 0813   Patient Position - Orthostatic VS Sitting filed at 06/30/2022 1847          Intake/Output Summary (Last 24 hours) at 7/1/2022 1308  Last data filed at 6/30/2022 1847  Gross per 24 hour   Intake 180 ml   Output 0 ml   Net 180 ml     Invasive Devices  Report    Peripheral Intravenous Line  Duration           Peripheral IV 06/27/22 Dorsal (posterior); Left Wrist 4 days              Physical Exam   GEN: NAD, alert and oriented, well appearing  SKIN:  Upper chest and back blanching rash  HEENT: NCAT, PERRL, EOMs intact  NECK: No JVD or carotid bruits appreciated  CARDIOVASCULAR: RRR, normal S1, S2 without murmurs, rubs, or gallops appreciated  LUNGS: Clear to auscultation bilaterally without wheezes, rhonchi, or rales  ABDOMEN: Soft, nontender, nondistended  EXTREMITIES/VASCULAR: perfused without clubbing, cyanosis, or edema b/l  PSYCH: Normal mood and affect  NEURO: CN ll-Xll grossly intact    Lab Results: I have personally reviewed pertinent lab results      Results from last 7 days   Lab Units 07/01/22  0543 06/30/22  0449 06/29/22  0503   WBC Thousand/uL 7 65 7 05 7 22   HEMOGLOBIN g/dL 11 3* 11 5* 11 1*   HEMATOCRIT % 35 8* 35 9* 34 6*   PLATELETS Thousands/uL 221 205 195     Results from last 7 days   Lab Units 07/01/22  0543 06/30/22  0449 06/29/22  0503   POTASSIUM mmol/L 4 4 4 0 4 0   CHLORIDE mmol/L 109* 108 111*   CO2 mmol/L 24 23 25   BUN mg/dL 16 16 15   CREATININE mg/dL 1 47* 1 45* 1 46*   CALCIUM mg/dL 8 9 9 1 8 6     Results from last 7 days   Lab Units 06/29/22  0503 06/28/22  0545 06/27/22  2226 06/27/22  0559   INR   --   --   --  1 13   PTT seconds 80* 65* 82* 56*     Results from last 7 days   Lab Units 07/01/22  0543 06/30/22  0449 06/25/22  0356   MAGNESIUM mg/dL 2 5 2 5 2 3     Imaging: I have personally reviewed pertinent reports

## 2022-07-01 NOTE — ASSESSMENT & PLAN NOTE
· Possibly due to bed sheets vs drug reaction, possible offending medications stopped  · PRN benadryl and steroids x 5 days given

## 2022-07-01 NOTE — PROGRESS NOTES
Cardiology Progress note  Unit/Bed#: -60 Encounter: [id number]        Anastasia Reyez 46 y o  male [id number]  Hospital Stay Days: 3    Assessment and Plan      Current Problem List   Principal Problem:    Atrial fibrillation [facility])  Active Problems:    Hypertension    Alcohol abuse    VASILE (acute kidney injury) ([facility] [address] )    PNA (pneumonia)    Acute systolic heart failure (HCC)    Coronary artery disease    Contact dermatitis    Pre-diabetes    Assessment/Plan:    1  CAD - S P PENNY X 2 , no further chest pain  Patient feels well  On DAPT currently  Patient euvolemic on exam    · Cont  DAPT  · Cont  Statin  · Cont  Lasix  · Would start lisinopril given EF of 40% - creatine is likely new baseline  · Outpatient follow up   2  Acute systolic HF  ·  Now resolved - continue lasix I and O    3  Atrial fibrillation  ·  Now off amiodarone - cont  Metoprolol succinate - now in sinus rhythm  · Will need planned readmission for sotalol loading - needs amio washout at this time  4  VASILE  ·  Likely new baseline  ·     Subjective     Patient seen and examined  No acute events overnight  No chest pain  No shortness of breath  Rash is still on chest switching off amiodarone  Objective     Vitals: Temp (24hrs), Av 9 °F (36 6 °C), Min:97 5 °F (36 4 °C), Max:98 2 °F (36 8 °C)  Current: Temperature: 98 °F (36 7 °C)  Patient Vitals for the past 24 hrs:   BP Temp Temp src Pulse Resp SpO2 Height Weight   22 0813 101/68 98 °F (36 7 °C) Oral 72 14 96 % -- --   22 0556 -- -- -- -- -- -- -- 121 kg (266 lb 12 1 oz)   22 2326 117/81 98 1 °F (36 7 °C) -- 79 18 97 % -- --   22 1847 118/78 98 2 °F (36 8 °C) Oral -- 16 -- -- --   22 1526 115/75 97 6 °F (36 4 °C) Oral 85 16 -- -- --   22 1152 99/68 97 5 °F (36 4 °C) -- 78 -- 94 % -- --   06/30/22 1100 110/74 -- -- 90 -- -- 6' 2" (1 88 m) 121 kg (267 lb)    Body mass index is 34 25 kg/m²    Physical Exam:  Physical Exam  Constitutional: Appearance: Normal appearance  Cardiovascular:      Rate and Rhythm: Normal rate and regular rhythm  Pulmonary:      Effort: Pulmonary effort is normal  No respiratory distress  Abdominal:      General: Abdomen is flat  Musculoskeletal:         General: No swelling  Skin:     General: Skin is warm  Neurological:      Mental Status: He is alert  Psychiatric:         Mood and Affect: Mood normal          Invasive Devices  Report    Peripheral Intravenous Line  Duration           Peripheral IV 06/27/22 Dorsal (posterior); Left Wrist 4 days                    Labs:   Results from last 7 days   Lab Units 07/01/22  0543 06/30/22  0449 06/29/22  0503 06/28/22  0545 06/27/22  0559 06/25/22  0356   WBC Thousand/uL 7 65 7 05 7 22 7 98 7 84 8 00   HEMOGLOBIN g/dL 11 3* 11 5* 11 1* 11 8* 11 8* 12 3   HEMATOCRIT % 35 8* 35 9* 34 6* 37 0 36 6 36 9   PLATELETS Thousands/uL 221 205 195 212 225 190   NEUTROS PCT %  --   --   --   --  62 57   MONOS PCT %  --   --   --   --  9 9   MONO PCT %  --  7  --   --   --   --       Results from last 7 days   Lab Units 07/01/22  0543 06/30/22  0449 06/29/22  0503 06/28/22  0545 06/27/22  2226 06/27/22  0559 06/26/22  2000 06/26/22  1407 06/26/22  0653 06/25/22  2219 06/25/22  1540 06/25/22  1034 06/25/22  0356 06/24/22  1948 06/24/22  1320   SODIUM mmol/L 139 140 141 142  --  142  --   --  141  --   --   --  142  --   --    POTASSIUM mmol/L 4 4 4 0 4 0 4 2  --  3 9  --   --  3 9  --   --   --  3 4*  --   --    CHLORIDE mmol/L 109* 108 111* 106  --  105  --   --  106  --   --   --  104  --   --    CO2 mmol/L 24 23 25 24  --  26  --   --  24  --   --   --  27  --   --    BUN mg/dL 16 16 15 16  --  18  --   --  21  --   --   --  23  --   --    CREATININE mg/dL 1 47* 1 45* 1 46* 1 46*  --  1 56*  --   --  1 49*  --   --   --  1 46*  --   --    CALCIUM mg/dL 8 9 9 1 8 6 8 6  --  9 0  --   --  9 2  --   --   --  8 8  --   --    ALK PHOS U/L 66  --   --   --   --   --   --   --   --   -- --   --   --   --   --    ALT U/L 115*  --   --   --   --   --   --   --   --   --   --   --   --   --   --    AST U/L 49*  --   --   --   --   --   --   --   --   --   --   --   --   --   --    MAGNESIUM mg/dL 2 5 2 5  --   --   --   --   --   --   --   --   --   --  2 3  --   --    PHOSPHORUS mg/dL 3 8 4 7*  --   --   --   --   --   --   --   --   --   --  5 2*  --   --    INR   --   --   --   --   --  1 13  --   --   --   --   --   --   --   --   --    PTT seconds  --   --  80* 65* 82* 56* 69* 69* 59* 65* 63* 54* 77* 45* 70*   EGFR ml/min/1 73sq m 54 55 54 54  --  50  --   --  53  --   --   --  54  --   --      Results from last 7 days   Lab Units 06/29/22  0503 06/28/22  0545 06/27/22  2226 06/27/22  0559 06/26/22  2000 06/26/22  1407 06/26/22  0653 06/25/22  2219 06/25/22  1540 06/25/22  1034 06/25/22  0356 06/24/22  1948 06/24/22  1320   INR   --   --   --  1 13  --   --   --   --   --   --   --   --   --    PTT seconds 80* 65* 82* 56* 69* 69* 59* 65* 63* 54* 77* 45* 70*             No results found for: PHART, PTS3FWM, PO2ART, STZ7JEW, H4LQEQSP, BEART, SOURCE  No components found for: HIV1X2  No results found for: HAV, HEPAIGM, HEPBIGM, HEPBCAB, HBEAG, HEPCAB  No results found for: SPEP, UPEP   Lab Results   Component Value Date    HGBA1C 5 9 (H) 06/22/2022    HGBA1C 5 8 (H) 12/16/2021     Lab Results   Component Value Date    CHOL 185 10/03/2014      Lab Results   Component Value Date    HDL 34 (L) 06/23/2022    HDL 36 (L) 12/16/2021    HDL 54 08/08/2020      Lab Results   Component Value Date    LDLCALC 69 06/23/2022    LDLCALC 120 (H) 12/16/2021    LDLCALC 133 (H) 08/08/2020      Lab Results   Component Value Date    TRIG 77 06/23/2022    TRIG 88 12/16/2021    TRIG 102 08/08/2020     No components found for: PROCAL      Micro:      Urinalysis:  No results found for: AMPHETUR, BDZUR, COCAINEUR, OPIATEUR, PCPUR, THCUR, ETOH, ACTMNPHEN, SALICYLATE       Invalid input(s): URIBILINOGEN        Intake and Outputs:  I/O       06/29 0701  06/30 0700 06/30 0701  07/01 0700 07/01 0701  07/02 0700    P  O   537     I V  (mL/kg) 1537 1 (12 7) 300 (2 5)     Total Intake(mL/kg) 1537 1 (12 7) 837 (6 9)     Urine (mL/kg/hr)  0 (0)     Blood 10      Total Output 10 0     Net +1527 1 +837            Unmeasured Urine Occurrence 2 x          Nutrition:  Diet Cardiovascular; Cardiac  Radiology Results:   No orders to display     Scheduled Medications:  apixaban, 5 mg, BID  aspirin, 81 mg, Daily  atorvastatin, 40 mg, Daily With Dinner  clopidogrel, 75 mg, Daily  diphenhydrAMINE, 25 mg, Once  folic acid, 1 mg, Daily  furosemide, 40 mg, Daily  insulin lispro, 1-5 Units, TID AC  insulin lispro, 1-5 Units, HS  metoprolol succinate, 50 mg, BID  multivitamin-minerals, 1 tablet, Daily  potassium chloride, 40 mEq, Daily  predniSONE, 20 mg, Daily  thiamine, 100 mg, Daily      PRN MEDS:  acetaminophen, 975 mg, Q8H PRN  diphenhydrAMINE-zinc acetate, , TID PRN  metoprolol, 5 mg, Q6H PRN  ondansetron, 4 mg, Q6H PRN  oxyCODONE-acetaminophen, 1 tablet, Q4H PRN      Last 24 Hour Meds: :   Medication Administration - last 24 hours from 06/30/2022 1001 to 07/01/2022 1001       Date/Time Order Dose Route Action Action by     07/01/2022 0936 aspirin (ECOTRIN LOW STRENGTH) EC tablet 81 mg 81 mg Oral Given Cristian Blocker, RN     06/30/2022 1754 atorvastatin (LIPITOR) tablet 40 mg 40 mg Oral Given [employer], RN     07/01/2022 0936 clopidogrel (PLAVIX) tablet 75 mg 75 mg Oral Given Cristian Blocker, RN     39/83/7508 1498 folic acid (FOLVITE) tablet 1 mg 1 mg Oral Given Cristian Blocker, RN     07/01/2022 0936 furosemide (LASIX) tablet 40 mg 40 mg Oral Given Cristian Blocker, RN     07/01/2022 0657 insulin lispro (HumaLOG) 100 units/mL subcutaneous injection 1-5 Units 1 Units Subcutaneous Not Given Darilyn Contras, RN     06/30/2022 1735 insulin lispro (HumaLOG) 100 units/mL subcutaneous injection 1-5 Units 1 Units Subcutaneous Not Given [facility], RN 06/30/2022 1057 insulin lispro (HumaLOG) 100 units/mL subcutaneous injection 1-5 Units 1 Units Subcutaneous Not Given Starr Purcell, RN     06/30/2022 2128 insulin lispro (HumaLOG) 100 units/mL subcutaneous injection 1-5 Units 1 Units Subcutaneous Not Given Debra Perez, RN     07/01/2022 0935 multivitamin-minerals (CENTRUM) tablet 1 tablet 1 tablet Oral Given April Blotter, RN     07/01/2022 0935 potassium chloride (K-DUR,KLOR-CON) CR tablet 40 mEq 40 mEq Oral Given April Blotter, RN     07/01/2022 8400 thiamine tablet 100 mg 100 mg Oral Given April Blotter, RN     07/01/2022 0935 metoprolol succinate (TOPROL-XL) 24 hr tablet 75 mg 75 mg Oral Not Given April Blotter, RN     07/01/2022 0932 amiodarone tablet 400 mg 400 mg Oral Not Given April Blotter, RN     06/30/2022 1754 amiodarone tablet 400 mg 400 mg Oral Given Precilla Sears, RN     07/01/2022 7683 apixaban (ELIQUIS) tablet 5 mg 5 mg Oral Given April Blotter, RN     06/30/2022 1754 apixaban (ELIQUIS) tablet 5 mg 5 mg Oral Given Precilla Sears, RN     07/01/2022 0941 metoprolol succinate (TOPROL-XL) 24 hr tablet 50 mg 50 mg Oral Given April Blotter, RN          PLEASE NOTE:  This encounter was completed utilizing the [employer] Direct Speech Voice Recognition Software  Grammatical errors, random word insertions, pronoun errors and incomplete sentences are occasional consequences of the system due to software limitations, ambient noise and hardware issues  These may be missed by proof reading prior to affixing electronic signature  Any questions or concerns about the content, text or information contained within the body of this dictation should be directly addressed to the physician for clarification  Please do not hesitate to call me directly if you have any any questions or concerns

## 2022-07-01 NOTE — ASSESSMENT & PLAN NOTE
Patient with acutely elevated LFT's and increased T bili  Recently lipitor increased from home dose of 10 mg daily to 40 mg  Patient recently given antibiotics and amiodarone   Monitor and consult GI as needed

## 2022-07-01 NOTE — ASSESSMENT & PLAN NOTE
· Baseline creatinine approximately 1 1-1 2; today is 1 47  · Likely due to hypoperfusion due to afib RVR, acite CHF  · Nephrology consulted, optimized for cardiac cath  · Avoid nephrotoxins, hypotension  · Monitor on daily labs; current creatinine may represent patient's new baseline

## 2022-07-01 NOTE — ASSESSMENT & PLAN NOTE
· GELA ackerman, out of withdrawal window  · Continue thiamine, folic acid, multivitamin  · Importance of cessation discussed with patient

## 2022-07-01 NOTE — NURSING NOTE
Patient was discharged to home with belongings, discharge instructions reviewed  IVs removed  Patient chose to walk out with RN and family at side

## 2022-07-01 NOTE — ASSESSMENT & PLAN NOTE
· S/p cardiac cath 6/27 with proximal LAD 90% severely calcified stenosis (unable to advance IVUS through lesion), mid circumflex discrete 30% stenosis and mid RCA discrete 755-80% stenosis  · Transferred to B for revascularization of prox LAD with atherectomy  · Pt received loading dose of Plavix  · Continue Plavix, asa, Lipitor and metoprolol  · Continue heparin drip  · 2 x PCI on 06/29; followed by Cardiology and will continue DA PT therapy with statin  As per cardiology note; DA PT therapy for 3 weeks, followed by Plavix and statin alone      Stable for discharge from cardiology stand point

## 2022-07-06 NOTE — UTILIZATION REVIEW
Continued Stay Review    Date: 6/30-7/1/22                      Patient Class: Inpatient    Level of Care: Med Surg    HPI:51 y o  male initially admitted on 6/28/22 with new onset Afib with RVR  Underwent cardiac cath on 6/29 with successful two vessel PCI  6/30 Electrophysiology: Right wrist ( catheterization access site) is evaluated, no hematoma, no brusing, radial pulse +2  Patient still in atrial fibrillation, pending MIKO/cardioversion  Will start Apixaban 5 mg bid from this morning  Cardiology: Patient with no issue overnight  He has no chest pain or significant dyspnea  Telemetry demonstrates atrial fibrillation with a moderate ventricular response  He had successful two vessel PCI yesterday  Scheduled today for MIKO cardioversion with amiodarone on board  BMP today with potassium of 4 0 and creatinine of 1 45  Hemoglobin 11 5  Patient will be on triple blood thinning therapy for three weeks and thereafter Eliquis and clopidogrel  PE: AOx3, lungs CTA  Electrophysiology:  Patient is currently in sinus rhythm status post cardioversion today  Patient has been placed on Eliquis for stroke prevention  Can continue with amiodarone, continue 400 mg twice daily for the time being, but can likely be decreased to 200 mg daily on maintenance dose tomorrow  Tolerating beta-blocker therapy as well  Patient reporting a rash that started 3 to 4 days ago  7/1 Electrophysiology:  Currently in NSR  Patient has rash on his torso concerning for amiodarone side effect  Recommend stopping amiodarone and starting steroids  Will need close outpatient follow-up        7/1 Discharged to home/self care            Vital Signs:       Date/Time Temp Pulse Resp BP MAP (mmHg) SpO2 Nasal Cannula O2 Flow Rate (L/min) O2 Device   07/01/22 08:13:44 98 °F (36 7 °C) 72 14 101/68 79 96 % -- --   06/30/22 2326 98 1 °F (36 7 °C) 79 18 117/81 93 97 % -- --   06/30/22 2200 -- -- -- -- -- -- -- None (Room air)   06/30/22 18:47:22 98 2 °F (36 8 °C) -- 16 118/78 91 -- -- --   06/30/22 15:26:45 97 6 °F (36 4 °C) 85 16 115/75 88 -- -- --   06/30/22 11:52:48 97 5 °F (36 4 °C) 78 -- 99/68 78 94 % -- --   06/30/22 1100 -- 90 -- 110/74 -- -- -- --   06/30/22 07:07:52 97 8 °F (36 6 °C) 99 -- 101/74 83 96 % -- --   06/30/22 03:29:12 97 7 °F (36 5 °C) 57 -- 111/78 89 96 % -- --   06/29/22 22:15:41 97 5 °F (36 4 °C) 96 16 112/80 91 95 % -- None (Room air)   06/29/22 1930 -- -- -- -- -- -- -- None (Room air)   06/29/22 19:17:59 97 3 °F (36 3 °C) Abnormal  78 18 108/69 82 96 % -- --   06/29/22 1801 -- 107 Abnormal  -- -- -- 96 % -- --   06/29/22 1737 -- 88 -- 99/64 76 95 % -- --   06/29/22 1706 -- 82 -- 108/74 85 95 % -- --   06/29/22 1640 -- 92 -- 108/75 86 95 % -- --   06/29/22 1605 -- 98 -- 112/78 89 95 % -- --   06/29/22 1535 97 5 °F (36 4 °C) 90 -- 119/83 95 93 % -- --   06/29/22 15:34:21 97 5 °F (36 4 °C) 92 -- 119/83 95 96 % -- --   06/29/22 12:15:53 -- -- -- -- -- -- 2 L/min Nasal cannula   06/29/22 1204 -- 101 24 Abnormal  110/79 -- 100 % -- None (Room air)   06/29/22 10:59:37 98 1 °F (36 7 °C) 51 Abnormal  16 116/83 94 93 % -- --   06/29/22 08:11:53 97 9 °F (36 6 °C) 93 18 116/85 95 95 % -- --   06/29/22 0800 -- -- -- -- -- -- -- None (Room air)   06/29/22 03:41:47 97 7 °F (36 5 °C) 94 -- 116/86 96 93 % -- --   06/29/22 0336 -- 139 Abnormal  -- -- -- 100 % -- --   06/29/22 0335 -- 131 Abnormal  -- -- -- 98 % -- --   06/29/22 03:18:39 97 7 °F (36 5 °C) 105 -- 110/85 93 94 % -- --       Pertinent Labs/Diagnostic Results:           6/30 repeat EKG:    Sinus rhythm with Premature atrial complexes  Low voltage QRS  Nonspecific T wave abnormality  Abnormal ECG  When compared with ECG of 30-JUN-2022 08:20, (unconfirmed)  Sinus rhythm has replaced Atrial fibrillation    6/30 Cardioversion:    The patient had an electrical cardioversion performed for new onset of atrial fibrillation in the setting of cardiomyopathy     Patient has been anticoagulated with heparin for approximately 1 week and recently started on Apixaban  Pre-ablation MIKO was performed to exclude thrombus      Patient was confirmed to be in atrial fibrillation with normal ventricular response on 12-lead ECG  He was consented for both procedures with questions answered  Patient positioned and prepped with defibrillator pads placed lalit-posteriorly      Anesthesia provided sedation  MIKO performed successfully with no evidence of left atrial / left atrial appendage thrombus  Synchronized cardioversion was performed with a single dose of 300J biphasic energy  The patient converted to sinus rhythm with rate of 50-60 bpm       Patient recovered well from procedure, and there were no complications  Returned to medical floor in stable condition        6/30 MIKO:       Left Ventricle: Left ventricular cavity size is dilated  Wall thickness is normal  There is eccentric hypertrophy  The left ventricular ejection fraction is 40%  Systolic function is moderately reduced  There is moderate global hypokinesis, most significantly at the anterior wall    Right Ventricle: Right ventricular cavity size is dilated  Systolic function is mildly reduced    Left Atrium: The atrium is dilated  There is no thrombus  There is no mass    Right Atrium: The atrium is dilated    Atrial Septum: There is a small fenestrated atrial septal defect with right to left shunting by color Doppler  No patent foramen ovale confirmed at rest by color flow Doppler    Left Atrial Appendage: There is a windsock appearance  There is reduced function  There is no thrombus  There is no mass  There is mild, intermittent spontaneous echo contrast     Mitral Valve: There is moderate regurgitation with a centrally directed jet    Tricuspid Valve:  There is moderate regurgitation        6/30 EKG:  Atrial fibrillation  Low voltage QRS  Nonspecific T wave abnormality  Abnormal ECG  When compared with ECG of 29-JUN-2022 14:07,  No significant change was found    6/29 Cardiac cath:  · Mid RCA lesion is 80% stenosed  · Prox LAD lesion is 90% stenosed  2v CAD      Prox LAD lesion   Angioplasty   Angioplasty using a compliant balloon was performed  The balloon used was a CATH BAL PTCA RX TREK 3 X 15MM  Maximum pressure: 16 annalisa  Inflation time: 20 sec  Time obtained: 13:05 EDT  First reinflation; Max pressure: 16 annalisa  Inflation time 9 sec  Time obtained: 13:06 EDT  Supplies used: CATH GUIDE LAUNCHER 6FR EBU 3 5; GUIDEWIRE RUNTHROUGH 180CM; CATH BAL PTCA RX TREK 3 X 15MM   Angioplasty   Angioplasty using an IVL balloon was performed  The balloon used was a CATH SHOCKWAVE C2 4 X 12MM  Intravascular lithotripsy was performed  Cycles: 5  Pulses: 10 Maximum pressure: 4 annalisa  Inflation time: 10 sec  Time obtained: 13:12 EDT  First reinflation; Max pressure: 4 annalisa  Inflation time 10 sec  Time obtained: 13:12 EDT  Second reinflation; Max pressure: 6 annalisa  Inflation time 10 sec  Time obtained: 13:13 EDT  Third reinflation; Max pressure: 4 annalisa  Inflation time 10 sec  Time obtained: 13:14 EDT  Fourth reinflation; Max pressure: 6 annalisa  Inflation time 10 sec  Time obtained: 13:15 EDT  Supplies used: CATH SHOCKWAVE C2 4 X 12MM; CATH GUIDE LAUNCHER 6FR EBU 3 5; GUIDEWIRE RUNTHROUGH 180CM   Stent   Drug-eluting stent was successfully placed  The stent used was a STENT XIENCE SKYPOINT 4 X 18MM  Stent was deployed by way of balloon expansion  Maximum pressure: 14 annalisa  Inflation time: 10 sec  Supplies used: STENT XIENCE SKYPOINT 4  X 18MM; GUIDEWIRE RUNTHROUGH 180CM; CATH GUIDE LAUNCHER 6FR EBU 3 5   Post-Intervention Lesion Assessment   The intervention was successful  Post-intervention PATRICIO flow is 3  There were no complications  There is a 0% residual stenosis post intervention  Mid RCA lesion   Stent   Drug-eluting stent was not successfully placed   Stent was successfully deployed, error on initial stamement The stent used was a STENT XIENCE [employer] 3 5 X 15MM  Stent was deployed by way of balloon expansion  Maximum pressure: 14 annalisa  Inflation time: 10 sec  Supplies used: CATH GUIDE LAUNCHER 6FR JR 4 0; GUIDEWIRE RUNTHROUGH 180CM; STENT XIENCE SKYPOINT 3 5 X 15MM   Stent   Drug-eluting stent was successfully placed  Post-Intervention Lesion Assessment   The intervention was successful  Post-intervention PATRICIO flow is 3  There were no complications  There is a 0% residual stenosis post intervention           Results from last 7 days   Lab Units 07/01/22  0543 06/30/22  0449   WBC Thousand/uL 7 65 7 05   HEMOGLOBIN g/dL 11 3* 11 5*   HEMATOCRIT % 35 8* 35 9*   PLATELETS Thousands/uL 221 205         Results from last 7 days   Lab Units 07/01/22  0543 06/30/22  0449   SODIUM mmol/L 139 140   POTASSIUM mmol/L 4 4 4 0   CHLORIDE mmol/L 109* 108   CO2 mmol/L 24 23   ANION GAP mmol/L 6 9   BUN mg/dL 16 16   CREATININE mg/dL 1 47* 1 45*   EGFR ml/min/1 73sq m 54 55   CALCIUM mg/dL 8 9 9 1   MAGNESIUM mg/dL 2 5 2 5   PHOSPHORUS mg/dL 3 8 4 7*     Results from last 7 days   Lab Units 07/01/22  0543   AST U/L 49*   ALT U/L 115*   ALK PHOS U/L 66   TOTAL PROTEIN g/dL 7 3   ALBUMIN g/dL 3 1*   TOTAL BILIRUBIN mg/dL 1 34*     Results from last 7 days   Lab Units 07/01/22  1123 07/01/22  0637 06/30/22  2120 06/30/22  1717 06/29/22  2055   POC GLUCOSE mg/dl 109 102 121 133 133     Results from last 7 days   Lab Units 07/01/22  0543 06/30/22  0449   GLUCOSE RANDOM mg/dL 100 98         Medications:   Scheduled Medications:    Scheduled Medications:    apixaban, 5 mg, BID  aspirin, 81 mg, Daily  atorvastatin, 40 mg, Daily With Dinner  clopidogrel, 75 mg, Daily  diphenhydrAMINE, 25 mg, Once  folic acid, 1 mg, Daily  furosemide, 40 mg, Daily  insulin lispro, 1-5 Units, TID AC  insulin lispro, 1-5 Units, HS  metoprolol succinate, 50 mg, BID  multivitamin-minerals, 1 tablet, Daily  potassium chloride, 40 mEq, Daily  predniSONE, 20 mg, Daily  thiamine, 100 mg, Daily     amiodarone tablet 400 mg  Dose: 400 mg  Freq: 2 times daily with meals Route: PO  Start: 06/30/22 0730 End: 07/01/22 0841         PRN MEDS:  acetaminophen, 975 mg, Q8H PRN  diphenhydrAMINE-zinc acetate, , TID PRN  metoprolol, 5 mg, Q6H PRN  ondansetron, 4 mg, Q6H PRN  oxyCODONE-acetaminophen, 1 tablet, Q4H PRN        Network Utilization Review Department  ATTENTION: Please call with any questions or concerns to [phone] and carefully listen to the prompts so that you are directed to the right person  All voicemails are confidential   Ty Limb all requests for admission clinical reviews, approved or denied determinations and any other requests to dedicated fax number below belonging to the campus where the patient is receiving treatment   List of dedicated fax numbers for the Facilities:  [address] DENIALS (Administrative/Medical Necessity) [phone]   [address] (Maternity/NICU/Pediatrics) [phone]   [address] [place] [address] [address] [place] [address]  [address] [place] [id number] [place] [address] [address] [facility] [phone]

## 2022-07-06 NOTE — PROGRESS NOTES
Cardiology Follow Up    Efraín Akhtar  1970  [id number]  [address]  [facility]  [address]  [phone]  [phone]    1  S/P drug eluting coronary stent placement  Basic metabolic panel    Magnesium   2  Persistent atrial fibrillation [facility])  Ambulatory referral to Sleep Medicine    digoxin (LANOXIN) 0 125 mg tablet    Ambulatory referral to Cardiac Electrophysiology   3  Other cardiomyopathy ([facility] [address] )     4  Chronic systolic heart failure ([facility] [address] )     5  Dyslipidemia     6  Alcohol abuse         Interval History:   Mr Efraín Akhtar was admitted to [facility] on 6/21/ - 6/28/22 with AF with RVR, CAD, VASILE and pneumonia  Mr Salma Martin presented to the ED with a 4 day hx of palpitations and dyspnea with exertion  He was found to have 's and treated with IV Cardizem bolus and drip  On 6/23/22 attempted DCCV 200J, 300J, and 300J without success  He was started on IV Amiodarone  , TG 77, HDL 34, LDL 69  LHC showed LM 90%, Prox LAD 90%, mid Cx 30%, mid RCA 75 - 80%  He was also found to have VASILE on CKD II and pneumonia  Mr Salma Martin was transferred to [facility] on 6/28 - 7/01/22  On 6/29/22 LHC showed prox LAD 90% stenosis, Mid RCA 80% stenosis  Prox LAD 90% stenosis sp Angioplasty x 2 and Xience skypoint PENNY placed across lesion, 0% residual stenosis, Mid RCA 80% stenosis sp Xience skypoint PENNY placed, 0% residual stenosis  6/30 3 D MIKO  LVEF 40%, RV mildly dilated  Systolic function mildly reduced,, RA dilated, mod MR, mod TR,  Atrial septum small fenestrated atrial septal defect with right-to-left shunt by color Doppler  No patent foramen ovale  Left atrial appendage when stock appearance reduced function no thrombus no mass mild intermittent spontaneous echo contrast  sp DCCV 300J biphasic to NSR  Unfortunately there is no discharge summary available to review        Mr Efraín Akhtar presents to our office for a recent hospitalization follow up visit  He denies dyspnea  With minimal exertion chest pain palpitations lightheadedness or dizziness  He denies ETOH use  He quit smoking months ago  He is taking all his medications as prescribed trying to limit sodium in his diet and fluids    Weight in the office 266 lb      Medical History   ETOH  Pre DM HgbA1C 5 9   Obesity   Tobacco abuse quit a few months ago     Allergies: Amiodarone- rash   Patient Active Problem List   Diagnosis    Hydradenitis    Carpal tunnel syndrome    Depression with anxiety    Psoriasis    Smoker    Hypertension    Alcohol abuse    Chronic cough    Atrial fibrillation (HCC)    Hyperlipidemia    CHF (congestive heart failure) ([facility])    VASILE (acute kidney injury) ([facility] [address] )    PNA (pneumonia)    Acute systolic heart failure (HCC)    Morbid obesity (HCC)    Coronary artery disease    Contact dermatitis    Pre-diabetes    Transaminitis     Past Medical History:   Diagnosis Date    Chest pain 9/25/2014    Eczema     Hidradenitis suppurativa 2/14/2018    Rib pain 1/28/2015     Social History     Socioeconomic History    Marital status: Single     Spouse name: Not on file    Number of children: Not on file    Years of education: Not on file    Highest education level: Not on file   Occupational History    Occupation: [employer]    Tobacco Use    Smoking status: Former Smoker     Packs/day: 1 00     Years: 20 00     Pack years: 20 00    Smokeless tobacco: Former User   Vaping Use    Vaping Use: Never used   Substance and Sexual Activity    Alcohol use: Yes     Comment: social; moderate    Drug use: No    Sexual activity: Yes     Partners: Female     Birth control/protection: Condom Male     Comment: single    Other Topics Concern    Not on file   Social History Narrative    Not on file     Social Determinants of Health     Financial Resource Strain: Not on file   Food Insecurity: No Food Insecurity  Worried About Running Out of Food in the Last Year: Never true    Jade of Food in the Last Year: Never true   Transportation Needs: No Transportation Needs    Lack of Transportation (Medical): No    Lack of Transportation (Non-Medical): No   Physical Activity: Not on file   Stress: Not on file   Social Connections: Not on file   Intimate Partner Violence: Not on file   Housing Stability: Low Risk     Unable to Pay for Housing in the Last Year: No    Number of Places Lived in the Last Year: 1    Unstable Housing in the Last Year: No      Family History   Problem Relation Age of Onset    Mental illness Mother         denied    Substance Abuse Mother         denied    Mental illness Father         denied    Substance Abuse Father         denied    Heart disease Father         cardiac disorder     Past Surgical History:   Procedure Laterality Date    CARDIAC CATHETERIZATION Left 6/27/2022    Procedure: Cardiac catheterization;  Surgeon: Valeria Dill MD;  Location: [address] [place] [address] CATH LAB; Service: Cardiology    CARDIAC CATHETERIZATION N/A 6/27/2022    Procedure: Cardiac Coronary Angiogram;  Surgeon: Valeria Dill MD;  Location: [address] [place] [address] CATH LAB; Service: Cardiology    CARDIAC CATHETERIZATION Left 6/27/2022    Procedure: Cardiac Left Heart Cath;  Surgeon: Valeria Dill MD;  Location: [address] [place] [address] CATH LAB; Service: Cardiology    CARDIAC CATHETERIZATION N/A 6/29/2022    Procedure: Cardiac pci;  Surgeon: Ginger Mujica MD;  Location:  CARDIAC CATH LAB; Service: Cardiology    CARDIAC CATHETERIZATION N/A 6/29/2022    Procedure: Cardiac Coronary Angiogram;  Surgeon: Ginger Mujica MD;  Location:  CARDIAC CATH LAB; Service: Cardiology    CARDIAC CATHETERIZATION  6/29/2022    Procedure: Cardiac catheterization;  Surgeon: Ginger Mujica MD;  Location:  CARDIAC CATH LAB;   Service: Cardiology    CARDIAC CATHETERIZATION Left 6/29/2022    Procedure: Cardiac Left Heart Cath;  Surgeon: Ina Stokes MD;  Location: [facility] CARDIAC CATH LAB; Service: Cardiology       Current Outpatient Medications:     apixaban (Eliquis) 5 mg, Take 1 tablet (5 mg total) by mouth 2 (two) times a day, Disp: 90 tablet, Rfl: 3    aspirin (ECOTRIN LOW STRENGTH) 81 mg EC tablet, Take 1 tablet (81 mg total) by mouth daily for 20 days, Disp: 20 tablet, Rfl: 0    atorvastatin (LIPITOR) 40 mg tablet, Take 1 tablet (40 mg total) by mouth daily with dinner, Disp: 30 tablet, Rfl: 0    clopidogrel (PLAVIX) 75 mg tablet, Take 1 tablet (75 mg total) by mouth daily, Disp: 30 tablet, Rfl: 0    furosemide (LASIX) 40 mg tablet, Take 1 tablet (40 mg total) by mouth 2 (two) times a day for 4 days, THEN 1 tablet (40 mg total) daily  , Disp: 38 tablet, Rfl: 0    lisinopril (ZESTRIL) 5 mg tablet, Take 1 tablet (5 mg total) by mouth daily, Disp: 30 tablet, Rfl: 0    metoprolol succinate (TOPROL-XL) 50 mg 24 hr tablet, Take 1 tablet (50 mg total) by mouth 2 (two) times a day, Disp: 60 tablet, Rfl: 0    predniSONE 20 mg tablet, Take 1 tablet (20 mg total) by mouth daily for 4 doses, Disp: 4 tablet, Rfl: 0  Allergies   Allergen Reactions    Amiodarone Rash       Labs:  Admission on 06/28/2022, Discharged on 07/01/2022   Component Date Value    POC Glucose 06/28/2022 108     PTT 06/29/2022 80 (A)    Sodium 06/29/2022 141     Potassium 06/29/2022 4 0     Chloride 06/29/2022 111 (A)    CO2 06/29/2022 25     ANION GAP 06/29/2022 5     BUN 06/29/2022 15     Creatinine 06/29/2022 1 46 (A)    Glucose 06/29/2022 104     Calcium 06/29/2022 8 6     eGFR 06/29/2022 54     WBC 06/29/2022 7 22     RBC 06/29/2022 3 65 (A)    Hemoglobin 06/29/2022 11 1 (A)    Hematocrit 06/29/2022 34 6 (A)    MCV 06/29/2022 95     MCH 06/29/2022 30 4     MCHC 06/29/2022 32 1     RDW 06/29/2022 13 3     Platelets 36/74/3781 195     MPV 06/29/2022 12 5     Ventricular Rate 06/28/2022 98     Atrial Rate 06/28/2022 141     QRSD Interval 06/28/2022 92  QT Interval 06/28/2022 376     QTC Interval 06/28/2022 480     QRS Axis 06/28/2022 50     T Wave Axis 06/28/2022 89     POC Glucose 06/29/2022 115     Ventricular Rate 06/29/2022 96     Atrial Rate 06/29/2022 88     QRSD Interval 06/29/2022 86     QT Interval 06/29/2022 350     QTC Interval 06/29/2022 442     QRS Axis 06/29/2022 64     T Wave Axis 06/29/2022 97     POC Glucose 06/29/2022 100     Activated Clotting Time,* 06/29/2022 276 (A)    Specimen Type 06/29/2022 VENOUS     LV EF 06/30/2022 40     TR Peak Jeff 06/30/2022 2 4     Triscuspid Valve Regurgi* 06/30/2022 24 0     Ao root 06/30/2022 3 70     Asc Ao 06/30/2022 3 4     Ventricular Rate 06/29/2022 95     Atrial Rate 06/29/2022 65     QRSD Interval 06/29/2022 82     QT Interval 06/29/2022 378     QTC Interval 06/29/2022 475     QRS Axis 06/29/2022 45     T Wave Axis 06/29/2022 72     POC Glucose 06/29/2022 112     POC Glucose 06/29/2022 119     POC Glucose 06/29/2022 133     WBC 06/30/2022 7 05     RBC 06/30/2022 3 80 (A)    Hemoglobin 06/30/2022 11 5 (A)    Hematocrit 06/30/2022 35 9 (A)    MCV 06/30/2022 95     MCH 06/30/2022 30 3     MCHC 06/30/2022 32 0     RDW 06/30/2022 13 3     MPV 06/30/2022 12 4     Platelets 97/16/8495 205     Magnesium 06/30/2022 2 5     Phosphorus 06/30/2022 4 7 (A)    Sodium 06/30/2022 140     Potassium 06/30/2022 4 0     Chloride 06/30/2022 108     CO2 06/30/2022 23     ANION GAP 06/30/2022 9     BUN 06/30/2022 16     Creatinine 06/30/2022 1 45 (A)    Glucose 06/30/2022 98     Calcium 06/30/2022 9 1     eGFR 06/30/2022 55     Segmented % 06/30/2022 73     Lymphocytes % 06/30/2022 16     Monocytes % 06/30/2022 7     Eosinophils, % 06/30/2022 3     Basophils % 06/30/2022 0     Myelocytes % 06/30/2022 1     Absolute Neutrophils 06/30/2022 5 15     Lymphocytes Absolute 06/30/2022 1 13     Monocytes Absolute 06/30/2022 0 49     Eosinophils Absolute 06/30/2022 0 21  Basophils Absolute 06/30/2022 0 00     RBC Morphology 06/30/2022 Present     Polychromasia 06/30/2022 Present     Platelet Estimate 27/00/9994 Adequate     Ventricular Rate 06/30/2022 66     Atrial Rate 06/30/2022 66     VA Interval 06/30/2022 186     QRSD Interval 06/30/2022 90     QT Interval 06/30/2022 412     QTC Interval 06/30/2022 431     P Axis 06/30/2022 54     QRS Axis 06/30/2022 83     T Wave Axis 06/30/2022 94     Ventricular Rate 06/30/2022 96     Atrial Rate 06/30/2022 82     QRSD Interval 06/30/2022 90     QT Interval 06/30/2022 374     QTC Interval 06/30/2022 472     QRS Axis 06/30/2022 62     T Wave Axis 06/30/2022 89     POC Glucose 06/30/2022 133     POC Glucose 06/30/2022 121     WBC 07/01/2022 7 65     RBC 07/01/2022 3 81 (A)    Hemoglobin 07/01/2022 11 3 (A)    Hematocrit 07/01/2022 35 8 (A)    MCV 07/01/2022 94     MCH 07/01/2022 29 7     MCHC 07/01/2022 31 6     RDW 07/01/2022 13 6     MPV 07/01/2022 12 7     Platelets 85/56/4616 221     Sodium 07/01/2022 139     Potassium 07/01/2022 4 4     Chloride 07/01/2022 109 (A)    CO2 07/01/2022 24     ANION GAP 07/01/2022 6     BUN 07/01/2022 16     Creatinine 07/01/2022 1 47 (A)    Glucose 07/01/2022 100     Calcium 07/01/2022 8 9     Corrected Calcium 07/01/2022 9 6     AST 07/01/2022 49 (A)    ALT 07/01/2022 115 (A)    Alkaline Phosphatase 07/01/2022 66     Total Protein 07/01/2022 7 3     Albumin 07/01/2022 3 1 (A)    Total Bilirubin 07/01/2022 1 34 (A)    eGFR 07/01/2022 54     Magnesium 07/01/2022 2 5     Phosphorus 07/01/2022 3 8     POC Glucose 07/01/2022 102     POC Glucose 07/01/2022 109    No results displayed because visit has over 200 results          Imaging: CT abdomen pelvis wo contrast    Result Date: 6/22/2022  Narrative: CT ABDOMEN AND PELVIS WITHOUT IV CONTRAST INDICATION:   Abdominal pain, acute, nonlocalized Worsening abdominal pain, pressor requirement, no significant findings on personal interpretation of KUB/CXR  COMPARISON:  CT chest 6/22/2022 TECHNIQUE:  CT examination of the abdomen and pelvis was performed without intravenous contrast  This examination was performed without intravenous contrast in the context of the critical nationwide Omnipaque shortage  Axial, sagittal, and coronal 2D reformatted images were created from the source data and submitted for interpretation  Radiation dose length product (DLP) for this visit:  1140 mGy-cm   This examination, like all CT scans performed in the [facility], was performed utilizing techniques to minimize radiation dose exposure, including the use of iterative reconstruction and automated exposure control  Enteric contrast was not administered  FINDINGS: ABDOMEN Mild motion artifact  LOWER CHEST:  Small bilateral pleural effusions with minimal basilar subsegmental atelectasis, right greater than left  Mild bibasilar smooth septal thickening  Partially visualized mild mediastinal and right hilar adenopathy  Coronary artery calcification  LIVER/BILIARY TREE:  Unremarkable  GALLBLADDER:  No calcified gallstones  Potential contracted gallbladder with moderate gallbladder wall thickening  No pericholecystic inflammatory changes  SPLEEN:  Unremarkable  PANCREAS:  Diffuse atrophy and fatty infiltration of the pancreas  ADRENAL GLANDS:  Unremarkable  KIDNEYS/URETERS:  Excreted contrast in bilateral renal collecting systems limits evaluation for renal calculi  No hydronephrosis or perinephric collection  STOMACH AND BOWEL:  Sigmoid diverticulosis without diverticulitis  No bowel obstruction  APPENDIX:  A normal appendix was visualized  ABDOMINOPELVIC CAVITY:  No ascites  No pneumoperitoneum  No lymphadenopathy  VESSELS:  Aortoiliac calcification  No aneurysm  PELVIS REPRODUCTIVE ORGANS:  Unremarkable for patient's age  URINARY BLADDER:  Excreted contrast in the bladder  Otherwise unremarkable   ABDOMINAL WALL/INGUINAL REGIONS:  Small fat-containing umbilical hernia  Tiny bilateral inguinal fat-containing hernias  OSSEOUS STRUCTURES:  No acute fracture or osseous destructive lesion identified  Degenerative changes of the spine, pubic symphysis, and multiple joints  Impression: Stable findings of the lung bases including partially visualized mild mediastinal adenopathy  Noncontrast chest CT follow-up in 3 months is advised  No definitive evidence of acute abdominopelvic process allowing for mild motion artifact  Gallbladder wall thickening potentially due to CHF  No definitive calcified gallstones or pericholecystic inflammatory changes  Sigmoid diverticulosis  Workstation performed: [id number]     XR chest 1 view portable    Result Date: 6/22/2022  Narrative: CHEST INDICATION:   tachycardia  COMPARISON:  1/28/2015 EXAM PERFORMED/VIEWS:  XR CHEST PORTABLE Images: 2 FINDINGS: Cardiomediastinal silhouette appears unremarkable  The lungs are clear  No pneumothorax or pleural effusion  Osseous structures appear within normal limits for patient age  Impression: No acute cardiopulmonary disease  Findings are stable Workstation performed: [id number]     XR abdomen 1 view kub    Result Date: 6/22/2022  Narrative: ABDOMEN INDICATION:   Abdominal pain and distention with hypotension  COMPARISON:  None VIEWS:  AP supine Images: 4 FINDINGS: There is a nonobstructive bowel gas pattern  No discernible free air on this supine study  Upright or left lateral decubitus imaging is more sensitive to detect subtle free air in the appropriate setting  No pathologic calcifications or soft tissue masses  Visualized lung bases are clear  Visualized osseous structures are unremarkable for the patient's age  Contrast visualized within the bladder related to recent CTA     Impression: Unremarkable abdomen  Workstation performed: [id number]     Cardiac catheterization    Result Date: 6/29/2022  Narrative: · Mid RCA lesion is 80% stenosed   · Prox LAD lesion is 90% stenosed  2v CAD     Cardiac catheterization    Result Date: 6/27/2022  Narrative: · Mid LM lesion is 90% stenosed  Proximal LAD 90% severely calcified stenosis (unable to advanced IVUS through lesion) Mid circumflex discrete 30% stenosis Mid RCA discrete 75-80% stenosis     XR chest portable ICU    Result Date: 6/22/2022  Narrative: CHEST INDICATION:   Dyspnea, increasing abdominal pain, hemodynamic instability  COMPARISON:  6/21/2022 EXAM PERFORMED/VIEWS:  XR CHEST PORTABLE ICU Images: 3 FINDINGS: Cardiomediastinal silhouette appears unremarkable  The lungs are clear  No pneumothorax or pleural effusion  Osseous structures appear within normal limits for patient age  Impression: No acute cardiopulmonary disease  Findings are stable Workstation performed: [id number]     US right upper quadrant    Result Date: 6/23/2022  Narrative: RIGHT UPPER QUADRANT ULTRASOUND INDICATION:     transaminitis  COMPARISON:  CT abdomen pelvis 6/22/2022  TECHNIQUE:   Real-time ultrasound of the right upper quadrant was performed with a curvilinear transducer with both volumetric sweeps and still imaging techniques  FINDINGS: PANCREAS:  Portions of the pancreas are obscured by bowel gas  Visualized portions of the pancreas are grossly unremarkable  AORTA AND IVC:  Visualized portions are normal for patient age  LIVER: Size:  Within normal range  The liver measures 17 9 cm in the midclavicular line  Contour:  Surface contour is smooth  Parenchyma:  Echogenicity and echotexture are within normal limits  No liver mass identified  Limited imaging of the main portal vein shows it to be patent and hepatopetal   There is mild pulsatility of the portal vein  BILIARY: No gallbladder findings  No intrahepatic biliary dilatation  CBD measures 5 0 mm  No evidence of choledocholithiasis within the visualized portions of the common bile duct  KIDNEY: Right kidney measures 10 5 x 5 6 x 6 4 cm   Volume 195 3 mL Kidney within normal limits  ASCITES:   None  Impression: 1  Mild pulsatility of the portal vein  This is a nonspecific finding which can be seen secondary to a variety of etiologies including congestive heart failure or hepatocellular disease  Otherwise unremarkable right upper quadrant ultrasound  Workstation performed: [id number]     CTA ED chest PE Study    Result Date: 6/22/2022  Narrative: CTA - CHEST WITH IV CONTRAST - PULMONARY ANGIOGRAM INDICATION:   tachycarida, tachypnea, elevated dimer  COMPARISON: None  TECHNIQUE: CTA examination of the chest was performed using angiographic technique according to a protocol specifically tailored to evaluate for pulmonary embolism  Axial, sagittal, and coronal 2D reformatted images were created from the source data and  submitted for interpretation  In addition, coronal 3D MIP postprocessing was performed on the acquisition scanner  Radiation dose length product (DLP) for this visit:  643 mGy-cm   This examination, like all CT scans performed in the [facility], was performed utilizing techniques to minimize radiation dose exposure, including the use of iterative reconstruction and automated exposure control  IV Contrast:  70 mL of iohexol (OMNIPAQUE)  FINDINGS: PULMONARY ARTERIAL TREE:  No pulmonary embolus is seen  LUNGS:  Patchy groundglass airspace disease throughout the right upper lobe  Diffuse interlobar septal thickening  Mild bibasilar atelectasis  There is no tracheal or endobronchial lesion  PLEURA:  Small right greater than left pleural effusions  HEART/GREAT VESSELS:  Unremarkable for patient's age  No thoracic aortic aneurysm  MEDIASTINUM AND WILLAM:  Unremarkable  CHEST WALL AND LOWER NECK:   Unremarkable  VISUALIZED STRUCTURES IN THE UPPER ABDOMEN:  Unremarkable  OSSEOUS STRUCTURES:  No acute fracture or destructive osseous lesion       Impression: Patchy groundglass airspace disease throughout the right upper lobe consistent with acute infiltrate, likely infectious or inflammatory etiology Findings consistent with CHF  Workstation performed: [id number]     Cardioversion    Result Date: 6/30/2022  Narrative: The patient had an electrical cardioversion performed for new onset of atrial fibrillation in the setting of cardiomyopathy  Patient has been anticoagulated with heparin for approximately 1 week and recently started on Apixaban  Pre-ablation MIKO was performed to exclude thrombus  Patient was confirmed to be in atrial fibrillation with normal ventricular response on 12-lead ECG  He was consented for both procedures with questions answered  Patient positioned and prepped with defibrillator pads placed lalit-posteriorly  Anesthesia provided sedation  MIKO performed successfully with no evidence of left atrial / left atrial appendage thrombus  Synchronized cardioversion was performed with a single dose of 300J biphasic energy  The patient converted to sinus rhythm with rate of 50-60 bpm  Patient recovered well from procedure, and there were no complications  Returned to medical floor in stable condition  Linh Galvin MD (Cardiology Fellow) I was present throughout the procedure and supervised Dr Chaz Gibbs  I agree with his assessment and documentation as outlined  Sylvia Mike MD Attending Cardiologist     Cardioversion    Result Date: 6/23/2022  Narrative: Indication: Symptomatic atrial fibrillation with left ventricular systolic dysfunction Anti-coagulation: Heparin gtt Anesthesia: Conscious sedation provided by anesthesiology Procedure: MIKO and CV procedures including risks, benefits and alternatives were explained to the patient  Questions were answered  Informed consent was obtained  Patient was moved to the GI lab  MAC was given by anesthetist   MIKO probe was passed in without any difficulty  Images were obtained  MIKO report is dictated separately  Probe was withdrawn without any problem  No complications were encountered   MIKO findings: No intracardiac thrombus  Reduced LV systolic function  Electrical Pad Placement: Anteroposteriorly interscapular region and upper sternum Failed cardioversion despite 3 synchronized biphasic shocks at 200 -> 300 -> 300 J while pt was on Amiodarone drip  Complications: None Disposition: Cath lab recovery area  Once criteria are met patient can be transferred to ICU room  Echo complete w/ contrast if indicated    Result Date: 6/22/2022  Narrative: Olga Pickard  Left Ventricle: Left ventricular cavity size is dilated  Wall thickness is normal  Systolic function is moderately reduced (35%)  There is moderate global hypokinesis with regional variation (hypokinesis of anterior wall)  Diastolic function is normal    Right Ventricle: Right ventricular cavity size is upper normal  Systolic function is normal    Left Atrium: The atrium is mildly dilated    Right Atrium: The atrium is mildly dilated    Aortic Valve: There is trace regurgitation    Mitral Valve: There is moderate to severe regurgitation    Tricuspid Valve: There is mild to moderate regurgitation  The right ventricular systolic pressure is mildly elevated (53 mm Hg)    Pulmonic Valve: There is trace regurgitation    IVC/SVC: The inferior vena cava is dilated  Respirophasic changes were blunted (less than 50% variation)  MIKO    Result Date: 6/30/2022  Narrative: Olga Melly  Left Ventricle: Left ventricular cavity size is dilated  Wall thickness is normal  There is eccentric hypertrophy  The left ventricular ejection fraction is 40%  Systolic function is moderately reduced  There is moderate global hypokinesis, most significantly at the anterior wall    Right Ventricle: Right ventricular cavity size is dilated  Systolic function is mildly reduced    Left Atrium: The atrium is dilated  There is no thrombus  There is no mass    Right Atrium: The atrium is dilated     Atrial Septum: There is a small fenestrated atrial septal defect with right to left shunting by color Doppler  No patent foramen ovale confirmed at rest by color flow Doppler    Left Atrial Appendage: There is a windsock appearance  There is reduced function  There is no thrombus  There is no mass  There is mild, intermittent spontaneous echo contrast    Mitral Valve: There is moderate regurgitation with a centrally directed jet    Tricuspid Valve: There is moderate regurgitation  3D was performed for further investigation, better visualization, and additional quantification of the  atrial septum  Results from the utilization of 3D are listed in the report below  MIKO    Result Date: 6/23/2022  Narrative: Heraclio Lee  Left Ventricle: Left ventricular cavity size is dilated  Wall thickness is normal  Systolic function is moderately reduced - 35%  There is moderate global hypokinesis with regional variation  There was spontaneous echo contrast (smoke) seen in the LV    Left Atrium: The atrium is mildly dilated  There is no thrombus  There is mild, continuous spontaneous echo contrast    Right Atrium: The atrium is mildly dilated    Atrial Septum: No patent foramen ovale confirmed at rest by color flow Doppler    Left Atrial Appendage: There is normal function  There is no thrombus    Mitral Valve: There is moderate regurgitation    Tricuspid Valve: There is mild regurgitation  Review of Systems:  Review of Systems   All other systems reviewed and are negative  Physical Exam:  Physical Exam  Vitals reviewed  Constitutional:       Appearance: Normal appearance  HENT:      Head: Normocephalic  Neck:      Comments: No JVD  Cardiovascular:      Rate and Rhythm: Normal rate  Rhythm irregular  Pulses: Normal pulses  Heart sounds: Normal heart sounds  Pulmonary:      Effort: Pulmonary effort is normal       Breath sounds: Normal breath sounds  Abdominal:      General: Bowel sounds are normal       Palpations: Abdomen is soft  Musculoskeletal:         General: Normal range of motion  Cervical back: Normal range of motion and neck supple  Right lower leg: No edema  Left lower leg: No edema  Skin:     General: Skin is warm and dry  Capillary Refill: Capillary refill takes less than 2 seconds  Neurological:      General: No focal deficit present  Mental Status: He is alert and oriented to person, place, and time  Psychiatric:         Mood and Affect: Mood normal          Behavior: Behavior normal          Discussion/Summary:  1  6/29/22 sp LHC showed prox LAD 90% stenosis, Mid RCA 80% stenosis  Prox LAD 90% stenosis sp Angioplasty x 2 and Xience skypoint PENNY placed across lesion, 0% residual stenosis, Mid RCA 80% stenosis sp Xience skypoint PENNY placed, 0% residual stenosis  Continue on aspirin 81 mg daily until July 29th then stop  Continue Eliquis 5 mg b i d ,  Plavix 75 mg daily is aware not stop for any reason will continue for 1 year  Continue on metoprolol succinate 50 mg b i d , lisinopril 5 mg daily, Lipitor 40 mg daily, digoxin 125 mcg daily cardiac rehabilitation in the near future, DASH diet   2  Atrial fibrillation 6/23/22 attempted DCCV 200J, 300J, and 300J without success  IV Amiodarone started, transitioned to oral Amiodarone, sp DCCV  Synchronized cardioversion with single 300 joule biphasic energy converted to normal sinus rhythm  Amiodarone stopped due to rash  EKG in the office Atrial fibrillation controlled ventricular response  [id number]= 2 (HTN, CHF)  Continue on Eliquis 5 mg b i d  for stroke prevention  Continue on metoprolol succinate 50 mg q 12 hours  Blood pressure lower in the office today  Unable to up titrate metoprolol  Discussed with Dr Elin Davidson  Will add digoxin 125 mcg daily  Follow-up with EP in 2 weeks  Ambulatory referral to Sleep Medicine rule out sleep apnea  3  Cardiomyopathy LVEF improved from 35% to 40% mixed ischemic and non ischemic   Secondary to AFib with RVR, continue on GDMT  4   Chronic HFrEF LVEF 40%, NYHA class II stage C-   Weight in the office 266 lb on physical exam euvolemic compensated  Heart rate atrial fibrillation 94 beats per minute blood pressure 92/60  Continue on metoprolol succinate 50 mg b i d , lisinopril 5 mg daily instructed to  take at bedtime  Starting tomorrow  Start digoxin 125 mcg daily  Heart failure handout booklet provided  Instructed on 2 g sodium diet limiting fluids monitoring for signs and symptoms of heart failure when to call our office  5  Dyslipidemia 6/23/22 , TG 77, HDL 34, LDL 69  Continue on Lipitor 40 mg daily, DASH diet   6   ETOH abuse continues to refrain from ETOH intake

## 2022-07-06 NOTE — UTILIZATION REVIEW
Notification of Discharge   This is a Notification of Discharge from our facility [address]  Please be advised that this patient has been discharge from our facility  Below you will find the admission and discharge date and time including the patients disposition  UTILIZATION REVIEW CONTACT:  Solomon Erickson  Utilization   Network Utilization Review Department  Phone: [phone] x carefully listen to the prompts  All voicemails are confidential   Email: [email or web address]     PHYSICIAN ADVISORY SERVICES:  FOR [id number] REVIEW - MEDICAL NECESSITY DENIAL  Phone: [phone]  Fax: [phone]  Email: [email or web address]     PRESENTATION DATE: 6/28/2022  9:37 PM  OBERVATION ADMISSION DATE:   INPATIENT ADMISSION DATE: 6/28/22  9:37 PM   DISCHARGE DATE: 7/1/2022  3:47 PM  DISPOSITION: Home/Self Care Home/Self Care      IMPORTANT INFORMATION:  Send all requests for admission clinical reviews, approved or denied determinations and any other requests to dedicated fax number below belonging to the campus where the patient is receiving treatment   List of dedicated fax numbers:  [address] DENIALS (Administrative/Medical Necessity) [phone]   [address] (Maternity/NICU/Pediatrics) [phone]   [facility] [phone]   [address] [phone]   [address] [place] [address] [phone]   [address] [place] [address] [phone]   [facility]  [phone]   [address] [place] [address], [facility]  [address] [facility] [address] [facility] [phone]

## 2022-07-07 ENCOUNTER — OFFICE VISIT (OUTPATIENT)
Dept: CARDIOLOGY CLINIC | Facility: CLINIC | Age: 52
End: 2022-07-07
Payer: COMMERCIAL

## 2022-07-07 VITALS
SYSTOLIC BLOOD PRESSURE: 92 MMHG | DIASTOLIC BLOOD PRESSURE: 68 MMHG | OXYGEN SATURATION: 98 % | BODY MASS INDEX: 34.14 KG/M2 | HEART RATE: 94 BPM | WEIGHT: 266 LBS | HEIGHT: 74 IN

## 2022-07-07 DIAGNOSIS — I48.19 PERSISTENT ATRIAL FIBRILLATION (HCC): ICD-10-CM

## 2022-07-07 DIAGNOSIS — F10.10 ALCOHOL ABUSE: ICD-10-CM

## 2022-07-07 DIAGNOSIS — E78.5 DYSLIPIDEMIA: ICD-10-CM

## 2022-07-07 DIAGNOSIS — Z95.5 S/P DRUG ELUTING CORONARY STENT PLACEMENT: Primary | ICD-10-CM

## 2022-07-07 DIAGNOSIS — I42.8 OTHER CARDIOMYOPATHY (HCC): ICD-10-CM

## 2022-07-07 DIAGNOSIS — I50.22 CHRONIC SYSTOLIC HEART FAILURE (HCC): ICD-10-CM

## 2022-07-07 PROCEDURE — 99215 OFFICE O/P EST HI 40 MIN: CPT | Performed by: INTERNAL MEDICINE

## 2022-07-07 PROCEDURE — 93000 ELECTROCARDIOGRAM COMPLETE: CPT | Performed by: INTERNAL MEDICINE

## 2022-07-07 RX ORDER — DIGOXIN 125 MCG
125 TABLET ORAL DAILY
Qty: 30 TABLET | Refills: 3 | Status: SHIPPED | OUTPATIENT
Start: 2022-07-07 | End: 2022-07-26 | Stop reason: SDUPTHER

## 2022-07-07 NOTE — PATIENT INSTRUCTIONS
Maintain a 2 gram daily sodium diet and 1500 ml daily fluid restriction  Check daily weights  If you gained 3 pounds in one day, 5 pounds in one week, or experience worsening shortness of breath or increasing lower leg swelling  Please call the heart failure office at [phone]    Please bring a  list of your current medications and daily weights to the office visit     Continue to avoid alcohol  Digoxin 125mcg daily   Take Lisinopril 5mg daily at bedtime   Follow up with Electrophysiology in 2 weeks   Ambulatory referral to sleep medicine   Stop ASA on 7/29/22

## 2022-07-11 ENCOUNTER — TELEPHONE (OUTPATIENT)
Dept: FAMILY MEDICINE CLINIC | Facility: CLINIC | Age: 52
End: 2022-07-11

## 2022-07-11 NOTE — TELEPHONE ENCOUNTER
Pt was DC'ed from [facility] 07/01/22    he was there for Atrial fibrillation  Karrie Nissen there are no 40 min slots available w/in the  2 week time frame for a REBEKAH    Pt didn't want to see another provider,  just Dr Julienne Lew  so is asking what  recommends  Karrie Nissen ? ??    Pt wasn't feeling well yesterday  Karrie Nissen almost went back to the ER    but he improved    so really wants to go over everything w/Dr Julienne Lew    they prescribed different med's for him  Please advise asap  Karrie Nissen

## 2022-07-18 NOTE — DISCHARGE SUMMARY
[address] [facility]  Discharge- Albesa Courts 1970, 46 y o  male MRN: [id number]  Unit/Bed#: -01 Encounter: [id number]  Primary Care Provider: Michel Cavazos MD   Date and time admitted to hospital: 6/28/2022  9:37 PM    * Atrial fibrillation [facility])  Assessment & Plan  · New onset afib with RVR s/p failed DCCV on 6/23, now on amiodarone  · Continue lopressor for rate control  · Cardiology following, planned to reattempt cardioversion vs ablation vs EP eval pending cath results  · Continue IV heparin drip with eventual transition to [facility] [address]  · 6/30-patient underwent MIKO and cardioversion successfully a with EP today; heparin drip stopped, started on Eliquis  Currently on amiodarone with good rate control  7/1 - amiodarone stopped 2/2 concerns for allergy; metoprolol BID started for rate control  Currently stable  EP appt scheduled for 8/3/22  Transaminitis  Assessment & Plan  Patient with acutely elevated LFT's and increased T bili  Recently lipitor increased from home dose of 10 mg daily to 40 mg  Patient recently given antibiotics and amiodarone   Monitor and consult GI as needed    Pre-diabetes  Assessment & Plan  · A1c 5 9  · SSI, hypoglycemia protocol    Contact dermatitis  Assessment & Plan  · Possibly due to bed sheets vs drug reaction, possible offending medications stopped  · PRN benadryl and steroids x 5 days given    Coronary artery disease  Assessment & Plan  · S/p cardiac cath 6/27 with proximal LAD 90% severely calcified stenosis (unable to advance IVUS through lesion), mid circumflex discrete 30% stenosis and mid RCA discrete 755-80% stenosis  · Transferred to [facility] for revascularization of prox LAD with atherectomy  · Pt received loading dose of Plavix  · Continue Plavix, asa, Lipitor and metoprolol  · Continue heparin drip  · 2 x PCI on 06/29; followed by Cardiology and will continue DA PT therapy with statin    As per cardiology note; DA PT therapy for 3 weeks, followed by Plavix and statin alone      Stable for discharge from cardiology stand point    Acute systolic heart failure ([facility] [address] )  Assessment & Plan  Wt Readings from Last 3 Encounters:   07/01/22 121 kg (266 lb 12 1 oz)   06/28/22 124 kg (272 lb 4 3 oz)   11/16/21 124 kg (274 lb)     · Likely brought on by afib  · MIKO 6/23: EF 35% with dilated LV and moderate global hypokinesis, moderate MR and mild TR  · Cath as noted above  · Appears to be euvolemic, continue lasix 40mg PO BID  · Monitor volume status with IVF  · On goal directed therapy with ASA, beta, statin, and ACEI        PNA (pneumonia)  Assessment & Plan  · CTA chest 6/22: "patchy groundglass patchy airspace disease throughout RUL consistent with acute infiltrate"   · S/p 4 days of IV ceftriaxone and 1 day azithromycin, completed antibiotics on PO vantin with last dose 6/29    VASILE (acute kidney injury) ([facility] [address] )  Assessment & Plan  · Baseline creatinine approximately 1 1-1 2; today is 1 47  · Likely due to hypoperfusion due to afib RVR, acite CHF  · Nephrology consulted, optimized for cardiac cath  · Avoid nephrotoxins, hypotension  · Monitor on daily labs; current creatinine may represent patient's new baseline    Alcohol abuse  Assessment & Plan  · CIWA dc'd, out of withdrawal window  · Continue thiamine, folic acid, multivitamin  · Importance of cessation discussed with patient    Hypertension  Assessment & Plan  · BP WNL      Discharging Physician / Practitioner: Sapphire Alcaraz MD  PCP: Ric Garcia MD  Admission Date:   Admission Orders (From admission, onward)     Ordered        06/28/22 2149  Inpatient Admission  Once                      Discharge Date: 07/1/22    Medical Problems             Resolved Problems  Date Reviewed: 6/29/2022   None                 Consultations During Hospital Stay:  · Cardiology  · EP  · Nephrology    Procedures Performed:   · CXR  · CTA chest  · CT abd/pelvis  · US right upper quad  · Cardioversion  · 3D MIKO  · EKF  · Cardiac PCI  · Cardiac Cath    Significant Findings / Test Results:   Cardiac Cath:  "Proximal LAD 90% severely calcified stenosis  (unable to advanced IVUS through lesion)  Mid circumflex discrete 30% stenosis  Mid RCA discrete 75-80% stenosis"    Subsequent PCI performed; see cardiology report of 6/29/22      MIKO:  "Left Ventricle: Left ventricular cavity size is dilated  Wall thickness is normal  There is eccentric hypertrophy  The left ventricular ejection fraction is 40%  Systolic function is moderately reduced  There is moderate global hypokinesis, most significantly at the anterior wall    Right Ventricle: Right ventricular cavity size is dilated  Systolic function is mildly reduced "      Incidental Findings:   · VASILE  · Transaminitis  · Dermatitis     Test Results Pending at Discharge (will require follow up): · None     Outpatient Tests Requested:  · CBC,CMP    Complications:  none    Reason for Admission: Palpitations with SOB x 4 days    Hospital Course: As per HPI:    "Carina Phoenix is a 46 y o  male who presented to ED in AF w/ RVR with rates in 170s  Sx of palpitations and SOB x4 d at home, chronically experiences AL, orthopnea and intermittent leg swelling at home  No prior echos, does not see a cardiologist, sees PCP occasionally  Father with hx of MI, AF, CHF  Given 1L of crystalloid and cardizem bolus and gtt in ED  Given 5mg lopressor IV and 40mg Lasix IV  D dimer in ED elevated at 1 56  "    As per transfer HPI:    "Carina Phoenix is a 46 y o  male with a PMH of HTN, smoking, HLD who presents tp [facility] with shortness of breath and palipations x4days  Pt found to be in afib with rvr at a rate of 176  BP was stable  ED started IV cardizem and heparin drip  Pt was then switched to amiodarone and had significant hypotension, was transferred to the ICU for pressors  He was evaluated by cardiology who planned for eventual cardioversion and cardiac cath   Cardioversion was unsuccessful, cath showed 90% calcified occlusion of proximal LAD  Pt transferred to [facility] for cardiac cath with arterectomy, potential cardioversion vs ablation vs EP eval "      Condition at Discharge: stable     Discharge Day Visit / Exam:       Vitals: Blood Pressure: 101/68 (07/01/22 0813)  Pulse: 72 (07/01/22 0813)  Temperature: 98 °F (36 7 °C) (07/01/22 0813)  Temp Source: Oral (07/01/22 0813)  Respirations: 14 (07/01/22 0813)  Height: 6' 2" (188 cm) (06/30/22 1100)  Weight - Scale: 121 kg (266 lb 12 1 oz) (07/01/22 0556)  SpO2: 96 % (07/01/22 0813)        Discharge instructions/Information to patient and family:   See after visit summary for information provided to patient and family  Provisions for Follow-Up Care:  See after visit summary for information related to follow-up care and any pertinent home health orders  Disposition:     Home    For Discharges to [facility] SNF:   · Not Applicable to this Patient - Not Applicable to this Patient    Planned Readmission: None     Discharge Statement:  I spent 40 minutes discharging the patient  This time was spent on the day of discharge  I had direct contact with the patient on the day of discharge  Greater than 50% of the total time was spent examining patient, answering all patient questions, arranging and discussing plan of care with patient as well as directly providing post-discharge instructions  Additional time then spent on discharge activities  Discharge Medications:  See after visit summary for reconciled discharge medications provided to patient and family        ** Please Note: This note has been constructed using a voice recognition system **

## 2022-07-19 ENCOUNTER — OFFICE VISIT (OUTPATIENT)
Dept: FAMILY MEDICINE CLINIC | Facility: CLINIC | Age: 52
End: 2022-07-19
Payer: COMMERCIAL

## 2022-07-19 VITALS
HEART RATE: 71 BPM | SYSTOLIC BLOOD PRESSURE: 124 MMHG | BODY MASS INDEX: 34.83 KG/M2 | TEMPERATURE: 97.9 F | WEIGHT: 271.4 LBS | HEIGHT: 74 IN | DIASTOLIC BLOOD PRESSURE: 80 MMHG | OXYGEN SATURATION: 97 %

## 2022-07-19 DIAGNOSIS — I50.21 ACUTE SYSTOLIC HEART FAILURE (HCC): ICD-10-CM

## 2022-07-19 DIAGNOSIS — N17.9 AKI (ACUTE KIDNEY INJURY) (HCC): ICD-10-CM

## 2022-07-19 DIAGNOSIS — I48.91 ATRIAL FIBRILLATION, UNSPECIFIED TYPE (HCC): Primary | ICD-10-CM

## 2022-07-19 DIAGNOSIS — I25.10 CORONARY ARTERY DISEASE: ICD-10-CM

## 2022-07-19 DIAGNOSIS — Z12.11 ENCOUNTER FOR SCREENING COLONOSCOPY: ICD-10-CM

## 2022-07-19 PROBLEM — J18.9 PNA (PNEUMONIA): Status: RESOLVED | Noted: 2022-06-22 | Resolved: 2022-07-19

## 2022-07-19 PROCEDURE — 1111F DSCHRG MED/CURRENT MED MERGE: CPT | Performed by: FAMILY MEDICINE

## 2022-07-19 PROCEDURE — 99214 OFFICE O/P EST MOD 30 MIN: CPT | Performed by: FAMILY MEDICINE

## 2022-07-19 RX ORDER — FUROSEMIDE 40 MG/1
TABLET ORAL
Qty: 38 TABLET | Refills: 3
Start: 2022-07-19 | End: 2022-07-26 | Stop reason: SDUPTHER

## 2022-07-19 NOTE — PROGRESS NOTES
BMI Counseling: Body mass index is 34 85 kg/m²  The BMI is above normal  Nutrition recommendations include reducing portion sizes, decreasing overall calorie intake and 3-5 servings of fruits/vegetables daily  Exercise recommendations include exercising 3-5 times per week

## 2022-07-19 NOTE — PROGRESS NOTES
Assessment/Plan:    No problem-specific Assessment & Plan notes found for this encounter  Diagnoses and all orders for this visit:    Atrial fibrillation, unspecified type ([facility] [address] )  Rate controlled with medications  F/U with EP cardiology    Coronary artery disease  denies any chest pain  Continue beta blocker, anticoagulation and statin  -     furosemide (LASIX) 40 mg tablet; (40 mg total) daily  Acute systolic heart failure (HCC)  Limit fluid intake to less than 2 liters per day  Limit sodium intake to less than 2 grams per day    VASILE (acute kidney injury) ([facility] [address] )  Avoid NSAID'S  Recommend moderate hydration given CHF systolic  57%    Encounter for screening colonoscopy  -     Ambulatory referral for colonoscopy; Future      Follow up in 6 months or as needed    Subjective:      Patient ID: Ozzy Cervantes is a 46 y o  male  Patient is here for TCM 06/28 to 07/01 at [address] [place] [address] Prem is a 46 y  o  male who presented to ED in AF w/ RVR with rates in 170s  Sx of palpitations and SOB x4 d at home, chronically experiences AL, orthopnea and intermittent leg swelling at home  No prior echos, does not see a cardiologist, sees PCP occasionally  Father with hx of MI, AF, CHF  Given 1L of crystalloid and cardizem bolus and gtt in ED  Given 5mg lopressor IV and 40mg Lasix IV  D dimer in ED elevated at 1 56      Eugene Prem is a 46 y  o  male with a PMH of HTN, smoking, HLD who presents tp [facility] with shortness of breath and palipations x4days   Pt found to be in afib with rvr at a rate of 176   BP was stable   ED started IV cardizem and heparin drip  Pt was then switched to amiodarone and had significant hypotension, was transferred to the ICU for pressors  He was evaluated by cardiology who planned for eventual cardioversion and cardiac cath   Cardioversion was unsuccessful, cath showed 90% calcified occlusion of proximal LAD X 2 stents   Pt transferred to [facility] for cardiac cath with arterectomy, potential cardioversion vs ablation vs EP evaluation  His kidney function was elevated 1 47  he denies any urinary symptoms  He deneis any chest pain or SOB does have palpaitiaiotn sat benjamin denies any leg swelling taking furosemide once daily  The following portions of the patient's history were reviewed and updated as appropriate:   He  has a past medical history of Chest pain (9/25/2014), Eczema, Hidradenitis suppurativa (2/14/2018), and Rib pain (1/28/2015)  He   Patient Active Problem List    Diagnosis Date Noted    Transaminitis 07/01/2022    Coronary artery disease 06/27/2022    Contact dermatitis 06/27/2022    Pre-diabetes 74/95/5473    Acute systolic heart failure ([facility] ) 06/25/2022    Morbid obesity ([facility] ) 06/25/2022    Hyperlipidemia 06/22/2022    CHF (congestive heart failure) ([facility] ) 06/22/2022    VASILE (acute kidney injury) ([facility] ) 06/22/2022    Atrial fibrillation ([facility] ) 06/21/2022    Chronic cough 11/16/2021    Smoker 06/01/2020    Hypertension 06/01/2020    Alcohol abuse 06/01/2020    Psoriasis 08/26/2019    Hydradenitis 02/14/2018    Carpal tunnel syndrome 09/25/2014    Depression with anxiety 09/25/2014     He  has a past surgical history that includes Cardiac catheterization (Left, 6/27/2022); Cardiac catheterization (N/A, 6/27/2022); Cardiac catheterization (Left, 6/27/2022); Cardiac catheterization (N/A, 6/29/2022); Cardiac catheterization (N/A, 6/29/2022); Cardiac catheterization (6/29/2022); and Cardiac catheterization (Left, 6/29/2022)  His family history includes Heart disease in his father; Mental illness in his father and mother; Substance Abuse in his father and mother  He  reports that he has quit smoking  He has a 20 00 pack-year smoking history  He has quit using smokeless tobacco  He reports current alcohol use  He reports that he does not use drugs    Current Outpatient Medications   Medication Sig Dispense Refill    apixaban (Eliquis) 5 mg Take 1 tablet (5 mg total) by mouth 2 (two) times a day 90 tablet 3    aspirin (ECOTRIN LOW STRENGTH) 81 mg EC tablet Take 1 tablet (81 mg total) by mouth daily for 20 days 20 tablet 0    atorvastatin (LIPITOR) 40 mg tablet Take 1 tablet (40 mg total) by mouth daily with dinner 30 tablet 0    clopidogrel (PLAVIX) 75 mg tablet Take 1 tablet (75 mg total) by mouth daily 30 tablet 0    digoxin (LANOXIN) 0 125 mg tablet Take 1 tablet (125 mcg total) by mouth daily 30 tablet 3    furosemide (LASIX) 40 mg tablet (40 mg total) daily  38 tablet 3    lisinopril (ZESTRIL) 5 mg tablet Take 1 tablet (5 mg total) by mouth daily 30 tablet 0    metoprolol succinate (TOPROL-XL) 50 mg 24 hr tablet Take 1 tablet (50 mg total) by mouth 2 (two) times a day 60 tablet 0     No current facility-administered medications for this visit  Current Outpatient Medications on File Prior to Visit   Medication Sig    apixaban (Eliquis) 5 mg Take 1 tablet (5 mg total) by mouth 2 (two) times a day    aspirin (ECOTRIN LOW STRENGTH) 81 mg EC tablet Take 1 tablet (81 mg total) by mouth daily for 20 days    atorvastatin (LIPITOR) 40 mg tablet Take 1 tablet (40 mg total) by mouth daily with dinner    clopidogrel (PLAVIX) 75 mg tablet Take 1 tablet (75 mg total) by mouth daily    digoxin (LANOXIN) 0 125 mg tablet Take 1 tablet (125 mcg total) by mouth daily    lisinopril (ZESTRIL) 5 mg tablet Take 1 tablet (5 mg total) by mouth daily    metoprolol succinate (TOPROL-XL) 50 mg 24 hr tablet Take 1 tablet (50 mg total) by mouth 2 (two) times a day    [DISCONTINUED] furosemide (LASIX) 40 mg tablet Take 1 tablet (40 mg total) by mouth 2 (two) times a day for 4 days, THEN 1 tablet (40 mg total) daily  (Patient taking differently: (40 mg total) daily )     No current facility-administered medications on file prior to visit  He is allergic to amiodarone       Review of Systems   Constitutional: Negative for activity change, appetite change, fatigue and fever  HENT: Negative for congestion and ear discharge  Respiratory: Negative for cough and shortness of breath  Cardiovascular: Positive for palpitations  Negative for chest pain  Gastrointestinal: Negative for diarrhea and nausea  Musculoskeletal: Negative for arthralgias and back pain  Skin: Negative for color change and rash  Neurological: Negative for dizziness and headaches  Psychiatric/Behavioral: Negative for agitation and behavioral problems  Objective:      /80 (BP Location: Left arm, Patient Position: Sitting)   Pulse 71   Temp 97 9 °F (36 6 °C) (Temporal)   Ht 6' 2" (1 88 m)   Wt 123 kg (271 lb 6 4 oz)   SpO2 97%   BMI 34 85 kg/m²          Physical Exam  Constitutional:       General: He is not in acute distress  Appearance: He is well-developed  He is not diaphoretic  Eyes:      General: No scleral icterus  Pupils: Pupils are equal, round, and reactive to light  Cardiovascular:      Rate and Rhythm: Normal rate and regular rhythm  Heart sounds: Normal heart sounds  No murmur heard  Pulmonary:      Effort: Pulmonary effort is normal  No respiratory distress  Breath sounds: Normal breath sounds  No wheezing  Abdominal:      General: Bowel sounds are normal  There is no distension  Palpations: Abdomen is soft  Tenderness: There is no abdominal tenderness  Skin:     General: Skin is warm and dry  Findings: No rash  Neurological:      Mental Status: He is alert and oriented to person, place, and time

## 2022-07-26 ENCOUNTER — TELEPHONE (OUTPATIENT)
Dept: CARDIAC REHAB | Facility: CLINIC | Age: 52
End: 2022-07-26

## 2022-07-26 DIAGNOSIS — I48.19 PERSISTENT ATRIAL FIBRILLATION (HCC): ICD-10-CM

## 2022-07-26 DIAGNOSIS — I25.10 CORONARY ARTERY DISEASE: ICD-10-CM

## 2022-07-26 RX ORDER — FUROSEMIDE 40 MG/1
TABLET ORAL
Qty: 38 TABLET | Refills: 0 | Status: SHIPPED | OUTPATIENT
Start: 2022-07-26 | End: 2022-08-27 | Stop reason: SDUPTHER

## 2022-07-26 RX ORDER — DIGOXIN 125 MCG
125 TABLET ORAL DAILY
Qty: 30 TABLET | Refills: 1 | Status: SHIPPED | OUTPATIENT
Start: 2022-07-26 | End: 2022-10-10

## 2022-07-26 NOTE — TELEPHONE ENCOUNTER
LMOM about $100 copay for cardiac rehab and reminder of cardiac rehab evaluation  Department number was left for call back

## 2022-07-29 DIAGNOSIS — I25.10 CORONARY ARTERY DISEASE: ICD-10-CM

## 2022-07-29 DIAGNOSIS — I48.91 ATRIAL FIBRILLATION, UNSPECIFIED TYPE (HCC): ICD-10-CM

## 2022-08-01 DIAGNOSIS — I48.91 ATRIAL FIBRILLATION, UNSPECIFIED TYPE (HCC): ICD-10-CM

## 2022-08-01 RX ORDER — METOPROLOL SUCCINATE 50 MG/1
50 TABLET, EXTENDED RELEASE ORAL 2 TIMES DAILY
Qty: 60 TABLET | Refills: 11 | Status: SHIPPED | OUTPATIENT
Start: 2022-08-01 | End: 2022-08-27 | Stop reason: SDUPTHER

## 2022-08-01 RX ORDER — LISINOPRIL 5 MG/1
5 TABLET ORAL DAILY
Qty: 30 TABLET | Refills: 11 | Status: SHIPPED | OUTPATIENT
Start: 2022-08-01 | End: 2022-08-27 | Stop reason: SDUPTHER

## 2022-08-01 RX ORDER — CLOPIDOGREL BISULFATE 75 MG/1
75 TABLET ORAL DAILY
Qty: 30 TABLET | Refills: 11 | Status: SHIPPED | OUTPATIENT
Start: 2022-08-01 | End: 2022-08-27 | Stop reason: SDUPTHER

## 2022-08-01 RX ORDER — ATORVASTATIN CALCIUM 40 MG/1
40 TABLET, FILM COATED ORAL
Qty: 30 TABLET | Refills: 11 | Status: SHIPPED | OUTPATIENT
Start: 2022-08-01 | End: 2022-08-27 | Stop reason: SDUPTHER

## 2022-08-18 ENCOUNTER — OFFICE VISIT (OUTPATIENT)
Dept: CARDIOLOGY CLINIC | Facility: CLINIC | Age: 52
End: 2022-08-18
Payer: COMMERCIAL

## 2022-08-18 VITALS
HEART RATE: 97 BPM | DIASTOLIC BLOOD PRESSURE: 70 MMHG | BODY MASS INDEX: 34.97 KG/M2 | SYSTOLIC BLOOD PRESSURE: 110 MMHG | RESPIRATION RATE: 16 BRPM | HEIGHT: 74 IN | WEIGHT: 272.5 LBS

## 2022-08-18 DIAGNOSIS — I48.91 ATRIAL FIBRILLATION, UNSPECIFIED TYPE (HCC): ICD-10-CM

## 2022-08-18 DIAGNOSIS — I48.19 PERSISTENT ATRIAL FIBRILLATION (HCC): Primary | ICD-10-CM

## 2022-08-18 PROCEDURE — 93000 ELECTROCARDIOGRAM COMPLETE: CPT | Performed by: INTERNAL MEDICINE

## 2022-08-18 PROCEDURE — 99215 OFFICE O/P EST HI 40 MIN: CPT | Performed by: INTERNAL MEDICINE

## 2022-08-18 NOTE — PROGRESS NOTES
Cardiology Follow Up    Devika Kimbrough  1970  [id number]  [address]  [facility]  [address]  [phone]  [phone]    1  Persistent atrial fibrillation ([address] )  POCT ECG    Ambulatory referral to Cardiac Electrophysiology   2  Atrial fibrillation, unspecified type ([address] )  POCT ECG       HPI:   Devika Kimbrough is a 46 y o  man with new cardiomyopathy in setting of new atrial fibrillation, HTN, CAD who presents to discuss management of atrial fibrillation  He had dyspnea on exertion, palpitations for 4 days prior to the emergency room visit in June 2022  He was noted to be in atrial fibrillation with RVR and rates were elevated up to 170 beats per minute  Cardioversion was performed on June 23rd however it was unsuccessful despite administrating 200 joules, 300 joules and another 300 joules  He was therefore started on IV amiodarone loading  He had left heart catheterization which showed 90% left main disease, prox LAD 90% and RCA 75-80%  He was also noted to be in VASILE on CKD stage 2  He also had pneumonia that was treated with antibiotics  He went who have another cardioversion on June 30th after amiodarone loading which was successful with 300 joule  However he had rash on his torso and amiodarone was discontinued  He did receive oral steroid as well  He also received drug-eluting stent to prox LAD and mid RCA during the catheterization on 06/29/2022  During the 2nd catheterization left main disease was not noted  He was seen in general cardiology clinic on 7/7/2022 and was noted to be back in atrial fibrillation  He currently feels fatigue with exertion but otherwise denies dyspnea on exertion, chest pain, palpitations, swelling in lower extremity, orthopnea, PND or syncope  He did notice weight gain occurring over the past several days    He had not taken furosemide but did start taking it last night  He overall feels significantly better compared to his hospital visit in June  Denies drinking alcohol and quit smoking several months ago  Patient Active Problem List   Diagnosis    Hydradenitis    Carpal tunnel syndrome    Depression with anxiety    Psoriasis    Smoker    Hypertension    Alcohol abuse    Chronic cough    Atrial fibrillation (HCC)    Hyperlipidemia    [facility]est[facility]    VASILE [facility]acute kidney injury) [facility] )    Acute systolic heart failure (HCC)    Morbid obesity (HCC)    Coronary artery disease    Contact dermatitis    Pre-diabetes    Transaminitis     Past Medical History:   Diagnosis Date    Chest pain 9/25/2014    Eczema     Hidradenitis suppurativa 2/14/2018    Rib pain 1/28/2015     Social History     Socioeconomic History    Marital status: Single     Spouse name: Not on file    Number of children: Not on file    Years of education: Not on file    Highest education level: Not on file   Occupational History    Occupation:     Tobacco Use    Smoking status: Former Smoker     Packs/day: 1 00     Years: 20 00     Pack years: 20 00    Smokeless tobacco: Former User   Vaping Use    Vaping Use: Never used   Substance and Sexual Activity    Alcohol use: Yes     Comment: social; moderate    Drug use: No    Sexual activity: Yes     Partners: Female     Birth control/protection: Condom Male     Comment: single    Other Topics Concern    Not on file   Social History Narrative    Not on file     Social Determinants of Health     Financial Resource Strain: Not on file   Food Insecurity: No Food Insecurity    Worried About Running Out of Food in the Last Year: Never true    Jade of Food in the Last Year: Never true   Transportation Needs: No Transportation Needs    Lack of Transportation (Medical): No    Lack of Transportation (Non-Medical):  No   Physical Activity: Not on file   Stress: Not on file   Social Connections: Not on file   Intimate Partner Violence: Not on file   Housing Stability: Low Risk     Unable to Pay for Housing in the Last Year: No    Number of Places Lived in the Last Year: 1    Unstable Housing in the Last Year: No      Family History   Problem Relation Age of Onset    Mental illness Mother         denied    Substance Abuse Mother         denied    Mental illness Father         denied    Substance Abuse Father         denied    Heart disease Father         cardiac disorder     Past Surgical History:   Procedure Laterality Date    CARDIAC CATHETERIZATION Left 6/27/2022    Procedure: Cardiac catheterization;  Surgeon: Jennifer Ruffin MD;  Location: [address] [place] [address] CATH LAB; Service: Cardiology    CARDIAC CATHETERIZATION N/A 6/27/2022    Procedure: Cardiac Coronary Angiogram;  Surgeon: Jennifer Ruffin MD;  Location: [address] [place] [address] CATH LAB; Service: Cardiology    CARDIAC CATHETERIZATION Left 6/27/2022    Procedure: Cardiac Left Heart Cath;  Surgeon: Jennifer Ruffin MD;  Location: [address] [place] [address] CATH LAB; Service: Cardiology    CARDIAC CATHETERIZATION N/A 6/29/2022    Procedure: Cardiac pci;  Surgeon: Ashok Early MD;  [facility]; Service: Cardiology    CARDIAC CATHETERIZATION N/A 6/29/2022    Procedure: Cardiac Coronary Angiogram;  Surgeon: Ashok Early MD;  [facility]; Service: Cardiology    CARDIAC CATHETERIZATION  6/29/2022    Procedure: Cardiac catheterization;  Surgeon: Ashok Early MD;  [facility]; Service: Cardiology    CARDIAC CATHETERIZATION Left 6/29/2022    Procedure: Cardiac Left Heart Cath;  Surgeon: Ashok Early MD;  [facility];   Service: Cardiology       Current Outpatient Medications:     apixaban (Eliquis) 5 mg, Take 1 tablet (5 mg total) by mouth 2 (two) times a day, Disp: 90 tablet, Rfl: 0    atorvastatin (LIPITOR) 40 mg tablet, Take 1 tablet (40 mg total) by mouth daily with dinner, Disp: 30 tablet, Rfl: 11    clopidogrel (PLAVIX) 75 mg tablet, Take 1 tablet (75 mg total) by mouth daily, Disp: 30 tablet, Rfl: 11    digoxin (LANOXIN) 0 125 mg tablet, Take 1 tablet (125 mcg total) by mouth daily, Disp: 30 tablet, Rfl: 1    furosemide (LASIX) 40 mg tablet, (40 mg total) daily  , Disp: 38 tablet, Rfl: 0    lisinopril (ZESTRIL) 5 mg tablet, Take 1 tablet (5 mg total) by mouth daily, Disp: 30 tablet, Rfl: 11    metoprolol succinate (TOPROL-XL) 50 mg 24 hr tablet, Take 1 tablet (50 mg total) by mouth 2 (two) times a day, Disp: 60 tablet, Rfl: 11    aspirin (ECOTRIN LOW STRENGTH) 81 mg EC tablet, Take 1 tablet (81 mg total) by mouth daily for 20 days, Disp: 20 tablet, Rfl: 0  Allergies   Allergen Reactions    Amiodarone Rash         Review of Systems:  Review of Systems   Constitutional: Positive for fatigue  Negative for chills and fever  HENT: Negative  Eyes: Negative  Negative for discharge  Respiratory: Negative  Negative for chest tightness, shortness of breath and wheezing  Cardiovascular: Negative  Negative for chest pain, palpitations and leg swelling  Gastrointestinal: Negative  Negative for abdominal pain, nausea and vomiting  Endocrine: Negative  Genitourinary: Negative  Musculoskeletal: Negative  Negative for arthralgias  Skin: Negative  Negative for rash  Allergic/Immunologic: Negative  Neurological: Negative  Negative for dizziness and light-headedness  Hematological: Negative  Psychiatric/Behavioral: Negative  Objective:   /70   Pulse 97   Resp 16   Ht 6' 2" (1 88 m)   Wt 124 kg (272 lb 8 oz)   BMI 34 99 kg/m²    Physical Exam:  GEN: NAD, Alert and oriented, well appearing  HEENT:Head, neck, ears, oral pharynx: Mucus membranes moist, oral pharynx clear, nares clear   External ears normal  EYES: Pupils equal, sclera anicteric  NECK: No JVD  CARDIOVASCULAR:  Irregularly irregular, No murmur, rub, gallops S1,S2  LUNGS: Clear To auscultation bilaterally  ABDOMEN: Soft, nondistended  EXTREMITIES/VASCULAR: No edema  PSYCH: Normal Affect  NEURO: Grossly intact, moving all extremiteis equal, face symetric  HEME: No bleeding, bruising, petechia  SKIN: No significant rashes       Labs & Results:  Below is the patient's most recent value for Albumin, ALT, AST, BUN, Calcium, Chloride, Cholesterol, CO2, Creatinine, GFR, Glucose, HDL, Hematocrit, Hemoglobin, Hemoglobin A1C, LDL, Magnesium, Phosphorus, Platelets, Potassium, PSA, Sodium, Triglycerides, and WBC  Lab Results   Component Value Date     (H) 07/01/2022    AST 49 (H) 07/01/2022    BUN 16 07/01/2022    CALCIUM 8 9 07/01/2022     (H) 07/01/2022    CHOL 185 10/03/2014    CO2 24 07/01/2022    CREATININE 1 47 (H) 07/01/2022    HDL 34 (L) 06/23/2022    HCT 35 8 (L) 07/01/2022    HGB 11 3 (L) 07/01/2022    HGBA1C 5 9 (H) 06/22/2022    MG 2 5 07/01/2022    PHOS 3 8 07/01/2022     07/01/2022    K 4 4 07/01/2022    PSA 0 9 12/16/2021     10/03/2014    TRIG 77 06/23/2022    WBC 7 65 07/01/2022     Note: for a comprehensive list of the patient's lab results, access the Results Review activity  Cardiac testing:     I personally reviewed the ECG performed in the clinic on 08/18/22  It reveals atrial fibrillation with ventricular rate of 97 beats per minute  Echocardiograms:  No results found for this or any previous visit  No results found for this or any previous visit  Catheterizations:   No results found for this or any previous visit  Stress Tests:  No results found for this or any previous visit  Holter monitor -   No results found for this or any previous visit  No results found for this or any previous visit  Discussion/Summary:  1   Atrial fibrillation   Patient is having persistent atrial fibrillation   He was given amiodarone in the hospital but had rash   He did feel better in sinus rhythm after cardioversion but at a follow-up visit he was back in atrial fibrillation   Given symptoms he would be better off with normal rhythm  Discussed rhythm control strategy with antiarrhythmic therapy (sotalol, Tikosyn) vs  Ablation procedure  He will call and let us know which option he would like to proceed with  In the meantime continue with rate control strategy with metoprolol and digoxin   Continue with anticoagulation with Eliquis  Sleep medicine referral has been provided    2  CAD   Patient had drug-eluting stent to LAD and RCA placed in June   Maintained on Plavix, aspirin and Lipitor  Denies angina     3  Systolic congestive heart failure  NYHA class 2 to symptoms   Patient had ejection fraction of 35% which improved to 40% on MIKO   Maintained on metoprolol, lisinopril   Feels weight gain due to fluid overload   Started taking Lasix last night   Obtain BMP in 1 week    4   Hyperlipidemia   Maintained on Lipitor daily    Follow-up in 3 months

## 2022-08-18 NOTE — PATIENT INSTRUCTIONS
Obtain blood work in 1 months    Let us know if you want to have ablation for atrial fibrillation or take medication for atrial fibrillation (sotalol, Tikosyn)

## 2022-08-27 DIAGNOSIS — I25.10 CORONARY ARTERY DISEASE: ICD-10-CM

## 2022-08-27 DIAGNOSIS — I48.19 PERSISTENT ATRIAL FIBRILLATION (HCC): ICD-10-CM

## 2022-08-27 DIAGNOSIS — I48.91 ATRIAL FIBRILLATION, UNSPECIFIED TYPE (HCC): ICD-10-CM

## 2022-08-29 RX ORDER — FUROSEMIDE 40 MG/1
TABLET ORAL
Qty: 38 TABLET | Refills: 0 | Status: SHIPPED | OUTPATIENT
Start: 2022-08-29 | End: 2022-09-26 | Stop reason: SDUPTHER

## 2022-08-31 RX ORDER — CLOPIDOGREL BISULFATE 75 MG/1
75 TABLET ORAL DAILY
Qty: 30 TABLET | Refills: 0 | Status: SHIPPED | OUTPATIENT
Start: 2022-08-31 | End: 2022-10-28

## 2022-08-31 RX ORDER — LISINOPRIL 5 MG/1
5 TABLET ORAL DAILY
Qty: 30 TABLET | Refills: 0 | Status: SHIPPED | OUTPATIENT
Start: 2022-08-31 | End: 2022-10-28

## 2022-08-31 RX ORDER — ATORVASTATIN CALCIUM 40 MG/1
40 TABLET, FILM COATED ORAL
Qty: 30 TABLET | Refills: 0 | Status: SHIPPED | OUTPATIENT
Start: 2022-08-31 | End: 2022-10-28

## 2022-08-31 RX ORDER — METOPROLOL SUCCINATE 50 MG/1
50 TABLET, EXTENDED RELEASE ORAL 2 TIMES DAILY
Qty: 60 TABLET | Refills: 0 | Status: SHIPPED | OUTPATIENT
Start: 2022-08-31 | End: 2022-10-28

## 2022-09-06 ENCOUNTER — TELEPHONE (OUTPATIENT)
Dept: CARDIOLOGY CLINIC | Facility: CLINIC | Age: 52
End: 2022-09-06

## 2022-09-06 DIAGNOSIS — I48.19 PERSISTENT ATRIAL FIBRILLATION (HCC): Primary | ICD-10-CM

## 2022-09-07 NOTE — TELEPHONE ENCOUNTER
Dofetilide 500 mcg BID- no prior authorization required    60 for 30 $5 00   180 for 90 Mail order $10 00    Sotalol 160 mg BID no prior authorization required    60 for 30 $ 5 00   180 For 90 mail order $10 00  [employer]   [phone]

## 2022-09-07 NOTE — TELEPHONE ENCOUNTER
Chen Fam/Esther,    Please schedule this patient for MIKO/atrial fibrillation ablation and sotalol initiation       Tatiana - please webster check sotalol 120 mg q12h and dofetilide 500 mcg q12h    Routine labs    Hold medication: None    System/Device company: [employer] - please put in case request      Thank you

## 2022-09-26 DIAGNOSIS — I25.10 CORONARY ARTERY DISEASE: ICD-10-CM

## 2022-09-26 RX ORDER — FUROSEMIDE 40 MG/1
TABLET ORAL
Qty: 38 TABLET | Refills: 0 | Status: SHIPPED | OUTPATIENT
Start: 2022-09-26 | End: 2022-10-24

## 2022-10-06 NOTE — TELEPHONE ENCOUNTER
Patient scheduled for MIKO/A fib/dofetilide admission at [facility] on 11/17/22 with Dr Shi  Patient aware of general instructions, labs and COVID test required    Eliquis no holds    Lasix: Hold the AM of  Can we please check insurance for approval      Dr Carlie Boeck, patient wants to be scheduled on a Thursday and stay at the hospital for the med admission in this way he don't have to take many day's from work  I hope this is ok  Thank you

## 2022-10-08 DIAGNOSIS — I48.19 PERSISTENT ATRIAL FIBRILLATION (HCC): ICD-10-CM

## 2022-10-10 DIAGNOSIS — I48.19 PERSISTENT ATRIAL FIBRILLATION (HCC): Primary | ICD-10-CM

## 2022-10-10 RX ORDER — DIGOXIN 125 MCG
125 TABLET ORAL DAILY
Qty: 30 TABLET | Refills: 0 | Status: SHIPPED | OUTPATIENT
Start: 2022-10-10

## 2022-10-10 RX ORDER — DIGOXIN 125 MCG
TABLET ORAL
Qty: 30 TABLET | Refills: 0 | Status: SHIPPED | OUTPATIENT
Start: 2022-10-10 | End: 2022-10-10

## 2022-10-20 DIAGNOSIS — I48.91 ATRIAL FIBRILLATION, UNSPECIFIED TYPE (HCC): ICD-10-CM

## 2022-10-23 DIAGNOSIS — I48.91 ATRIAL FIBRILLATION, UNSPECIFIED TYPE (HCC): ICD-10-CM

## 2022-10-23 DIAGNOSIS — I25.10 CORONARY ARTERY DISEASE: ICD-10-CM

## 2022-10-24 RX ORDER — FUROSEMIDE 40 MG/1
TABLET ORAL
Qty: 38 TABLET | Refills: 0 | Status: SHIPPED | OUTPATIENT
Start: 2022-10-24

## 2022-10-28 RX ORDER — CLOPIDOGREL BISULFATE 75 MG/1
TABLET ORAL
Qty: 30 TABLET | Refills: 0 | Status: SHIPPED | OUTPATIENT
Start: 2022-10-28

## 2022-10-28 RX ORDER — METOPROLOL SUCCINATE 50 MG/1
TABLET, EXTENDED RELEASE ORAL
Qty: 60 TABLET | Refills: 0 | Status: SHIPPED | OUTPATIENT
Start: 2022-10-28

## 2022-10-28 RX ORDER — APIXABAN 5 MG/1
TABLET, FILM COATED ORAL
Qty: 90 TABLET | Refills: 0 | Status: SHIPPED | OUTPATIENT
Start: 2022-10-28

## 2022-10-28 RX ORDER — LISINOPRIL 5 MG/1
TABLET ORAL
Qty: 30 TABLET | Refills: 0 | Status: SHIPPED | OUTPATIENT
Start: 2022-10-28

## 2022-10-28 RX ORDER — ATORVASTATIN CALCIUM 40 MG/1
TABLET, FILM COATED ORAL
Qty: 30 TABLET | Refills: 0 | Status: SHIPPED | OUTPATIENT
Start: 2022-10-28

## 2022-11-11 ENCOUNTER — APPOINTMENT (OUTPATIENT)
Dept: LAB | Facility: MEDICAL CENTER | Age: 52
End: 2022-11-11

## 2022-11-11 DIAGNOSIS — Z95.5 S/P DRUG ELUTING CORONARY STENT PLACEMENT: ICD-10-CM

## 2022-11-11 DIAGNOSIS — I48.19 PERSISTENT ATRIAL FIBRILLATION (HCC): ICD-10-CM

## 2022-11-11 LAB
ALBUMIN SERPL BCP-MCNC: 3.5 G/DL (ref 3.5–5)
ALP SERPL-CCNC: 99 U/L (ref 46–116)
ALT SERPL W P-5'-P-CCNC: 35 U/L (ref 12–78)
ANION GAP SERPL CALCULATED.3IONS-SCNC: 5 MMOL/L (ref 4–13)
AST SERPL W P-5'-P-CCNC: 23 U/L (ref 5–45)
BASOPHILS # BLD AUTO: 0.08 THOUSANDS/ÂΜL (ref 0–0.1)
BASOPHILS NFR BLD AUTO: 1 % (ref 0–1)
BILIRUB SERPL-MCNC: 0.35 MG/DL (ref 0.2–1)
BUN SERPL-MCNC: 19 MG/DL (ref 5–25)
CALCIUM SERPL-MCNC: 9.2 MG/DL (ref 8.3–10.1)
CHLORIDE SERPL-SCNC: 110 MMOL/L (ref 96–108)
CO2 SERPL-SCNC: 23 MMOL/L (ref 21–32)
CREAT SERPL-MCNC: 1.34 MG/DL (ref 0.6–1.3)
DIGOXIN SERPL-MCNC: 0.3 NG/ML (ref 0.8–2)
EOSINOPHIL # BLD AUTO: 0.12 THOUSAND/ÂΜL (ref 0–0.61)
EOSINOPHIL NFR BLD AUTO: 2 % (ref 0–6)
ERYTHROCYTE [DISTWIDTH] IN BLOOD BY AUTOMATED COUNT: 13.5 % (ref 11.6–15.1)
GFR SERPL CREATININE-BSD FRML MDRD: 60 ML/MIN/1.73SQ M
GLUCOSE P FAST SERPL-MCNC: 104 MG/DL (ref 65–99)
HCT VFR BLD AUTO: 40.5 % (ref 36.5–49.3)
HGB BLD-MCNC: 13.2 G/DL (ref 12–17)
IMM GRANULOCYTES # BLD AUTO: 0.1 THOUSAND/UL (ref 0–0.2)
IMM GRANULOCYTES NFR BLD AUTO: 1 % (ref 0–2)
LYMPHOCYTES # BLD AUTO: 1.93 THOUSANDS/ÂΜL (ref 0.6–4.47)
LYMPHOCYTES NFR BLD AUTO: 27 % (ref 14–44)
MAGNESIUM SERPL-MCNC: 2.1 MG/DL (ref 1.6–2.6)
MCH RBC QN AUTO: 29.4 PG (ref 26.8–34.3)
MCHC RBC AUTO-ENTMCNC: 32.6 G/DL (ref 31.4–37.4)
MCV RBC AUTO: 90 FL (ref 82–98)
MONOCYTES # BLD AUTO: 0.69 THOUSAND/ÂΜL (ref 0.17–1.22)
MONOCYTES NFR BLD AUTO: 10 % (ref 4–12)
NEUTROPHILS # BLD AUTO: 4.18 THOUSANDS/ÂΜL (ref 1.85–7.62)
NEUTS SEG NFR BLD AUTO: 59 % (ref 43–75)
NRBC BLD AUTO-RTO: 0 /100 WBCS
PLATELET # BLD AUTO: 263 THOUSANDS/UL (ref 149–390)
PMV BLD AUTO: 11.8 FL (ref 8.9–12.7)
POTASSIUM SERPL-SCNC: 4.4 MMOL/L (ref 3.5–5.3)
PROT SERPL-MCNC: 7.5 G/DL (ref 6.4–8.4)
RBC # BLD AUTO: 4.49 MILLION/UL (ref 3.88–5.62)
SODIUM SERPL-SCNC: 138 MMOL/L (ref 135–147)
WBC # BLD AUTO: 7.1 THOUSAND/UL (ref 4.31–10.16)

## 2022-11-12 LAB — SARS-COV-2 RNA RESP QL NAA+PROBE: NEGATIVE

## 2022-11-14 ENCOUNTER — TELEPHONE (OUTPATIENT)
Dept: CARDIOLOGY CLINIC | Facility: CLINIC | Age: 52
End: 2022-11-14

## 2022-11-14 NOTE — TELEPHONE ENCOUNTER
----- Message from Gerard Armando MD sent at 11/13/2022  4:34 PM EST -----  Please call the patient about stable lab results  Negative covid results  Thank you

## 2022-11-16 ENCOUNTER — ANESTHESIA EVENT (OUTPATIENT)
Dept: NON INVASIVE DIAGNOSTICS | Facility: HOSPITAL | Age: 52
End: 2022-11-16

## 2022-11-17 ENCOUNTER — ANESTHESIA (OUTPATIENT)
Dept: NON INVASIVE DIAGNOSTICS | Facility: HOSPITAL | Age: 52
End: 2022-11-17

## 2022-11-17 ENCOUNTER — APPOINTMENT (OUTPATIENT)
Dept: NON INVASIVE DIAGNOSTICS | Facility: HOSPITAL | Age: 52
End: 2022-11-17
Attending: INTERNAL MEDICINE

## 2022-11-17 ENCOUNTER — HOSPITAL ENCOUNTER (OUTPATIENT)
Facility: HOSPITAL | Age: 52
Setting detail: OUTPATIENT SURGERY
Discharge: HOME | End: 2022-11-19
Attending: INTERNAL MEDICINE | Admitting: INTERNAL MEDICINE

## 2022-11-17 DIAGNOSIS — I48.19 PERSISTENT ATRIAL FIBRILLATION (HCC): ICD-10-CM

## 2022-11-17 DIAGNOSIS — I31.9 PERICARDITIS: Primary | ICD-10-CM

## 2022-11-17 LAB
ANION GAP SERPL CALCULATED.3IONS-SCNC: 7 MMOL/L (ref 4–13)
ATRIAL RATE: 153 BPM
BASE EXCESS BLDA CALC-SCNC: -4 MMOL/L (ref -2–3)
BASE EXCESS BLDA CALC-SCNC: -5 MMOL/L (ref -2–3)
BASOPHILS # BLD AUTO: 0.08 THOUSANDS/ÂΜL (ref 0–0.1)
BASOPHILS NFR BLD AUTO: 1 % (ref 0–1)
BUN SERPL-MCNC: 22 MG/DL (ref 5–25)
CA-I BLD-SCNC: 1.13 MMOL/L (ref 1.12–1.32)
CA-I BLD-SCNC: 1.18 MMOL/L (ref 1.12–1.32)
CALCIUM SERPL-MCNC: 9.1 MG/DL (ref 8.3–10.1)
CHLORIDE SERPL-SCNC: 110 MMOL/L (ref 96–108)
CO2 SERPL-SCNC: 22 MMOL/L (ref 21–32)
CREAT SERPL-MCNC: 1.28 MG/DL (ref 0.6–1.3)
EOSINOPHIL # BLD AUTO: 0.11 THOUSAND/ÂΜL (ref 0–0.61)
EOSINOPHIL NFR BLD AUTO: 2 % (ref 0–6)
ERYTHROCYTE [DISTWIDTH] IN BLOOD BY AUTOMATED COUNT: 13.6 % (ref 11.6–15.1)
GFR SERPL CREATININE-BSD FRML MDRD: 63 ML/MIN/1.73SQ M
GLUCOSE P FAST SERPL-MCNC: 132 MG/DL (ref 65–99)
GLUCOSE SERPL-MCNC: 132 MG/DL (ref 65–140)
GLUCOSE SERPL-MCNC: 144 MG/DL (ref 65–140)
GLUCOSE SERPL-MCNC: 144 MG/DL (ref 65–140)
HCO3 BLDA-SCNC: 22.2 MMOL/L (ref 24–30)
HCO3 BLDA-SCNC: 23.5 MMOL/L (ref 24–30)
HCT VFR BLD AUTO: 38.5 % (ref 36.5–49.3)
HCT VFR BLD CALC: 33 % (ref 36.5–49.3)
HCT VFR BLD CALC: 35 % (ref 36.5–49.3)
HGB BLD-MCNC: 12.9 G/DL (ref 12–17)
HGB BLDA-MCNC: 11.2 G/DL (ref 12–17)
HGB BLDA-MCNC: 11.9 G/DL (ref 12–17)
IMM GRANULOCYTES # BLD AUTO: 0.05 THOUSAND/UL (ref 0–0.2)
IMM GRANULOCYTES NFR BLD AUTO: 1 % (ref 0–2)
INR PPP: 0.99 (ref 0.84–1.19)
KCT BLD-ACNC: 337 SEC (ref 89–137)
KCT BLD-ACNC: 343 SEC (ref 89–137)
KCT BLD-ACNC: 350 SEC (ref 89–137)
KCT BLD-ACNC: 357 SEC (ref 89–137)
KCT BLD-ACNC: 371 SEC (ref 89–137)
KCT BLD-ACNC: 371 SEC (ref 89–137)
KCT BLD-ACNC: 392 SEC (ref 89–137)
LYMPHOCYTES # BLD AUTO: 1.36 THOUSANDS/ÂΜL (ref 0.6–4.47)
LYMPHOCYTES NFR BLD AUTO: 19 % (ref 14–44)
MCH RBC QN AUTO: 29.8 PG (ref 26.8–34.3)
MCHC RBC AUTO-ENTMCNC: 33.5 G/DL (ref 31.4–37.4)
MCV RBC AUTO: 89 FL (ref 82–98)
MONOCYTES # BLD AUTO: 0.81 THOUSAND/ÂΜL (ref 0.17–1.22)
MONOCYTES NFR BLD AUTO: 12 % (ref 4–12)
NEUTROPHILS # BLD AUTO: 4.59 THOUSANDS/ÂΜL (ref 1.85–7.62)
NEUTS SEG NFR BLD AUTO: 65 % (ref 43–75)
NRBC BLD AUTO-RTO: 0 /100 WBCS
PCO2 BLD: 24 MMOL/L (ref 21–32)
PCO2 BLD: 25 MMOL/L (ref 21–32)
PCO2 BLD: 45.4 MM HG (ref 42–50)
PCO2 BLD: 57.7 MM HG (ref 42–50)
PH BLD: 7.22 [PH] (ref 7.3–7.4)
PH BLD: 7.3 [PH] (ref 7.3–7.4)
PLATELET # BLD AUTO: 256 THOUSANDS/UL (ref 149–390)
PMV BLD AUTO: 11.1 FL (ref 8.9–12.7)
PO2 BLD: 100 MM HG (ref 35–45)
PO2 BLD: 152 MM HG (ref 35–45)
POTASSIUM BLD-SCNC: 5.3 MMOL/L (ref 3.5–5.3)
POTASSIUM BLD-SCNC: 5.4 MMOL/L (ref 3.5–5.3)
POTASSIUM SERPL-SCNC: 4.3 MMOL/L (ref 3.5–5.3)
PROTHROMBIN TIME: 13.3 SECONDS (ref 11.6–14.5)
QRS AXIS: 61 DEGREES
QRSD INTERVAL: 88 MS
QT INTERVAL: 352 MS
QTC INTERVAL: 467 MS
RBC # BLD AUTO: 4.33 MILLION/UL (ref 3.88–5.62)
SAO2 % BLD FROM PO2: 96 % (ref 60–85)
SAO2 % BLD FROM PO2: 99 % (ref 60–85)
SODIUM BLD-SCNC: 139 MMOL/L (ref 136–145)
SODIUM BLD-SCNC: 139 MMOL/L (ref 136–145)
SODIUM SERPL-SCNC: 139 MMOL/L (ref 135–147)
SPECIMEN SOURCE: ABNORMAL
T WAVE AXIS: 88 DEGREES
VENTRICULAR RATE: 106 BPM
WBC # BLD AUTO: 7 THOUSAND/UL (ref 4.31–10.16)

## 2022-11-17 RX ORDER — CEFAZOLIN SODIUM 1 G/3ML
INJECTION, POWDER, FOR SOLUTION INTRAMUSCULAR; INTRAVENOUS AS NEEDED
Status: DISCONTINUED | OUTPATIENT
Start: 2022-11-17 | End: 2022-11-17

## 2022-11-17 RX ORDER — HYDROMORPHONE HCL/PF 1 MG/ML
0.5 SYRINGE (ML) INJECTION
Status: DISCONTINUED | OUTPATIENT
Start: 2022-11-17 | End: 2022-11-17 | Stop reason: HOSPADM

## 2022-11-17 RX ORDER — PROTAMINE SULFATE 10 MG/ML
INJECTION, SOLUTION INTRAVENOUS AS NEEDED
Status: DISCONTINUED | OUTPATIENT
Start: 2022-11-17 | End: 2022-11-17

## 2022-11-17 RX ORDER — FENTANYL CITRATE 50 UG/ML
INJECTION, SOLUTION INTRAMUSCULAR; INTRAVENOUS AS NEEDED
Status: DISCONTINUED | OUTPATIENT
Start: 2022-11-17 | End: 2022-11-17

## 2022-11-17 RX ORDER — SODIUM CHLORIDE 9 MG/ML
INJECTION, SOLUTION INTRAVENOUS CONTINUOUS PRN
Status: DISCONTINUED | OUTPATIENT
Start: 2022-11-17 | End: 2022-11-17

## 2022-11-17 RX ORDER — PROMETHAZINE HYDROCHLORIDE 25 MG/ML
25 INJECTION, SOLUTION INTRAMUSCULAR; INTRAVENOUS ONCE AS NEEDED
Status: DISCONTINUED | OUTPATIENT
Start: 2022-11-17 | End: 2022-11-17 | Stop reason: HOSPADM

## 2022-11-17 RX ORDER — ATORVASTATIN CALCIUM 40 MG/1
40 TABLET, FILM COATED ORAL
Status: DISCONTINUED | OUTPATIENT
Start: 2022-11-17 | End: 2022-11-19 | Stop reason: HOSPADM

## 2022-11-17 RX ORDER — METOPROLOL SUCCINATE 50 MG/1
50 TABLET, EXTENDED RELEASE ORAL 2 TIMES DAILY
Status: DISCONTINUED | OUTPATIENT
Start: 2022-11-17 | End: 2022-11-19 | Stop reason: HOSPADM

## 2022-11-17 RX ORDER — FUROSEMIDE 10 MG/ML
INJECTION INTRAMUSCULAR; INTRAVENOUS AS NEEDED
Status: DISCONTINUED | OUTPATIENT
Start: 2022-11-17 | End: 2022-11-17

## 2022-11-17 RX ORDER — FUROSEMIDE 40 MG/1
40 TABLET ORAL DAILY
Status: DISCONTINUED | OUTPATIENT
Start: 2022-11-17 | End: 2022-11-19 | Stop reason: HOSPADM

## 2022-11-17 RX ORDER — CLOPIDOGREL BISULFATE 75 MG/1
75 TABLET ORAL DAILY
Status: DISCONTINUED | OUTPATIENT
Start: 2022-11-17 | End: 2022-11-19 | Stop reason: HOSPADM

## 2022-11-17 RX ORDER — ONDANSETRON 2 MG/ML
INJECTION INTRAMUSCULAR; INTRAVENOUS AS NEEDED
Status: DISCONTINUED | OUTPATIENT
Start: 2022-11-17 | End: 2022-11-17

## 2022-11-17 RX ORDER — PANTOPRAZOLE SODIUM 40 MG/1
40 TABLET, DELAYED RELEASE ORAL
Status: DISCONTINUED | OUTPATIENT
Start: 2022-11-18 | End: 2022-11-19 | Stop reason: HOSPADM

## 2022-11-17 RX ORDER — DIGOXIN 125 MCG
125 TABLET ORAL DAILY
Status: DISCONTINUED | OUTPATIENT
Start: 2022-11-17 | End: 2022-11-18

## 2022-11-17 RX ORDER — ADENOSINE 3 MG/ML
INJECTION INTRAVENOUS CODE/TRAUMA/SEDATION MEDICATION
Status: DISCONTINUED | OUTPATIENT
Start: 2022-11-17 | End: 2022-11-17 | Stop reason: HOSPADM

## 2022-11-17 RX ORDER — LISINOPRIL 5 MG/1
5 TABLET ORAL DAILY
Status: DISCONTINUED | OUTPATIENT
Start: 2022-11-17 | End: 2022-11-19 | Stop reason: HOSPADM

## 2022-11-17 RX ORDER — HEPARIN SODIUM 1000 [USP'U]/ML
INJECTION, SOLUTION INTRAVENOUS; SUBCUTANEOUS CODE/TRAUMA/SEDATION MEDICATION
Status: DISCONTINUED | OUTPATIENT
Start: 2022-11-17 | End: 2022-11-17 | Stop reason: HOSPADM

## 2022-11-17 RX ORDER — FENTANYL CITRATE/PF 50 MCG/ML
25 SYRINGE (ML) INJECTION
Status: DISCONTINUED | OUTPATIENT
Start: 2022-11-17 | End: 2022-11-17 | Stop reason: HOSPADM

## 2022-11-17 RX ORDER — PROPOFOL 10 MG/ML
INJECTION, EMULSION INTRAVENOUS AS NEEDED
Status: DISCONTINUED | OUTPATIENT
Start: 2022-11-17 | End: 2022-11-17

## 2022-11-17 RX ORDER — HEPARIN SODIUM 10000 [USP'U]/100ML
INJECTION, SOLUTION INTRAVENOUS
Status: COMPLETED | OUTPATIENT
Start: 2022-11-17 | End: 2022-11-17

## 2022-11-17 RX ORDER — SUCCINYLCHOLINE/SOD CL,ISO/PF 100 MG/5ML
SYRINGE (ML) INTRAVENOUS AS NEEDED
Status: DISCONTINUED | OUTPATIENT
Start: 2022-11-17 | End: 2022-11-17

## 2022-11-17 RX ORDER — PANTOPRAZOLE SODIUM 40 MG/10ML
40 INJECTION, POWDER, LYOPHILIZED, FOR SOLUTION INTRAVENOUS ONCE
Status: COMPLETED | OUTPATIENT
Start: 2022-11-17 | End: 2022-11-17

## 2022-11-17 RX ORDER — ASPIRIN 81 MG/1
81 TABLET ORAL DAILY
Status: DISCONTINUED | OUTPATIENT
Start: 2022-11-17 | End: 2022-11-19 | Stop reason: HOSPADM

## 2022-11-17 RX ORDER — MIDAZOLAM HYDROCHLORIDE 2 MG/2ML
INJECTION, SOLUTION INTRAMUSCULAR; INTRAVENOUS AS NEEDED
Status: DISCONTINUED | OUTPATIENT
Start: 2022-11-17 | End: 2022-11-17

## 2022-11-17 RX ORDER — DEXAMETHASONE SODIUM PHOSPHATE 10 MG/ML
INJECTION, SOLUTION INTRAMUSCULAR; INTRAVENOUS AS NEEDED
Status: DISCONTINUED | OUTPATIENT
Start: 2022-11-17 | End: 2022-11-17

## 2022-11-17 RX ORDER — ACETAMINOPHEN 325 MG/1
650 TABLET ORAL EVERY 4 HOURS PRN
Status: DISCONTINUED | OUTPATIENT
Start: 2022-11-17 | End: 2022-11-19 | Stop reason: HOSPADM

## 2022-11-17 RX ORDER — LIDOCAINE HYDROCHLORIDE 10 MG/ML
INJECTION, SOLUTION EPIDURAL; INFILTRATION; INTRACAUDAL; PERINEURAL AS NEEDED
Status: DISCONTINUED | OUTPATIENT
Start: 2022-11-17 | End: 2022-11-17

## 2022-11-17 RX ORDER — ROCURONIUM BROMIDE 10 MG/ML
INJECTION, SOLUTION INTRAVENOUS AS NEEDED
Status: DISCONTINUED | OUTPATIENT
Start: 2022-11-17 | End: 2022-11-17

## 2022-11-17 RX ADMIN — PANTOPRAZOLE SODIUM 40 MG: 40 INJECTION, POWDER, FOR SOLUTION INTRAVENOUS at 08:25

## 2022-11-17 RX ADMIN — ACETAMINOPHEN 650 MG: 325 TABLET, FILM COATED ORAL at 21:56

## 2022-11-17 RX ADMIN — SODIUM CHLORIDE: 9 INJECTION, SOLUTION INTRAVENOUS at 08:09

## 2022-11-17 RX ADMIN — ROCURONIUM BROMIDE 20 MG: 10 SOLUTION INTRAVENOUS at 08:19

## 2022-11-17 RX ADMIN — MIDAZOLAM 2 MG: 1 INJECTION INTRAMUSCULAR; INTRAVENOUS at 07:58

## 2022-11-17 RX ADMIN — FUROSEMIDE 20 MG: 10 INJECTION, SOLUTION INTRAMUSCULAR; INTRAVENOUS at 11:44

## 2022-11-17 RX ADMIN — PROTAMINE SULFATE 10 MG: 10 INJECTION, SOLUTION INTRAVENOUS at 12:12

## 2022-11-17 RX ADMIN — ASPIRIN 81 MG: 81 TABLET, COATED ORAL at 15:00

## 2022-11-17 RX ADMIN — PROPOFOL 150 MG: 10 INJECTION, EMULSION INTRAVENOUS at 08:05

## 2022-11-17 RX ADMIN — PROTAMINE SULFATE 10 MG: 10 INJECTION, SOLUTION INTRAVENOUS at 12:11

## 2022-11-17 RX ADMIN — APIXABAN 5 MG: 5 TABLET, FILM COATED ORAL at 17:11

## 2022-11-17 RX ADMIN — PROTAMINE SULFATE 10 MG: 10 INJECTION, SOLUTION INTRAVENOUS at 12:13

## 2022-11-17 RX ADMIN — METOPROLOL SUCCINATE 50 MG: 50 TABLET, EXTENDED RELEASE ORAL at 17:10

## 2022-11-17 RX ADMIN — PROTAMINE SULFATE 10 MG: 10 INJECTION, SOLUTION INTRAVENOUS at 12:14

## 2022-11-17 RX ADMIN — PHENYLEPHRINE HYDROCHLORIDE 20 MCG/MIN: 10 INJECTION INTRAVENOUS at 08:34

## 2022-11-17 RX ADMIN — PROPOFOL 100 MG: 10 INJECTION, EMULSION INTRAVENOUS at 09:17

## 2022-11-17 RX ADMIN — FUROSEMIDE 40 MG: 40 TABLET ORAL at 14:58

## 2022-11-17 RX ADMIN — ATORVASTATIN CALCIUM 40 MG: 40 TABLET, FILM COATED ORAL at 17:10

## 2022-11-17 RX ADMIN — ONDANSETRON 4 MG: 2 INJECTION INTRAMUSCULAR; INTRAVENOUS at 12:11

## 2022-11-17 RX ADMIN — CEFAZOLIN 2000 MG: 1 INJECTION, POWDER, FOR SOLUTION INTRAMUSCULAR; INTRAVENOUS at 08:38

## 2022-11-17 RX ADMIN — PROTAMINE SULFATE 10 MG: 10 INJECTION, SOLUTION INTRAVENOUS at 12:16

## 2022-11-17 RX ADMIN — FENTANYL CITRATE 100 MCG: 50 INJECTION INTRAMUSCULAR; INTRAVENOUS at 08:04

## 2022-11-17 RX ADMIN — CLOPIDOGREL BISULFATE 75 MG: 75 TABLET, FILM COATED ORAL at 14:59

## 2022-11-17 RX ADMIN — SODIUM CHLORIDE: 0.9 INJECTION, SOLUTION INTRAVENOUS at 07:58

## 2022-11-17 RX ADMIN — DIGOXIN 125 MCG: 125 TABLET ORAL at 14:59

## 2022-11-17 RX ADMIN — PROTAMINE SULFATE 10 MG: 10 INJECTION, SOLUTION INTRAVENOUS at 12:10

## 2022-11-17 RX ADMIN — LIDOCAINE HYDROCHLORIDE 50 MG: 10 INJECTION, SOLUTION EPIDURAL; INFILTRATION; INTRACAUDAL; PERINEURAL at 08:04

## 2022-11-17 RX ADMIN — PROPOFOL 50 MG: 10 INJECTION, EMULSION INTRAVENOUS at 08:15

## 2022-11-17 RX ADMIN — SUGAMMADEX 200 MG: 100 INJECTION, SOLUTION INTRAVENOUS at 12:21

## 2022-11-17 RX ADMIN — LISINOPRIL 5 MG: 5 TABLET ORAL at 15:00

## 2022-11-17 RX ADMIN — PROTAMINE SULFATE 10 MG: 10 INJECTION, SOLUTION INTRAVENOUS at 12:15

## 2022-11-17 RX ADMIN — Medication 200 MG: at 08:06

## 2022-11-17 RX ADMIN — DEXAMETHASONE SODIUM PHOSPHATE 10 MG: 10 INJECTION, SOLUTION INTRAMUSCULAR; INTRAVENOUS at 08:30

## 2022-11-17 RX ADMIN — ROCURONIUM BROMIDE 30 MG: 10 SOLUTION INTRAVENOUS at 09:22

## 2022-11-17 NOTE — LETTER
[address] [facility]  [address] [facility] [address]  Dept: [phone]    November 18, 2022     Patient: Briana Pisano   YOB: 1970   Date of Visit: 11/18/2022       To Whom it May Concern:    Briana Pisano is under my professional care  He was seen in the hospital from 11/17/2022 to 11/18/22  He may return to work on 11/21/2022 with the following limitations Lifting no more than 5 lb for 1 week  Can return to full duty 11/28/2022  If you have any questions or concerns, please don't hesitate to call [phone]           Sincerely,          Avel Barrett PA-C

## 2022-11-17 NOTE — H&P
H&P Exam - Cardiology   Efraín Akhtar 46 y o  male MRN: [id number]  Unit/Bed#:  CATH LAB ROOM Encounter: [id number]    Assessment/Plan     Assessment:  1  Symptomatic persistent atrial fibrillation with periods of rapid ventricular response   A ) initially diagnosed 06/2022   B ) failed cardioversion attempt at time of initial diagnosis, status post successful cardioversion shortly thereafter following amiodarone load - however quickly reverted back to atrial fibrillation   C ) amiodarone discontinued 07/2022 due to rash   D ) currently rate controlled with metoprolol and digoxin, maintained on Eliquis anticoagulation  2  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% per MIKO 06/2022   A ) CAD and rapid AFib but likely contributing factors   B ) maintained on lisinopril and Toprol-XL  3  CAD status post PENNY to proximal LAD and mid RCA 06/2022   A ) at time of initial cardiomyopathy diagnosis underwent 2 cardiac catheterizations - the 1st one showed left main disease with no interventions performed, repeat catheterization 1 week later with findings and interventions as noted above (left main disease not noted)   B ) maintained on aspirin, Plavix, statin, and beta-blocker  4  Chronic HFrEF  5  Hyperlipidemia  6  Obesity with BMI 35      Plan:  MIKO/atrial fibrillation ablation  The initial plan was to also start antiarrhythmic therapy, however this has been denied by insurance  Will need to re-evaluate his clinical status following the procedure  History of Present Illness   HPI:  Efraín Akhtar is a 46y o  year old male with recently diagnosed persistent atrial fibrillation with periods of rapid ventricular response, likely mixed cardiomyopathy with most recent LVEF 40%, CAD with PENNY to LAD and RCA, chronic HFrEF, hyperlipidemia, and obesity  While he did not previously have a cardiac history, he presented to the hospital 06/2022 with palpitations and worsening shortness of breath    He was found to be in new onset atrial fibrillation with rapid ventricular response, and echocardiogram showed LVEF 35%  Notes suggest that he was placed on IV Cardizem and given an amiodarone bolus, afterwards becoming significantly hypotensive  He was treated in the ICU and required a short course of pressors  He was continued on amiodarone, and cardioversion was attempted however unsuccessful as he was not fully loaded  He underwent cardiac catheterization which showed calcified stenosis of the proximal LAD and mid RCA, that initial report noted left main disease as well  He was thus transferred to [facility] for more complex PCI and ongoing AFib management  He ultimately received PENNY to the mid RCA and proximal LAD  He was seen in consultation by EP, and was continued on amiodarone  After a more appropriate load, he was successfully cardioverted, however unfortunately he was noted to have a rash over his torso which was thought to be due to amiodarone and it was discontinued in favor of rate control with metoprolol and digoxin  When he was seen in the office shortly after discharge, he was already back in atrial fibrillation with controlled ventricular response  He was then seen in outpatient follow-up by Dr Anthony Leary, at which time atrial fibrillation ablation and antiarrhythmic initiation was recommended  Unfortunately, his insurance company denied the need for antiarrhythmics, but he still presents today to undergo AFib ablation  No significant changes since he was last seen  Review of Systems  ROS as noted above, otherwise 12 point review of systems was performed and is negative         Historical Information   Past Medical History:   Diagnosis Date   • Chest pain 9/25/2014   • Eczema    • Hidradenitis suppurativa 2/14/2018   • Rib pain 1/28/2015     Past Surgical History:   Procedure Laterality Date   • CARDIAC CATHETERIZATION Left 6/27/2022    Procedure: Cardiac catheterization; Surgeon: Jazmin Woodruff MD;  Location: [address] [place] [address] CATH LAB; Service: Cardiology   • CARDIAC CATHETERIZATION N/A 6/27/2022    Procedure: Cardiac Coronary Angiogram;  Surgeon: Jazmin Woodruff MD;  Location: [address] [place] [address] CATH LAB; Service: Cardiology   • CARDIAC CATHETERIZATION Left 6/27/2022    Procedure: Cardiac Left Heart Cath;  Surgeon: Jazmin Woodruff MD;  Location: [address] [place] [address] CATH LAB; Service: Cardiology   • CARDIAC CATHETERIZATION N/A 6/29/2022    Procedure: Cardiac pci;  Surgeon: Leilani Crump MD;  [facility]; Service: Cardiology   • CARDIAC CATHETERIZATION N/A 6/29/2022    Procedure: Cardiac Coronary Angiogram;  Surgeon: Leilani Crump MD;  [facility]; Service: Cardiology   • CARDIAC CATHETERIZATION  6/29/2022    Procedure: Cardiac catheterization;  Surgeon: Leilani Crump MD;  [facility]; Service: Cardiology   • CARDIAC CATHETERIZATION Left 6/29/2022    Procedure: Cardiac Left Heart Cath;  Surgeon: Leilani Crump MD;  [facility];   Service: Cardiology     Family History:   Family History   Problem Relation Age of Onset   • Mental illness Mother         denied   • Substance Abuse Mother         denied   • Mental illness Father         denied   • Substance Abuse Father         denied   • Heart disease Father         cardiac disorder       Social History   Social History     Substance and Sexual Activity   Alcohol Use Yes    Comment: social; moderate     Social History     Substance and Sexual Activity   Drug Use No     Social History     Tobacco Use   Smoking Status Former   • Packs/day: 1 00   • Years: 20 00   • Pack years: 20 00   • Types: Cigarettes   Smokeless Tobacco Former         Meds/Allergies   all medications and allergies reviewed  Home Medications:   Medications Prior to Admission   Medication   • apixaban (Eliquis) 5 mg   • aspirin (ECOTRIN LOW STRENGTH) 81 mg EC tablet   • atorvastatin (LIPITOR) 40 mg tablet • clopidogrel (PLAVIX) 75 mg tablet   • digoxin (LANOXIN) 0 125 mg tablet   • Eliquis 5 MG   • furosemide (LASIX) 40 mg tablet   • lisinopril (ZESTRIL) 5 mg tablet   • metoprolol succinate (TOPROL-XL) 50 mg 24 hr tablet       Allergies   Allergen Reactions   • Amiodarone Rash       Objective   Vitals: There were no vitals taken for this visit  No intake or output data in the 24 hours ending 11/17/22 0658    Invasive Devices     None                 Physical Exam  GEN: NAD, alert and oriented x 3, well appearing  SKIN: dry without significant lesions or rashes  HEENT: NCAT, PERRL, EOMs intact  NECK: No JVD appreciated  CARDIOVASCULAR:  Irregularly irregular, normal S1, S2 without murmurs, rubs, or gallops appreciated  LUNGS: Clear to auscultation bilaterally without wheezes, rhonchi, or rales  ABDOMEN: Soft, nontender, nondistended  EXTREMITIES/VASCULAR: perfused without clubbing, cyanosis, or LE edema b/l  PSYCH: Normal mood and affect  NEURO: CN ll-Xll grossly intact      Lab Results: I have personally reviewed pertinent lab results  Results from last 7 days   Lab Units 11/11/22  1123   WBC Thousand/uL 7 10   HEMOGLOBIN g/dL 13 2   HEMATOCRIT % 40 5   PLATELETS Thousands/uL 263     Results from last 7 days   Lab Units 11/11/22  1123   POTASSIUM mmol/L 4 4   CHLORIDE mmol/L 110*   CO2 mmol/L 23   BUN mg/dL 19   CREATININE mg/dL 1 34*   CALCIUM mg/dL 9 2         Results from last 7 days   Lab Units 11/11/22  1123   MAGNESIUM mg/dL 2 1         Imaging: I have personally reviewed pertinent reports  No results found for this or any previous visit        EKG: pending        Code Status: Level 1 - Full Code

## 2022-11-17 NOTE — DISCHARGE INSTRUCTIONS
PLEASE NOTE THE FOLLOWING MEDICATION RECOMMENDATIONS:  - take aspirin 81 mg daily for 30 days, at which time it can be discontinued  - take pantoprazole 40 mg daily for 30 days, at which time it can be discontinued  - please stop taking digoxin  - continue Eliquis, metoprolol, and all other medications as previously instructed  - please take colchicine 0 6 mg twice daily for 1 week  Please call our office at [phone] if you experience diarrhea to change medication  RESTRICTIONS:  No heavy lifting or strenuous activity for one week  No soaking in a bath tub/hot tub/swimming pool for one week or until groin heals  You may shower - please let soap and water run over the groins, no scrubbing, and pat the area dry  Please place band-aid on groins daily for up to five days, but you may remove sooner if no issues are noted  If you notice ongoing bleeding, swelling, or firm lumps in groin near ablation incision, please contact Dr Juanpablo Paul office - [phone]

## 2022-11-17 NOTE — ANESTHESIA PREPROCEDURE EVALUATION
Procedure:  Cardiac eps/afib ablation (Chest)    Relevant Problems   CARDIO   (+) Atrial fibrillation (HCC)   (+) CHF (congestive heart failure) (HCC)   (+) Coronary artery disease   (+) Hyperlipidemia   (+) Hypertension      /RENAL   (+) VASILE (acute kidney injury) (HCC)      NEURO/PSYCH   (+) Depression with anxiety      PULMONARY   (+) Smoker      Cardiovascular and Mediastinum   (+) Acute systolic heart failure (HCC)      Other   (+) Alcohol abuse   (+) Chronic cough   (+) Morbid obesity (HCC)        Physical Exam    Airway    Mallampati score: II  TM Distance: >3 FB  Neck ROM: full     Dental   No notable dental hx     Cardiovascular  Rhythm: irregular, Rate: abnormal,     Pulmonary  Breath sounds clear to auscultation, Decreased breath sounds, No wheezes,     Other Findings        Anesthesia Plan  ASA Score- 3     Anesthesia Type- general with ASA Monitors  Additional Monitors: arterial line  Airway Plan: ETT  Plan Factors-Exercise tolerance (METS): >4 METS  Chart reviewed  EKG reviewed  Imaging results reviewed  Existing labs reviewed  Patient summary reviewed  Patient is not a current smoker  Obstructive sleep apnea risk education given perioperatively  Induction- intravenous  Postoperative Plan- Plan for postoperative opioid use  Informed Consent- Anesthetic plan and risks discussed with patient  I personally reviewed this patient with the CRNA  Discussed and agreed on the Anesthesia Plan with the CRNA  Cecilia Sterling

## 2022-11-17 NOTE — ANESTHESIA PROCEDURE NOTES
Arterial Line Insertion  Performed by: Luis Michelle MD  Authorized by: Luis Michelle MD   Consent: Verbal consent obtained  Risks and benefits: risks, benefits and alternatives were discussed  Consent given by: patient  Required items: required blood products, implants, devices, and special equipment available  Patient identity confirmed: anonymous protocol, patient vented/unresponsive  Time out: Immediately prior to procedure a "time out" was called to verify the correct patient, procedure, equipment, support staff and site/side marked as required  Preparation: Patient was prepped and draped in the usual sterile fashion    Indications: multiple ABGs and hemodynamic monitoring  Orientation:  Right  Location: radial artery  Procedure Details:  Needle gauge: 20  Seldinger technique: Seldinger technique used  Number of attempts: 1    Post-procedure:  Post-procedure: dressing applied  Waveform: good waveform and waveform confirmed

## 2022-11-17 NOTE — ANESTHESIA POSTPROCEDURE EVALUATION
Post-Op Assessment Note    CV Status:  Stable    Pain management: adequate     Mental Status:  Alert and awake   Hydration Status:  Euvolemic   PONV Controlled:  Controlled   Airway Patency:  Patent      Post Op Vitals Reviewed: Yes      Staff: CRNA   Comments: VSS report RN        No notable events documented      BP   107/71   Temp      Pulse 74   Resp      SpO2   96 FM 6l

## 2022-11-17 NOTE — Clinical Note
Received [employer] forms through fax, will fax cover sheets to req. recs    Site (pad location): flank  Laterality: left  Grounding pad site assessment: skin integrity intact

## 2022-11-17 NOTE — DISCHARGE SUMMARY
Discharge Summary - Maribel Castle 46 y o  male MRN: [id number]    Unit/Bed#: [facility] 502-01 Encounter: [id number]      Admission Date: 11/17/2022   Discharge Date: 11/18/2022    Discharge Diagnosis:   1  Symptomatic persistent atrial fibrillation with periods of rapid ventricular response, status post RF ablation with pulmonary vein isolation, posterior wall isolation, and typical flutter line 11/17/2022              A ) initially diagnosed 06/2022              B ) failed cardioversion attempt at time of initial diagnosis, status post successful cardioversion shortly thereafter following amiodarone load - however quickly reverted back to atrial fibrillation              C ) amiodarone discontinued 07/2022 due to rash              D ) currently rate controlled with metoprolol and digoxin, maintained on Eliquis anticoagulation  2  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% per MIKO 06/2022              A ) CAD and rapid AFib but likely contributing factors              B ) maintained on lisinopril and Toprol-XL  3  CAD status post PENNY to proximal LAD and mid RCA 06/2022              A ) at time of initial cardiomyopathy diagnosis underwent 2 cardiac catheterizations - the 1st one showed left main disease with no interventions performed, repeat catheterization 1 week later with findings and interventions as noted above (left main disease not noted)              B ) maintained on aspirin, Plavix, statin, and beta-blocker  4  Chronic HFrEF  5  Hyperlipidemia  6  Obesity with BMI 35      Procedures Performed:   -Atrial fibrillation ablation  -Intracardiac echo  -Transseptal puncture  -3D mapping (Carto)  -Posterior wall isolation  -Empiric CTI line placement   Orders Placed This Encounter   Procedures   • Cardiac ep lab eps/ablations         Consultants: none      HPI: Please refer to the initial history and physical as well as procedure notes for full details   Briefly, Maribel Castle is a 46y o  year old male with recently diagnosed persistent atrial fibrillation with periods of rapid ventricular response, likely mixed cardiomyopathy with most recent LVEF 40%, CAD with PENNY to LAD and RCA, chronic HFrEF, hyperlipidemia, and obesity  While he did not previously have a cardiac history, he presented to the hospital 06/2022 with palpitations and worsening shortness of breath  He was found to be in new onset atrial fibrillation with rapid ventricular response, and echocardiogram showed LVEF 35%  Notes suggest that he was placed on IV Cardizem and given an amiodarone bolus, afterwards becoming significantly hypotensive  He was treated in the ICU and required a short course of pressors  He was continued on amiodarone, and cardioversion was attempted however unsuccessful as he was not fully loaded  He underwent cardiac catheterization which showed calcified stenosis of the proximal LAD and mid RCA, that initial report noted left main disease as well  He was thus transferred to [facility] for more complex PCI and ongoing AFib management  He ultimately received PENNY to the mid RCA and proximal LAD  He was seen in consultation by EP, and was continued on amiodarone  After a more appropriate load, he was successfully cardioverted, however unfortunately he was noted to have a rash over his torso which was thought to be due to amiodarone and it was discontinued in favor of rate control with metoprolol and digoxin  When he was seen in the office shortly after discharge, he was already back in atrial fibrillation with controlled ventricular response  He was then seen in outpatient follow-up by Dr Stephanie Moseley, at which time atrial fibrillation ablation and antiarrhythmic initiation was recommended  Unfortunately, his insurance company denied the need for antiarrhythmics, but he still presented this hospital admission to undergo this procedure         Hospital Course: Cammie Shoemaker presented at his baseline state of health  After the procedure was explained in detail and consent was obtained, he underwent the above procedures without complications  Please see operative notes by Dr Nandini Elmore for full details  He tolerated the procedure well  He was then monitored overnight for further observation  There were no acute issues or events overnight  The following morning he denied all cardiac complaints, including chest pain/pressure, dyspnea, palpitations, dizziness, lightheadedness, or syncope  His vital signs were reviewed and labs are stable  Telemetry showed sinus rhythm  His groins were soft without significant hematoma or recurrent bleeding, and groin sutures were removed without incident  Review of Systems   Constitutional: Negative for chills, diaphoresis, fever and malaise/fatigue  Cardiovascular: Negative for chest pain, claudication, cyanosis, dyspnea on exertion, irregular heartbeat, leg swelling, near-syncope, orthopnea, palpitations, paroxysmal nocturnal dyspnea and syncope  Respiratory: Negative for shortness of breath and sleep disturbances due to breathing  Neurological: Positive for light-headedness (Only 1 brief episode, resolved on its own)  Negative for dizziness  All other systems reviewed and are negative  Physical exam:  GEN: NAD, alert and oriented x 3, well appearing  SKIN: dry without significant lesions or rashes  HEENT: NCAT, PERRL, EOMs intact  NECK: No JVD appreciated  CARDIOVASCULAR: RRR, normal S1, S2 without murmurs, rubs, or gallops appreciated  LUNGS: Clear to auscultation bilaterally without wheezes, rhonchi, or rales  ABDOMEN: Soft, nontender, nondistended  EXTREMITIES/VASCULAR: perfused without clubbing, cyanosis, or LE edema b/l  PSYCH: Normal mood and affect  NEURO: CN ll-Xll grossly intact    He was given routine post ablation discharge instructions and restrictions, and these were explained in detail   He was given a follow up appointment with Dr Nandini Elmore, and he was instructed to follow up with his primary cardiologist as previously instructed  In terms of his medications, he will continue Eliquis and metoprolol  His digoxin was discontinued  He is maintained on Plavix given his recent PCI, but will also need to take aspirin 81 mg daily for 30 days in the post ablation setting, at which time it can be discontinued  We have also asked him to take pantoprazole 40 mg daily for 30 days, at which time it can also be discontinued  He is stable for discharge at this time with all questions answered  He was discussed in detail with Dr Valeria Paul who is in agreement with this discharge summary  Discharge Medications:  See after visit summary for reconciled discharge medications provided to patient and family      Medications Prior to Admission   Medication   • apixaban (Eliquis) 5 mg   • atorvastatin (LIPITOR) 40 mg tablet   • clopidogrel (PLAVIX) 75 mg tablet   • digoxin (LANOXIN) 0 125 mg tablet   • Eliquis 5 MG   • furosemide (LASIX) 40 mg tablet   • lisinopril (ZESTRIL) 5 mg tablet   • metoprolol succinate (TOPROL-XL) 50 mg 24 hr tablet   • aspirin (ECOTRIN LOW STRENGTH) 81 mg EC tablet         P[facility]inet Labs/diagnostics:  CBC with diff:   Results from last 7 days   Lab Units 11/17/22  0653 11/11/22  1123   WBC Thousand/uL 7 00 7 10   HEMOGLOBIN g/dL 12 9 13 2   HEMATOCRIT % 38 5 40 5   MCV fL 89 90   PLATELETS Thousands/uL 256 263   MCH pg 29 8 29 4   MCHC g/dL 33 5 32 6   RDW % 13 6 13 5   MPV fL 11 1 11 8   NRBC AUTO /100 WBCs 0 0       BMP:  Results from last 7 days   Lab Units 11/17/22  0653 11/11/22  1123   POTASSIUM mmol/L 4 3 4 4   CHLORIDE mmol/L 110* 110*   CO2 mmol/L 22 23   BUN mg/dL 22 19   CREATININE mg/dL 1 28 1 34*   CALCIUM mg/dL 9 1 9 2       Magnesium:   Results from last 7 days   Lab Units 11/11/22  1123   MAGNESIUM mg/dL 2 1       Coags:   Results from last 7 days   Lab Units 11/17/22  0653   INR  7 32         Complications: none    Condition at Discharge: good     Discharge instructions/Information to patient and family:   See after visit summary for information provided to patient and family  Provisions for Follow-Up Care:  See after visit summary for information related to follow-up care and any pertinent home health orders  Disposition: Home    Planned Readmission: No    Discharge Statement   I spent 45 minutes minutes discharging the patient  This time was spent on the day of discharge  I had direct contact with the patient on the day of discharge  Additional documentation is required if more than 30 minutes were spent on discharge   Evaluating the incision, discussing discharge instructions and restrictions, arranging follow up appointments, discussing medications

## 2022-11-18 ENCOUNTER — APPOINTMENT (OUTPATIENT)
Dept: NON INVASIVE DIAGNOSTICS | Facility: HOSPITAL | Age: 52
End: 2022-11-18

## 2022-11-18 LAB
ANION GAP SERPL CALCULATED.3IONS-SCNC: 4 MMOL/L (ref 4–13)
ANION GAP SERPL CALCULATED.3IONS-SCNC: 8 MMOL/L (ref 4–13)
ATRIAL RATE: 66 BPM
ATRIAL RATE: 71 BPM
ATRIAL RATE: 80 BPM
AV LVOT MEAN GRADIENT: 3 MMHG
AV LVOT PEAK GRADIENT: 5 MMHG
BUN SERPL-MCNC: 23 MG/DL (ref 5–25)
BUN SERPL-MCNC: 23 MG/DL (ref 5–25)
CALCIUM SERPL-MCNC: 8.4 MG/DL (ref 8.3–10.1)
CALCIUM SERPL-MCNC: 8.4 MG/DL (ref 8.3–10.1)
CHLORIDE SERPL-SCNC: 107 MMOL/L (ref 96–108)
CHLORIDE SERPL-SCNC: 107 MMOL/L (ref 96–108)
CO2 SERPL-SCNC: 23 MMOL/L (ref 21–32)
CO2 SERPL-SCNC: 25 MMOL/L (ref 21–32)
CREAT SERPL-MCNC: 1.31 MG/DL (ref 0.6–1.3)
CREAT SERPL-MCNC: 1.41 MG/DL (ref 0.6–1.3)
DOP CALC LVOT PEAK VEL VTI: 20.5 CM
DOP CALC LVOT PEAK VEL: 1.13 M/S
ERYTHROCYTE [DISTWIDTH] IN BLOOD BY AUTOMATED COUNT: 14.3 % (ref 11.6–15.1)
GFR SERPL CREATININE-BSD FRML MDRD: 56 ML/MIN/1.73SQ M
GFR SERPL CREATININE-BSD FRML MDRD: 62 ML/MIN/1.73SQ M
GLUCOSE SERPL-MCNC: 145 MG/DL (ref 65–140)
GLUCOSE SERPL-MCNC: 150 MG/DL (ref 65–140)
HCT VFR BLD AUTO: 34.5 % (ref 36.5–49.3)
HGB BLD-MCNC: 11.2 G/DL (ref 12–17)
MAGNESIUM SERPL-MCNC: 2.5 MG/DL (ref 1.6–2.6)
MCH RBC QN AUTO: 29.6 PG (ref 26.8–34.3)
MCHC RBC AUTO-ENTMCNC: 32.5 G/DL (ref 31.4–37.4)
MCV RBC AUTO: 91 FL (ref 82–98)
P AXIS: 65 DEGREES
P AXIS: 68 DEGREES
P AXIS: 73 DEGREES
PLATELET # BLD AUTO: 229 THOUSANDS/UL (ref 149–390)
PMV BLD AUTO: 11.2 FL (ref 8.9–12.7)
POTASSIUM SERPL-SCNC: 3.9 MMOL/L (ref 3.5–5.3)
POTASSIUM SERPL-SCNC: 4.1 MMOL/L (ref 3.5–5.3)
PR INTERVAL: 160 MS
PR INTERVAL: 171 MS
PR INTERVAL: 172 MS
QRS AXIS: 65 DEGREES
QRS AXIS: 70 DEGREES
QRS AXIS: 81 DEGREES
QRSD INTERVAL: 82 MS
QRSD INTERVAL: 88 MS
QRSD INTERVAL: 96 MS
QT INTERVAL: 354 MS
QT INTERVAL: 379 MS
QT INTERVAL: 386 MS
QTC INTERVAL: 404 MS
QTC INTERVAL: 408 MS
QTC INTERVAL: 412 MS
RBC # BLD AUTO: 3.78 MILLION/UL (ref 3.88–5.62)
SL CV LV EF: 40
SODIUM SERPL-SCNC: 136 MMOL/L (ref 135–147)
SODIUM SERPL-SCNC: 138 MMOL/L (ref 135–147)
T WAVE AXIS: -24 DEGREES
T WAVE AXIS: 78 DEGREES
T WAVE AXIS: 88 DEGREES
TR MAX PG: 30 MMHG
TR PEAK VELOCITY: 2.8 M/S
TRICUSPID VALVE PEAK REGURGITATION VELOCITY: 2.75 M/S
VENTRICULAR RATE: 66 BPM
VENTRICULAR RATE: 71 BPM
VENTRICULAR RATE: 80 BPM
WBC # BLD AUTO: 11.11 THOUSAND/UL (ref 4.31–10.16)

## 2022-11-18 RX ORDER — KETOROLAC TROMETHAMINE 30 MG/ML
15 INJECTION, SOLUTION INTRAMUSCULAR; INTRAVENOUS ONCE
Status: COMPLETED | OUTPATIENT
Start: 2022-11-18 | End: 2022-11-18

## 2022-11-18 RX ORDER — PANTOPRAZOLE SODIUM 40 MG/1
40 TABLET, DELAYED RELEASE ORAL
Qty: 30 TABLET | Refills: 0 | Status: SHIPPED | OUTPATIENT
Start: 2022-11-19

## 2022-11-18 RX ORDER — COLCHICINE 0.6 MG/1
0.6 TABLET ORAL 2 TIMES DAILY
Status: DISCONTINUED | OUTPATIENT
Start: 2022-11-19 | End: 2022-11-19 | Stop reason: HOSPADM

## 2022-11-18 RX ADMIN — FUROSEMIDE 40 MG: 40 TABLET ORAL at 08:10

## 2022-11-18 RX ADMIN — ASPIRIN 81 MG: 81 TABLET, COATED ORAL at 08:10

## 2022-11-18 RX ADMIN — ATORVASTATIN CALCIUM 40 MG: 40 TABLET, FILM COATED ORAL at 17:21

## 2022-11-18 RX ADMIN — METOPROLOL SUCCINATE 50 MG: 50 TABLET, EXTENDED RELEASE ORAL at 08:10

## 2022-11-18 RX ADMIN — APIXABAN 5 MG: 5 TABLET, FILM COATED ORAL at 17:21

## 2022-11-18 RX ADMIN — LISINOPRIL 5 MG: 5 TABLET ORAL at 08:10

## 2022-11-18 RX ADMIN — CLOPIDOGREL BISULFATE 75 MG: 75 TABLET, FILM COATED ORAL at 08:10

## 2022-11-18 RX ADMIN — METOPROLOL SUCCINATE 50 MG: 50 TABLET, EXTENDED RELEASE ORAL at 17:21

## 2022-11-18 RX ADMIN — PANTOPRAZOLE SODIUM 40 MG: 40 TABLET, DELAYED RELEASE ORAL at 05:25

## 2022-11-18 RX ADMIN — DIGOXIN 125 MCG: 125 TABLET ORAL at 08:10

## 2022-11-18 RX ADMIN — KETOROLAC TROMETHAMINE 15 MG: 30 INJECTION, SOLUTION INTRAMUSCULAR at 15:21

## 2022-11-18 RX ADMIN — APIXABAN 5 MG: 5 TABLET, FILM COATED ORAL at 08:10

## 2022-11-18 NOTE — PROGRESS NOTES
Progress Note - Electrophysiology  Camillia Alpers 46 y o  male MRN: [id number]  Unit/Bed#: [facility] 502-01 Encounter: [id number]      Assessment:  1  Symptomatic persistent atrial fibrillation with periods of rapid ventricular response, status post RF ablation with pulmonary vein isolation, posterior wall isolation, and typical flutter line 11/17/2022              A ) initially diagnosed 06/2022              B ) failed cardioversion attempt at time of initial diagnosis, status post successful cardioversion shortly thereafter following amiodarone load - however quickly reverted back to atrial fibrillation              C ) amiodarone discontinued 07/2022 due to rash              D ) currently rate controlled with metoprolol and digoxin, maintained on Eliquis anticoagulation  2  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% per MIKO 06/2022              A ) CAD and rapid AFib but likely contributing factors              B ) maintained on lisinopril and Toprol-XL  3  CAD status post PENNY to proximal LAD and mid RCA 06/2022              A ) at time of initial cardiomyopathy diagnosis underwent 2 cardiac catheterizations - the 1st one showed left main disease with no interventions performed, repeat catheterization 1 week later with findings and interventions as noted above (left main disease not noted)              B ) maintained on aspirin, Plavix, statin, and beta-blocker  4  Chronic HFrEF  5  Hyperlipidemia  6  Obesity with BMI 35      Plan:  Patient is status post atrial fibrillation and maintaining sinus rhythm  Groin sutures were removed without incidence  Patient is reporting symptoms of both acid reflux and pericarditis  Will trial 1 dose of IV Toradol and see if this helps  If that is the case, will start on colchicine  I did ask him to monitor his symptoms after dinner as this also was a trigger for him around lunchtime    Can attempt to increase Protonix to twice daily dosing as well if needed  Will cancel discharge order and keep him overnight to ensure no further issues  Will also order echo to rule out effusion  Subjective/Objective   Subjective:  Patient was seen today in the postop setting after undergoing ablation yesterday  Per notes he did have an episode of lightheadedness when changing position  However, when he was evaluated mid morning he reported that this had dissipated  He was set for discharge with incision sites being checked and sutures removed  Unfortunately, when patient did ambulate after he was written for discharge he did have chest discomfort  He also reports that this was after eating  It was worse with laying flat  He was concerned about going home  Objective:  Vitals: /80   Pulse 72   Temp 97 7 °F (36 5 °C) (Oral)   Resp 18   Ht 6' 2" (1 88 m)   Wt 124 kg (273 lb)   SpO2 98%   BMI 35 05 kg/m²     Vitals:    11/17/22 0825 11/18/22 1620   Weight: 124 kg (273 lb) 124 kg (273 lb)     Orthostatic Blood Pressures    Flowsheet Row Most Recent Value   Blood Pressure 123/80 filed at 11/18/2022 1620   Patient Position - Orthostatic VS Sitting filed at 11/18/2022 1508            Intake/Output Summary (Last 24 hours) at 11/18/2022 1728  Last data filed at 11/18/2022 0810  Gross per 24 hour   Intake 420 ml   Output 1300 ml   Net -880 ml       Invasive Devices     Peripheral Intravenous Line  Duration           Peripheral IV 11/17/22 Dorsal (posterior); Left Hand 1 day    Peripheral IV 11/17/22 Left Hand 1 day                          Scheduled Meds:  Current Facility-Administered Medications   Medication Dose Route Frequency Provider Last Rate   • acetaminophen  650 mg Oral Q4H PRN Yadiel Rodriguez PA-C     • apixaban  5 mg Oral BID Yadiel Rodriguez PA-C     • aspirin  81 mg Oral Daily Yadiel Rodriguez PA-C     • atorvastatin  40 mg Oral Daily With MELISSA Pa     • clopidogrel  75 mg Oral Daily Yadiel Rodriguez PA-C     • digoxin 125 mcg Oral Daily Karley Artis PA-C     • furosemide  40 mg Oral Daily Karley Artis, [place]     • lisinopril  5 mg Oral Daily Karley Artis, [place]     • metoprolol succinate  50 mg Oral BID Karley Artis PA-C     • pantoprazole  40 mg Oral Early Morning Karley Artis PA-C       Continuous Infusions:   PRN Meds: •  acetaminophen    Review of Systems:  ROS  ROS as noted above, otherwise 12 point review of systems was performed and is negative  Physical Exam:   Physical Exam  Vitals and nursing note reviewed  Constitutional:       General: He is not in acute distress  Appearance: He is well-developed and well-nourished  He is not diaphoretic  HENT:      Head: Normocephalic and atraumatic  Eyes:      Pupils: Pupils are equal, round, and reactive to light  Neck:      Vascular: No JVD  Cardiovascular:      Rate and Rhythm: Normal rate and regular rhythm  Heart sounds: No murmur heard  No friction rub  No gallop  Pulmonary:      Effort: Pulmonary effort is normal  No respiratory distress  Breath sounds: Normal breath sounds  No wheezing  Abdominal:      Palpations: Abdomen is soft  Musculoskeletal:         General: Normal range of motion  Cervical back: Normal range of motion  Skin:     General: Skin is warm and dry  Neurological:      Mental Status: He is alert and oriented to person, place, and time     Psychiatric:         Mood and Affect: Mood and affect normal                    Lab Results:   CBC with diff:   Results from last 7 days   Lab Units 11/18/22  0525   WBC Thousand/uL 11 11*   RBC Million/uL 3 78*   HEMOGLOBIN g/dL 11 2*   HEMATOCRIT % 34 5*   MCV fL 91   MCH pg 29 6   MCHC g/dL 32 5   RDW % 14 3   MPV fL 11 2   PLATELETS Thousands/uL 229     Results from last 7 days   Lab Units 11/18/22  0525 11/17/22  1036 11/17/22  0936 11/17/22  0653   WBC Thousand/uL 11 11*  --   --  7 00   HEMOGLOBIN g/dL 11 2*  --   --  12 9   I STAT HEMOGLOBIN g/dl  -- 11 2* 11 9*  --    HEMATOCRIT % 34 5*  --   --  38 5   HEMATOCRIT, ISTAT %  --  33* 35*  --    PLATELETS Thousands/uL 229  --   --  256     Results from last 7 days   Lab Units 11/18/22  0525 11/17/22  1036 11/17/22  0936 11/17/22  0936 11/17/22  0653   POTASSIUM mmol/L 4 1  --   --   --  4 3   CHLORIDE mmol/L 107  --   --   --  110*   CO2 mmol/L 23  --   --   --  22   CO2, I-STAT mmol/L  --  24  --  25  --    BUN mg/dL 23  --   --   --  22   CREATININE mg/dL 1 31*  --   --   --  1 28   GLUCOSE, ISTAT mg/dl  --  144*   < > 144*  --    CALCIUM mg/dL 8 4  --   --   --  9 1    < > = values in this interval not displayed  Results from last 7 days   Lab Units 11/17/22  0653   INR  0 99     Results from last 7 days   Lab Units 11/18/22  0525   MAGNESIUM mg/dL 2 5       Imaging: I have personally reviewed pertinent reports  No results found for this or any previous visit        VTE Pharmacologic Prophylaxis: Sequential compression device (Venodyne)   VTE Mechanical Prophylaxis: sequential compression device

## 2022-11-18 NOTE — PROGRESS NOTES
This RN contacted Avel Barrett with EP, as this RN was alerted that patient was complaining of 7/10 chest pain  Upon assessment, patient complained of midline chest pain that was worsened with ambulation and lying down  Of note, patient explained that pain was felt around into the back  EKG obtained  Provider to come to bedside to evaluate patient

## 2022-11-18 NOTE — NURSING NOTE
Pt stated he had an episode of lightheadedness last night  Blood pressure taken too far from incident  Mild lightheadedness getting back into bed this morning  Orthostatic vitals taken with no significant changes in blood pressure

## 2022-11-19 VITALS
RESPIRATION RATE: 16 BRPM | BODY MASS INDEX: 35.04 KG/M2 | WEIGHT: 273 LBS | TEMPERATURE: 97.9 F | DIASTOLIC BLOOD PRESSURE: 64 MMHG | HEIGHT: 74 IN | HEART RATE: 66 BPM | SYSTOLIC BLOOD PRESSURE: 124 MMHG | OXYGEN SATURATION: 93 %

## 2022-11-19 RX ORDER — COLCHICINE 0.6 MG/1
0.6 TABLET ORAL 2 TIMES DAILY
Qty: 14 TABLET | Refills: 0 | Status: SHIPPED | OUTPATIENT
Start: 2022-11-19

## 2022-11-19 RX ADMIN — COLCHICINE 0.6 MG: 0.6 TABLET ORAL at 08:13

## 2022-11-19 RX ADMIN — METOPROLOL SUCCINATE 50 MG: 50 TABLET, EXTENDED RELEASE ORAL at 08:14

## 2022-11-19 RX ADMIN — CLOPIDOGREL BISULFATE 75 MG: 75 TABLET, FILM COATED ORAL at 08:13

## 2022-11-19 RX ADMIN — LISINOPRIL 5 MG: 5 TABLET ORAL at 08:13

## 2022-11-19 RX ADMIN — APIXABAN 5 MG: 5 TABLET, FILM COATED ORAL at 08:13

## 2022-11-19 RX ADMIN — PANTOPRAZOLE SODIUM 40 MG: 40 TABLET, DELAYED RELEASE ORAL at 06:11

## 2022-11-19 RX ADMIN — FUROSEMIDE 40 MG: 40 TABLET ORAL at 08:13

## 2022-11-19 RX ADMIN — ASPIRIN 81 MG: 81 TABLET, COATED ORAL at 08:13

## 2022-11-19 NOTE — DISCHARGE SUMMARY
Discharge Summary - Efraín Akhtar 46 y o  male MRN: [id number]    Unit/Bed#: [facility] 502-01 Encounter: [id number]      Admission Date: 11/17/2022   Discharge Date: 11/19/2022    Discharge Diagnosis:   1  Symptomatic persistent atrial fibrillation with periods of rapid ventricular response, status post RF ablation with pulmonary vein isolation, posterior wall isolation, and typical flutter line 11/17/2022              A ) initially diagnosed 06/2022              B ) failed cardioversion attempt at time of initial diagnosis, status post successful cardioversion shortly thereafter following amiodarone load - however quickly reverted back to atrial fibrillation              C ) amiodarone discontinued 07/2022 due to rash              D ) currently rate controlled with metoprolol, maintained on Eliquis anticoagulation   E ) pericarditis post ablation being treated with colchicine  2  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% per MIKO 06/2022              A ) CAD and rapid AFib but likely contributing factors              B ) maintained on lisinopril and Toprol-XL  3  CAD status post PENNY to proximal LAD and mid RCA 06/2022              A ) at time of initial cardiomyopathy diagnosis underwent 2 cardiac catheterizations - the 1st one showed left main disease with no interventions performed, repeat catheterization 1 week later with findings and interventions as noted above (left main disease not noted)              B ) maintained on aspirin, Plavix, statin, and beta-blocker  4  Chronic HFrEF  5  Hyperlipidemia  6   Obesity with BMI 35    Procedures Performed:   -Atrial fibrillation ablation  -Intracardiac echo  -Transseptal puncture  -3D mapping (Carto)  -Posterior wall isolation  -Empiric CTI line placement   Orders Placed This Encounter   Procedures   • Cardiac ep lab eps/ablations         Consultants: none      HPI: Please refer to the initial history and physical as well as procedure notes for full details  Briefly, Conner Borges is a 46y o  year old male with recently diagnosed persistent atrial fibrillation with periods of rapid ventricular response, likely mixed cardiomyopathy with most recent LVEF 40%, CAD with PENNY to LAD and RCA, chronic HFrEF, hyperlipidemia, and obesity  While he did not previously have a cardiac history, he presented to the hospital 06/2022 with palpitations and worsening shortness of breath  He was found to be in new onset atrial fibrillation with rapid ventricular response, and echocardiogram showed LVEF 35%  Notes suggest that he was placed on IV Cardizem and given an amiodarone bolus, afterwards becoming significantly hypotensive  He was treated in the ICU and required a short course of pressors  He was continued on amiodarone, and cardioversion was attempted however unsuccessful as he was not fully loaded  He underwent cardiac catheterization which showed calcified stenosis of the proximal LAD and mid RCA, that initial report noted left main disease as well  He was thus transferred to [facility] for more complex PCI and ongoing AFib management  He ultimately received PENNY to the mid RCA and proximal LAD  He was seen in consultation by EP, and was continued on amiodarone  After a more appropriate load, he was successfully cardioverted, however unfortunately he was noted to have a rash over his torso which was thought to be due to amiodarone and it was discontinued in favor of rate control with metoprolol and digoxin  When he was seen in the office shortly after discharge, he was already back in atrial fibrillation with controlled ventricular response  He was then seen in outpatient follow-up by Dr Cary Duran, at which time atrial fibrillation ablation and antiarrhythmic initiation was recommended  Unfortunately, his insurance company denied the need for antiarrhythmics, but he still presented this hospital admission to undergo this procedure         Hospital Course: Dhiraj Carcamo presented at his baseline state of health  After the procedure was explained in detail and consent was obtained, he underwent the above procedures without complications  Please see operative notes by Dr Norma Larios for full details  He tolerated the procedure well  He was then monitored overnight for further observation  There were no acute issues or events overnight  The following morning he denied all cardiac complaints, including chest pain/pressure, dyspnea, palpitations, dizziness, lightheadedness, or syncope  His vital signs were reviewed and labs are stable  Telemetry showed sinus rhythm  His groins were soft without significant hematoma or recurrent bleeding, and groin sutures were removed without incident  After being written for discharge though he did complain of significant chest pain with ambulation that improved when sitting down  He also reported that the chest pain would increase in different positions, lying flat being the worst   Therefore, discharge was discontinued and he was given IV Toradol and ordered an echo to rule out effusion  Chest discomfort did improve with IV Toradol and effusion was ruled out  He was kept overnight and this morning reports that he feels better and did tolerate 1 dose of colchicine thus far  Review of Systems   Constitutional: Negative for chills, diaphoresis, fever and malaise/fatigue  Cardiovascular: Positive for chest pain (Pericarditis symptoms improving)  Negative for claudication, cyanosis, dyspnea on exertion, irregular heartbeat, leg swelling, near-syncope, orthopnea, palpitations, paroxysmal nocturnal dyspnea and syncope  Respiratory: Negative for shortness of breath and sleep disturbances due to breathing  Neurological: Positive for light-headedness (Only 1 brief episode, resolved on its own)  Negative for dizziness  All other systems reviewed and are negative      Physical exam:  GEN: NAD, alert and oriented x 3, well appearing  SKIN: dry without significant lesions or rashes  HEENT: NCAT, PERRL, EOMs intact  NECK: No JVD appreciated  CARDIOVASCULAR: RRR, normal S1, S2 without murmurs, rubs, or gallops appreciated  LUNGS: Clear to auscultation bilaterally without wheezes, rhonchi, or rales  ABDOMEN: Soft, nontender, nondistended  EXTREMITIES/VASCULAR: perfused without clubbing, cyanosis, or LE edema b/l  PSYCH: Normal mood and affect  NEURO: CN ll-Xll grossly intact    He was given routine post ablation discharge instructions and restrictions, and these were explained in detail  He was given a follow up appointment with Dr Keyonna Ruff, and he was instructed to follow up with his primary cardiologist as previously instructed  In terms of his medications, he will continue Eliquis and metoprolol  His digoxin was discontinued  He is maintained on Plavix given his recent PCI, but will also need to take aspirin 81 mg daily for 30 days in the post ablation setting, at which time it can be discontinued  We have also asked him to take pantoprazole 40 mg daily for 30 days, at which time it can also be discontinued  He will also be placed on colchicine 0 6 mg twice daily for 1 week to treat pericarditis  He was advised that if he has to experience diarrhea that he is to stop this medication but call our office to determine what other medication can be utilized instead  He is stable for discharge at this time with all questions answered  He was discussed in detail with Dr Keyonna Ruff who is in agreement with this discharge summary  Discharge Medications:  See after visit summary for reconciled discharge medications provided to patient and family      Medications Prior to Admission   Medication   • apixaban (Eliquis) 5 mg   • atorvastatin (LIPITOR) 40 mg tablet   • clopidogrel (PLAVIX) 75 mg tablet   • digoxin (LANOXIN) 0 125 mg tablet   • Eliquis 5 MG   • furosemide (LASIX) 40 mg tablet   • lisinopril (ZESTRIL) 5 mg tablet   • metoprolol succinate (TOPROL-XL) 50 mg 24 hr tablet   • aspirin (ECOTRIN LOW STRENGTH) 81 mg EC tablet         [facility] Labs/diagnostics:  CBC with diff:   Results from last 7 days   Lab Units 11/18/22  0525 11/17/22  1036 11/17/22  0936 11/17/22  0653   WBC Thousand/uL 11 11*  --   --  7 00   HEMOGLOBIN g/dL 11 2*  --   --  12 9   I STAT HEMOGLOBIN g/dl  --  11 2* 11 9*  --    HEMATOCRIT % 34 5*  --   --  38 5   HEMATOCRIT, ISTAT %  --  33* 35*  --    MCV fL 91  --   --  89   PLATELETS Thousands/uL 229  --   --  256   MCH pg 29 6  --   --  29 8   MCHC g/dL 32 5  --   --  33 5   RDW % 14 3  --   --  13 6   MPV fL 11 2  --   --  11 1   NRBC AUTO /100 WBCs  --   --   --  0       BMP:  Results from last 7 days   Lab Units 11/18/22  1813 11/18/22  0525 11/17/22  1036 11/17/22  0936 11/17/22  0936 11/17/22  0653   POTASSIUM mmol/L 3 9 4 1  --   --   --  4 3   CHLORIDE mmol/L 107 107  --   --   --  110*   CO2 mmol/L 25 23  --   --   --  22   CO2, I-STAT mmol/L  --   --  24  --  25  --    BUN mg/dL 23 23  --   --   --  22   CREATININE mg/dL 1 41* 1 31*  --   --   --  1 28   GLUCOSE, ISTAT mg/dl  --   --  144*   < > 144*  --    CALCIUM mg/dL 8 4 8 4  --   --   --  9 1    < > = values in this interval not displayed  Magnesium:   Results from last 7 days   Lab Units 11/18/22  0525   MAGNESIUM mg/dL 2 5       Coags:   Results from last 7 days   Lab Units 11/17/22  0653   INR  1 04         Complications: none    Condition at Discharge: good     Discharge instructions/Information to patient and family:   See after visit summary for information provided to patient and family  Provisions for Follow-Up Care:  See after visit summary for information related to follow-up care and any pertinent home health orders  Disposition: Home    Planned Readmission: No    Discharge Statement   I spent 45 minutes minutes discharging the patient  This time was spent on the day of discharge   I had direct contact with the patient on the day of discharge  Additional documentation is required if more than 30 minutes were spent on discharge   Evaluating the incision, discussing discharge instructions and restrictions, arranging follow up appointments, discussing medications

## 2022-11-19 NOTE — NURSING NOTE
Patient given discharge instructions and reviewed with the patient in the room  Patient understanding of the instructions provided to the patient  All belongings checked and accounted for  IVs removed  Patient left facility via ambulation

## 2022-12-03 DIAGNOSIS — I25.10 CORONARY ARTERY DISEASE: ICD-10-CM

## 2022-12-03 DIAGNOSIS — I48.91 ATRIAL FIBRILLATION, UNSPECIFIED TYPE (HCC): ICD-10-CM

## 2022-12-03 DIAGNOSIS — I31.9 PERICARDITIS: ICD-10-CM

## 2022-12-03 DIAGNOSIS — I48.19 PERSISTENT ATRIAL FIBRILLATION (HCC): ICD-10-CM

## 2022-12-04 DIAGNOSIS — I48.91 ATRIAL FIBRILLATION, UNSPECIFIED TYPE (HCC): ICD-10-CM

## 2022-12-04 DIAGNOSIS — I25.10 CORONARY ARTERY DISEASE: ICD-10-CM

## 2022-12-04 RX ORDER — CLOPIDOGREL BISULFATE 75 MG/1
TABLET ORAL
Qty: 30 TABLET | Refills: 0 | Status: SHIPPED | OUTPATIENT
Start: 2022-12-04

## 2022-12-04 RX ORDER — LISINOPRIL 5 MG/1
TABLET ORAL
Qty: 30 TABLET | Refills: 0 | Status: SHIPPED | OUTPATIENT
Start: 2022-12-04

## 2022-12-04 RX ORDER — ATORVASTATIN CALCIUM 40 MG/1
TABLET, FILM COATED ORAL
Qty: 30 TABLET | Refills: 0 | Status: SHIPPED | OUTPATIENT
Start: 2022-12-04

## 2022-12-04 RX ORDER — METOPROLOL SUCCINATE 50 MG/1
TABLET, EXTENDED RELEASE ORAL
Qty: 60 TABLET | Refills: 0 | Status: SHIPPED | OUTPATIENT
Start: 2022-12-04

## 2022-12-05 RX ORDER — COLCHICINE 0.6 MG/1
0.6 TABLET ORAL 2 TIMES DAILY
Qty: 14 TABLET | Refills: 0 | Status: SHIPPED | OUTPATIENT
Start: 2022-12-05

## 2022-12-05 RX ORDER — FUROSEMIDE 40 MG/1
TABLET ORAL
Qty: 38 TABLET | Refills: 0 | Status: SHIPPED | OUTPATIENT
Start: 2022-12-05

## 2022-12-05 RX ORDER — PANTOPRAZOLE SODIUM 40 MG/1
40 TABLET, DELAYED RELEASE ORAL
Qty: 30 TABLET | Refills: 0 | Status: SHIPPED | OUTPATIENT
Start: 2022-12-05

## 2022-12-08 RX ORDER — LISINOPRIL 5 MG/1
5 TABLET ORAL DAILY
Qty: 30 TABLET | Refills: 0 | Status: SHIPPED | OUTPATIENT
Start: 2022-12-08

## 2022-12-08 RX ORDER — CLOPIDOGREL BISULFATE 75 MG/1
75 TABLET ORAL DAILY
Qty: 30 TABLET | Refills: 0 | Status: SHIPPED | OUTPATIENT
Start: 2022-12-08

## 2022-12-08 RX ORDER — ATORVASTATIN CALCIUM 40 MG/1
40 TABLET, FILM COATED ORAL
Qty: 30 TABLET | Refills: 0 | Status: SHIPPED | OUTPATIENT
Start: 2022-12-08

## 2022-12-08 RX ORDER — METOPROLOL SUCCINATE 50 MG/1
50 TABLET, EXTENDED RELEASE ORAL 2 TIMES DAILY
Qty: 60 TABLET | Refills: 0 | Status: SHIPPED | OUTPATIENT
Start: 2022-12-08

## 2022-12-24 DIAGNOSIS — I31.9 PERICARDITIS: ICD-10-CM

## 2022-12-24 DIAGNOSIS — I48.19 PERSISTENT ATRIAL FIBRILLATION (HCC): ICD-10-CM

## 2022-12-27 ENCOUNTER — OFFICE VISIT (OUTPATIENT)
Dept: CARDIOLOGY CLINIC | Facility: CLINIC | Age: 52
End: 2022-12-27

## 2022-12-27 VITALS
BODY MASS INDEX: 35.05 KG/M2 | DIASTOLIC BLOOD PRESSURE: 80 MMHG | HEIGHT: 74 IN | SYSTOLIC BLOOD PRESSURE: 110 MMHG | HEART RATE: 67 BPM

## 2022-12-27 DIAGNOSIS — I25.10 CORONARY ARTERY DISEASE: ICD-10-CM

## 2022-12-27 DIAGNOSIS — I48.91 ATRIAL FIBRILLATION, UNSPECIFIED TYPE (HCC): Primary | ICD-10-CM

## 2022-12-27 DIAGNOSIS — Z12.11 ENCOUNTER FOR SCREENING COLONOSCOPY: ICD-10-CM

## 2022-12-27 RX ORDER — COLCHICINE 0.6 MG/1
TABLET ORAL
Qty: 14 TABLET | Refills: 0 | Status: SHIPPED | OUTPATIENT
Start: 2022-12-27 | End: 2022-12-27

## 2022-12-27 RX ORDER — PANTOPRAZOLE SODIUM 40 MG/1
TABLET, DELAYED RELEASE ORAL
Qty: 30 TABLET | Refills: 0 | Status: SHIPPED | OUTPATIENT
Start: 2022-12-27 | End: 2022-12-27

## 2022-12-27 RX ORDER — METOPROLOL SUCCINATE 25 MG/1
25 TABLET, EXTENDED RELEASE ORAL 2 TIMES DAILY
Qty: 30 TABLET | Refills: 3
Start: 2022-12-27

## 2022-12-27 NOTE — PROGRESS NOTES
Cardiology Follow Up    Chon Nicholson  1970  [id number]  [address]  [facility]  [address]  [phone]    1  Atrial fibrillation, unspecified type (HCC)  POCT ECG    metoprolol succinate (TOPROL-XL) 25 mg 24 hr tablet      2  Encounter for screening colonoscopy  Ambulatory referral for colon cancer education      3  Coronary artery disease  metoprolol succinate (TOPROL-XL) 25 mg 24 hr tablet          HPI:   Chon Nicholson is a 46 y o  man with new cardiomyopathy in setting of new atrial fibrillation, HTN, CAD who presents to discuss management of atrial fibrillation  He had dyspnea on exertion, palpitations for 4 days prior to the emergency room visit in June 2022  He was noted to be in atrial fibrillation with RVR and rates were elevated up to 170 beats per minute  Cardioversion was performed on June 23rd however it was unsuccessful despite administrating 200 joules, 300 joules and another 300 joules  He was therefore started on IV amiodarone loading  He had left heart catheterization which showed 90% left main disease, prox LAD 90% and RCA 75-80%  He was also noted to be in VASILE on CKD stage 2  He also had pneumonia that was treated with antibiotics  He went who have another cardioversion on June 30th after amiodarone loading which was successful with 300 joule  However he had rash on his torso and amiodarone was discontinued  He did receive oral steroid as well  He also received drug-eluting stent to prox LAD and mid RCA during the catheterization on 06/29/2022  During the 2nd catheterization left main disease was not noted  He was seen in general cardiology clinic on 7/7/2022 and was noted to be back in atrial fibrillation  He felt fatigue with exertion but otherwise denied dyspnea on exertion, chest pain, palpitations, swelling in lower extremity, orthopnea, PND or syncope    He did notice weight gain occurring over the past several days  He had not taken furosemide but did start taking it last night  He overall felt significantly better compared to his hospital visit in June  Interval history:   After discussing options, he underwent PVI, posterior wall isolation as well as ablation of CTI line on 11/17/22  He now presents for follow-up visit  He feels well  He denies any palpitations, chest pain, orthopnea, PND or syncope  He does notice fatigue and would prefer to come off medication if possible  Denies drinking alcohol and quit smoking several months ago      Patient Active Problem List   Diagnosis   • Hydradenitis   • Carpal tunnel syndrome   • Depression with anxiety   • Psoriasis   • Smoker   • Hypertension   • Alcohol abuse   • Chronic cough   • Atrial fibrillation ([facility])   • Hyperlipidemia   • CHF (congestive heart failure) ([facility])   • VASILE (acute kidney injury) ([facility])   • Acute systolic heart failure ([facility])   • Morbid obesity ([facility])   • Coronary artery disease   • Contact dermatitis   • Pre-diabetes   • Transaminitis     Past Medical History:   Diagnosis Date   • Chest pain 9/25/2014   • Eczema    • Hidradenitis suppurativa 2/14/2018   • Rib pain 1/28/2015     Social History     Socioeconomic History   • Marital status: Single     Spouse name: Not on file   • Number of children: Not on file   • Years of education: Not on file   • Highest education level: Not on file   Occupational History   • Occupation:     Tobacco Use   • Smoking status: Former     Packs/day: 1 00     Years: 20 00     Pack years: 20 00     Types: Cigarettes   • Smokeless tobacco: Former   Vaping Use   • Vaping Use: Never used   Substance and Sexual Activity   • Alcohol use: Yes     Comment: social; moderate   • Drug use: No   • Sexual activity: Yes     Partners: Female     Birth control/protection: Condom Male     Comment: single    Other Topics Concern   • Not on file   Social History Narrative   • Not on file     Social Determinants of Health     Financial Resource Strain: Not on file   Food Insecurity: No Food Insecurity   • Worried About Running Out of Food in the Last Year: Never true   • Ran Out of Food in the Last Year: Never true   Transportation Needs: No Transportation Needs   • Lack of Transportation (Medical): No   • Lack of Transportation (Non-Medical): No   Physical Activity: Not on file   Stress: Not on file   Social Connections: Not on file   Intimate Partner Violence: Not on file   Housing Stability: Low Risk    • Unable to Pay for Housing in the Last Year: No   • Number of Places Lived in the Last Year: 1   • Unstable Housing in the Last Year: No      Family History   Problem Relation Age of Onset   • Mental illness Mother         denied   • Substance Abuse Mother         denied   • Mental illness Father         denied   • Substance Abuse Father         denied   • Heart disease Father         cardiac disorder     Past Surgical History:   Procedure Laterality Date   • CARDIAC CATHETERIZATION Left 6/27/2022    Procedure: Cardiac catheterization;  Surgeon: Aaron Bae MD;  Location: [address] [place] [address] CATH LAB; Service: Cardiology   • CARDIAC CATHETERIZATION N/A 6/27/2022    Procedure: Cardiac Coronary Angiogram;  Surgeon: Aaron Bae MD;  Location: [address] [place] [address] CATH LAB; Service: Cardiology   • CARDIAC CATHETERIZATION Left 6/27/2022    Procedure: Cardiac Left Heart Cath;  Surgeon: Aaron Bae MD;  Location: [address] [place] [address] CATH LAB; Service: Cardiology   • CARDIAC CATHETERIZATION N/A 6/29/2022    Procedure: Cardiac pci;  Surgeon: Cody Marin MD;  Location:  CARDIAC CATH LAB; Service: Cardiology   • CARDIAC CATHETERIZATION N/A 6/29/2022    Procedure: Cardiac Coronary Angiogram;  Surgeon: Cody Marin MD;  [facility] C[facility];   Service: Cardiology   • CARDIAC CATHETERIZATION  6/29/2022    Procedure: Cardiac catheterization;  Surgeon: Cody Marin MD; [facility] C[facility]; Service: Cardiology   • CARDIAC CATHETERIZATION Left 6/29/2022    Procedure: Cardiac Left Heart Cath;  Surgeon: Arian Sanchez MD;  [facility] C[facility]; Service: Cardiology   • CARDIAC ELECTROPHYSIOLOGY PROCEDURE N/A 11/17/2022    Procedure: Cardiac eps/afib ablation;  Surgeon: Charo Montgomery MD;  [facility]: [facility] CATH [facility]; Service: Cardiology       Current Outpatient Medications:   •  apixaban (Eliquis) 5 mg, Take 1 tablet (5 mg total) by mouth 2 (two) times a day, Disp: 180 tablet, Rfl: 0  •  atorvastatin (LIPITOR) 40 mg tablet, Take 1 tablet (40 mg total) by mouth daily with dinner, Disp: 30 tablet, Rfl: 0  •  atorvastatin (LIPITOR) 40 mg tablet, TAKE ONE TABLET BY MOUTH EVERY DAY WITH DINNER , Disp: 30 tablet, Rfl: 0  •  clopidogrel (PLAVIX) 75 mg tablet, TAKE ONE TABLET BY MOUTH ONE TIME DAILY, Disp: 30 tablet, Rfl: 0  •  lisinopril (ZESTRIL) 5 mg tablet, Take 1 tablet (5 mg total) by mouth daily, Disp: 30 tablet, Rfl: 0  •  lisinopril (ZESTRIL) 5 mg tablet, TAKE ONE TABLET BY MOUTH ONE TIME DAILY, Disp: 30 tablet, Rfl: 0  •  metoprolol succinate (TOPROL-XL) 25 mg 24 hr tablet, Take 1 tablet (25 mg total) by mouth 2 (two) times a day, Disp: 30 tablet, Rfl: 3  •  aspirin (ECOTRIN LOW STRENGTH) 81 mg EC tablet, Take 1 tablet (81 mg total) by mouth daily for 20 days (Patient not taking: Reported on 12/27/2022), Disp: 20 tablet, Rfl: 0  Allergies   Allergen Reactions   • Amiodarone Rash         Review of Systems:  Review of Systems   Constitutional: Positive for fatigue  Negative for chills and fever  HENT: Negative  Eyes: Negative  Negative for discharge  Respiratory: Negative  Negative for chest tightness, shortness of breath and wheezing  Cardiovascular: Negative  Negative for chest pain, palpitations and leg swelling  Gastrointestinal: Negative  Negative for abdominal pain, nausea and vomiting  Endocrine: Negative  Genitourinary: Negative  Musculoskeletal: Negative  Negative for arthralgias  Skin: Negative  Negative for rash  Allergic/Immunologic: Negative  Neurological: Negative  Negative for dizziness and light-headedness  Hematological: Negative  Psychiatric/Behavioral: Negative  Objective:   /80 (BP Location: Right arm, Patient Position: Sitting, Cuff Size: Large)   Pulse 67   Ht 6' 2" (1 88 m)   BMI 35 05 kg/m²    Physical Exam:  GEN: NAD, Alert and oriented, well appearing  HEENT:Head, neck, ears, oral pharynx: Mucus membranes moist, oral pharynx clear, nares clear  External ears normal  EYES: Pupils equal, sclera anicteric  NECK: No JVD  CARDIOVASCULAR: Regular rate and rhythm, No murmur, rub, gallops S1,S2  LUNGS: Clear To auscultation bilaterally  ABDOMEN: Soft, nondistended  EXTREMITIES/VASCULAR: No edema  PSYCH: Normal Affect  NEURO: Grossly intact, moving all extremiteis equal, face symetric  HEME: No bleeding, bruising, petechia  SKIN: No significant rashes       Labs & Results:  Below is the patient's most recent value for Albumin, ALT, AST, BUN, Calcium, Chloride, Cholesterol, CO2, Creatinine, GFR, Glucose, HDL, Hematocrit, Hemoglobin, Hemoglobin A1C, LDL, Magnesium, Phosphorus, Platelets, Potassium, PSA, Sodium, Triglycerides, and WBC  Lab Results   Component Value Date    ALT 35 11/11/2022    AST 23 11/11/2022    BUN 23 11/18/2022    CALCIUM 8 4 11/18/2022     11/18/2022    CHOL 185 10/03/2014    CO2 25 11/18/2022    CREATININE 1 41 (H) 11/18/2022    HDL 34 (L) 06/23/2022    HCT 34 5 (L) 11/18/2022    HGB 11 2 (L) 11/18/2022    HGBA1C 5 9 (H) 06/22/2022    MG 2 5 11/18/2022    PHOS 3 8 07/01/2022     11/18/2022    K 3 9 11/18/2022    PSA 0 9 12/16/2021     10/03/2014    TRIG 77 06/23/2022    WBC 11 11 (H) 11/18/2022     Note: for a comprehensive list of the patient's lab results, access the Results Review activity            Cardiac testing:     I personally reviewed the ECG performed in the clinic on 12/27/22  It reveals sinus rhythm with rate of 67 bpm      Echocardiograms:  No results found for this or any previous visit  No results found for this or any previous visit  Catheterizations:   No results found for this or any previous visit  Stress Tests:  No results found for this or any previous visit  Holter monitor -   No results found for this or any previous visit  No results found for this or any previous visit  Discussion/Summary:  1  Atrial fibrillation   Patient is having persistent atrial fibrillation   He was given amiodarone in the hospital but had rash   He did feel better in sinus rhythm after cardioversion but at a follow-up visit he was back in atrial fibrillation   Given symptoms he would be better off with normal rhythm  Discussed rhythm control strategy with antiarrhythmic therapy (sotalol, Tikosyn) vs  Ablation procedure  He will call and let us know which option he would like to proceed with  Underwent PVI, posterior wall isolation and CTI line placement on 11/17/22  Remains in sinus rhythm  Due to fatigue will reduce metoprolol dose to 25 mg q12h   Continue with anticoagulation with Eliquis; d/c aspirin     2  CAD   Patient had drug-eluting stent to LAD and RCA placed in June   Maintained on Plavix, metoprolol and Lipitor  Denies angina     3  Systolic congestive heart failure  NYHA class 2 to symptoms   Patient had ejection fraction of 35% which improved to 40% on MIKO   Maintained on metoprolol, lisinopril   Feels weight gain due to fluid overload   Maintained on Lasix 40 mg daily   Denies any swelling; Take Lasix as needed    4  Hyperlipidemia   Maintained on Lipitor daily    5   HTN  Maintained on lisinopril 5 mg daily   Normotensive in the office     Follow-up in 6 months

## 2023-01-15 DIAGNOSIS — I25.10 CORONARY ARTERY DISEASE: ICD-10-CM

## 2023-01-16 RX ORDER — FUROSEMIDE 40 MG/1
TABLET ORAL
Qty: 38 TABLET | Refills: 0 | Status: SHIPPED | OUTPATIENT
Start: 2023-01-16

## 2023-02-03 ENCOUNTER — APPOINTMENT (EMERGENCY)
Dept: ULTRASOUND IMAGING | Facility: HOSPITAL | Age: 53
End: 2023-02-03

## 2023-02-03 ENCOUNTER — HOSPITAL ENCOUNTER (EMERGENCY)
Facility: HOSPITAL | Age: 53
Discharge: HOME | End: 2023-02-03
Attending: EMERGENCY MEDICINE

## 2023-02-03 VITALS
DIASTOLIC BLOOD PRESSURE: 73 MMHG | OXYGEN SATURATION: 96 % | WEIGHT: 273.37 LBS | HEART RATE: 78 BPM | TEMPERATURE: 97.9 F | RESPIRATION RATE: 17 BRPM | SYSTOLIC BLOOD PRESSURE: 148 MMHG | BODY MASS INDEX: 35.1 KG/M2

## 2023-02-03 DIAGNOSIS — N49.2 SCROTAL ABSCESS: Primary | ICD-10-CM

## 2023-02-03 LAB
ALBUMIN SERPL BCP-MCNC: 3.3 G/DL (ref 3.5–5)
ALP SERPL-CCNC: 115 U/L (ref 46–116)
ALT SERPL W P-5'-P-CCNC: 41 U/L (ref 12–78)
ANION GAP SERPL CALCULATED.3IONS-SCNC: 8 MMOL/L (ref 4–13)
AST SERPL W P-5'-P-CCNC: 34 U/L (ref 5–45)
BASOPHILS # BLD AUTO: 0.06 THOUSANDS/ÂΜL (ref 0–0.1)
BASOPHILS NFR BLD AUTO: 1 % (ref 0–1)
BILIRUB SERPL-MCNC: 0.33 MG/DL (ref 0.2–1)
BUN SERPL-MCNC: 22 MG/DL (ref 5–25)
CALCIUM ALBUM COR SERPL-MCNC: 9.5 MG/DL (ref 8.3–10.1)
CALCIUM SERPL-MCNC: 8.9 MG/DL (ref 8.3–10.1)
CHLORIDE SERPL-SCNC: 104 MMOL/L (ref 96–108)
CO2 SERPL-SCNC: 27 MMOL/L (ref 21–32)
CREAT SERPL-MCNC: 1.27 MG/DL (ref 0.6–1.3)
EOSINOPHIL # BLD AUTO: 0.14 THOUSAND/ÂΜL (ref 0–0.61)
EOSINOPHIL NFR BLD AUTO: 2 % (ref 0–6)
ERYTHROCYTE [DISTWIDTH] IN BLOOD BY AUTOMATED COUNT: 13.1 % (ref 11.6–15.1)
GFR SERPL CREATININE-BSD FRML MDRD: 64 ML/MIN/1.73SQ M
GLUCOSE SERPL-MCNC: 112 MG/DL (ref 65–140)
HCT VFR BLD AUTO: 38.2 % (ref 36.5–49.3)
HGB BLD-MCNC: 13 G/DL (ref 12–17)
IMM GRANULOCYTES # BLD AUTO: 0.05 THOUSAND/UL (ref 0–0.2)
IMM GRANULOCYTES NFR BLD AUTO: 1 % (ref 0–2)
LYMPHOCYTES # BLD AUTO: 1.61 THOUSANDS/ÂΜL (ref 0.6–4.47)
LYMPHOCYTES NFR BLD AUTO: 19 % (ref 14–44)
MCH RBC QN AUTO: 30.4 PG (ref 26.8–34.3)
MCHC RBC AUTO-ENTMCNC: 34 G/DL (ref 31.4–37.4)
MCV RBC AUTO: 90 FL (ref 82–98)
MONOCYTES # BLD AUTO: 0.71 THOUSAND/ÂΜL (ref 0.17–1.22)
MONOCYTES NFR BLD AUTO: 8 % (ref 4–12)
NEUTROPHILS # BLD AUTO: 5.98 THOUSANDS/ÂΜL (ref 1.85–7.62)
NEUTS SEG NFR BLD AUTO: 69 % (ref 43–75)
NRBC BLD AUTO-RTO: 0 /100 WBCS
PLATELET # BLD AUTO: 251 THOUSANDS/UL (ref 149–390)
PMV BLD AUTO: 10.9 FL (ref 8.9–12.7)
POTASSIUM SERPL-SCNC: 4.1 MMOL/L (ref 3.5–5.3)
PROT SERPL-MCNC: 7.7 G/DL (ref 6.4–8.4)
RBC # BLD AUTO: 4.27 MILLION/UL (ref 3.88–5.62)
SODIUM SERPL-SCNC: 139 MMOL/L (ref 135–147)
WBC # BLD AUTO: 8.55 THOUSAND/UL (ref 4.31–10.16)

## 2023-02-03 RX ORDER — SULFAMETHOXAZOLE AND TRIMETHOPRIM 800; 160 MG/1; MG/1
1 TABLET ORAL ONCE
Status: COMPLETED | OUTPATIENT
Start: 2023-02-03 | End: 2023-02-03

## 2023-02-03 RX ORDER — MORPHINE SULFATE 4 MG/ML
4 INJECTION, SOLUTION INTRAMUSCULAR; INTRAVENOUS ONCE
Status: COMPLETED | OUTPATIENT
Start: 2023-02-03 | End: 2023-02-03

## 2023-02-03 RX ORDER — CEPHALEXIN 500 MG/1
500 CAPSULE ORAL 4 TIMES DAILY
Qty: 28 CAPSULE | Refills: 0 | Status: SHIPPED | OUTPATIENT
Start: 2023-02-03 | End: 2023-02-10

## 2023-02-03 RX ORDER — LORAZEPAM 2 MG/ML
1 INJECTION INTRAMUSCULAR ONCE
Status: COMPLETED | OUTPATIENT
Start: 2023-02-03 | End: 2023-02-03

## 2023-02-03 RX ORDER — LIDOCAINE HYDROCHLORIDE 10 MG/ML
10 INJECTION, SOLUTION EPIDURAL; INFILTRATION; INTRACAUDAL; PERINEURAL ONCE
Status: COMPLETED | OUTPATIENT
Start: 2023-02-03 | End: 2023-02-03

## 2023-02-03 RX ORDER — CEPHALEXIN 250 MG/1
500 CAPSULE ORAL ONCE
Status: COMPLETED | OUTPATIENT
Start: 2023-02-03 | End: 2023-02-03

## 2023-02-03 RX ORDER — SULFAMETHOXAZOLE AND TRIMETHOPRIM 800; 160 MG/1; MG/1
1 TABLET ORAL 2 TIMES DAILY
Qty: 14 TABLET | Refills: 0 | Status: SHIPPED | OUTPATIENT
Start: 2023-02-03 | End: 2023-02-10

## 2023-02-03 RX ADMIN — LIDOCAINE HYDROCHLORIDE 10 ML: 10 INJECTION, SOLUTION EPIDURAL; INFILTRATION; INTRACAUDAL at 16:20

## 2023-02-03 RX ADMIN — SULFAMETHOXAZOLE AND TRIMETHOPRIM 1 TABLET: 800; 160 TABLET ORAL at 17:09

## 2023-02-03 RX ADMIN — CEPHALEXIN 500 MG: 250 CAPSULE ORAL at 17:09

## 2023-02-03 RX ADMIN — MORPHINE SULFATE 4 MG: 4 INJECTION INTRAVENOUS at 16:20

## 2023-02-03 RX ADMIN — LORAZEPAM 1 MG: 2 INJECTION INTRAMUSCULAR; INTRAVENOUS at 16:21

## 2023-02-03 NOTE — ED PROVIDER NOTES
History  Chief Complaint   Patient presents with   • Testicle Pain     C/o testicle pain and swelling for the past 2 days  Patient is a 59-year-old male with past medical history of heart failure, A  fib, hypertension, and CAD who presents with testicular swelling and pain  Patient states that his swelling and pain began approximately 2 days ago  Swelling is worse with standing upright  Patient states that there is a bloody discharge coming from the groin area where he had a previous surgery for hidradenitis  He denies trauma, penile discharge or dysuria, nausea, vomiting or abdominal pain  Prior to Admission Medications   Prescriptions Last Dose Informant Patient Reported? Taking? apixaban (Eliquis) 5 mg   No No   Sig: Take 1 tablet (5 mg total) by mouth 2 (two) times a day   aspirin (ECOTRIN LOW STRENGTH) 81 mg EC tablet   No No   Sig: Take 1 tablet (81 mg total) by mouth daily for 20 days   Patient not taking: Reported on 12/27/2022   atorvastatin (LIPITOR) 40 mg tablet   No No   Sig: Take 1 tablet (40 mg total) by mouth daily with dinner   atorvastatin (LIPITOR) 40 mg tablet   No No   Sig: TAKE ONE TABLET BY MOUTH EVERY DAY WITH DINNER     clopidogrel (PLAVIX) 75 mg tablet   No No   Sig: TAKE ONE TABLET BY MOUTH ONE TIME DAILY   furosemide (LASIX) 40 mg tablet   No No   Sig: TAKE ONE TABLET BY MOUTH ONE TIME DAILY   lisinopril (ZESTRIL) 5 mg tablet   No No   Sig: TAKE ONE TABLET BY MOUTH ONE TIME DAILY   metoprolol succinate (TOPROL-XL) 25 mg 24 hr tablet   No No   Sig: Take 1 tablet (25 mg total) by mouth 2 (two) times a day      Facility-Administered Medications: None       Past Medical History:   Diagnosis Date   • Chest pain 9/25/2014   • Eczema    • Hidradenitis suppurativa 2/14/2018   • Rib pain 1/28/2015       Past Surgical History:   Procedure Laterality Date   • CARDIAC CATHETERIZATION Left 6/27/2022    Procedure: Cardiac catheterization;  Surgeon: Rebecca Saunders MD;  Location: [facility]; Service: Cardiology   • CARDIAC CATHETERIZATION N/A 6/27/2022    Procedure: Cardiac Coronary Angiogram;  Surgeon: Christopher Tucker MD;  Location: [address] [place] [address] CATH LAB; Service: Cardiology   • CARDIAC CATHETERIZATION Left 6/27/2022    Procedure: Cardiac Left Heart Cath;  Surgeon: Christopher Tucker MD;  Location: [address] [place] [address] CATH LAB; Service: Cardiology   • CARDIAC CATHETERIZATION N/A 6/29/2022    Procedure: Cardiac pci;  Surgeon: Jan Brown MD;  Location: [facility]; Service: Cardiology   • CARDIAC CATHETERIZATION N/A 6/29/2022    Procedure: Cardiac Coronary Angiogram;  Surgeon: Jan Brown MD;  Location: [facility]; Service: Cardiology   • CARDIAC CATHETERIZATION  6/29/2022    Procedure: Cardiac catheterization;  Surgeon: Jan Brown MD;  [facility]; Service: Cardiology   • CARDIAC CATHETERIZATION Left 6/29/2022    Procedure: Cardiac Left Heart Cath;  Surgeon: Jan Brown MD;  [facility]; Service: Cardiology   • CARDIAC ELECTROPHYSIOLOGY PROCEDURE N/A 11/17/2022    Procedure: Cardiac eps/afib ablation;  Surgeon: Dmitriy Katz MD;  [facility]; Service: Cardiology       Family History   Problem Relation Age of Onset   • Mental illness Mother         denied   • Substance Abuse Mother         denied   • Mental illness Father         denied   • Substance Abuse Father         denied   • Heart disease Father         cardiac disorder     I have reviewed and agree with the history as documented      E-Cigarette/Vaping   • E-Cigarette Use Never User      E-Cigarette/Vaping Substances     Social History     Tobacco Use   • Smoking status: Former     Packs/day: 1 00     Years: 20 00     Pack years: 20 00     Types: Cigarettes   • Smokeless tobacco: Former   Vaping Use   • Vaping Use: Never used   Substance Use Topics   • Alcohol use: Yes     Comment: social; moderate   • Drug use: No       Review of Systems Constitutional: Negative for chills and fever  HENT: Negative for ear pain and sore throat  Respiratory: Negative for cough and shortness of breath  Cardiovascular: Negative for chest pain and palpitations  Gastrointestinal: Negative for abdominal pain, blood in stool, diarrhea, nausea and vomiting  Genitourinary: Positive for scrotal swelling and testicular pain  Negative for dysuria, hematuria, penile discharge, penile pain and penile swelling  Musculoskeletal: Negative for arthralgias and back pain  Skin: Negative for color change and rash  Neurological: Negative for seizures, syncope, light-headedness and headaches  All other systems reviewed and are negative  Physical Exam  Physical Exam  Exam conducted with a chaperone present  Genitourinary:     Testes:         Right: Tenderness and swelling present  Left: Tenderness and swelling present  Comments: Side of right testicle with bloody discharge  Fluctuant blanchable mass over right testicle  Hidradenitis          Vital Signs  ED Triage Vitals   Temperature Pulse Respirations Blood Pressure SpO2   02/03/23 1433 02/03/23 1433 02/03/23 1433 02/03/23 1433 02/03/23 1433   97 9 °F (36 6 °C) 83 18 (!) 172/80 99 %      Temp src Heart Rate Source Patient Position - Orthostatic VS BP Location FiO2 (%)   -- 02/03/23 1542 02/03/23 1542 02/03/23 1542 --    Monitor Lying Right arm       Pain Score       02/03/23 1620       5           Vitals:    02/03/23 1433 02/03/23 1542   BP: (!) 172/80 148/73   Pulse: 83 78   Patient Position - Orthostatic VS:  Lying         Visual Acuity      ED Medications  Medications   cephalexin (KEFLEX) capsule 500 mg (has no administration in time range)   sulfamethoxazole-trimethoprim (BACTRIM DS) 800-160 mg per tablet 1 tablet (has no administration in time range)   lidocaine (PF) (XYLOCAINE-MPF) 1 % injection 10 mL (10 mL Infiltration Given 2/3/23 1620)   morphine injection 4 mg (4 mg Intravenous Given 2/3/23 1620)   LORazepam (ATIVAN) injection 1 mg (1 mg Intravenous Given 2/3/23 1621)       Diagnostic Studies  Results Reviewed     Procedure Component Value Units Date/Time    Comprehensive metabolic panel [[id number]]  (Abnormal) Collected: 02/03/23 1540    Lab Status: Final result Specimen: Blood from Arm, Left Updated: 02/03/23 1612     Sodium 139 mmol/L      Potassium 4 1 mmol/L      Chloride 104 mmol/L      CO2 27 mmol/L      ANION GAP 8 mmol/L      BUN 22 mg/dL      Creatinine 1 27 mg/dL      Glucose 112 mg/dL      Calcium 8 9 mg/dL      Corrected Calcium 9 5 mg/dL      AST 34 U/L      ALT 41 U/L      Alkaline Phosphatase 115 U/L      Total Protein 7 7 g/dL      Albumin 3 3 g/dL      Total Bilirubin 0 33 mg/dL      eGFR 64 ml/min/1 73sq m     Narrative:      [place] guidelines for Chronic Kidney Disease (CKD):   •  Stage 1 with normal or high GFR (GFR > 90 mL/min/1 73 square meters)  •  Stage 2 Mild CKD (GFR = 60-89 mL/min/1 73 square meters)  •  Stage 3A Moderate CKD (GFR = 45-59 mL/min/1 73 square meters)  •  Stage 3B Moderate CKD (GFR = 30-44 mL/min/1 73 square meters)  •  Stage 4 Severe CKD (GFR = 15-29 mL/min/1 73 square meters)  •  Stage 5 End Stage CKD (GFR <15 mL/min/1 73 square meters)  Note: GFR calculation is accurate only with a steady state creatinine    Wound culture and Gram stain [[id number]]     Lab Status: No result Specimen: Wound from Groin     CBC and differential [[id number]] Collected: 02/03/23 1540    Lab Status: Final result Specimen: Blood from Arm, Left Updated: 02/03/23 1552     WBC 8 55 Thousand/uL      RBC 4 27 Million/uL      Hemoglobin 13 0 g/dL      Hematocrit 38 2 %      MCV 90 fL      MCH 30 4 pg      MCHC 34 0 g/dL      RDW 13 1 %      MPV 10 9 fL      Platelets 006 Thousands/uL      nRBC 0 /100 WBCs      Neutrophils Relative 69 %      Immat GRANS % 1 %      Lymphocytes Relative 19 %      Monocytes Relative 8 %      Eosinophils Relative 2 % Basophils Relative 1 %      Neutrophils Absolute 5 98 Thousands/µL      Immature Grans Absolute 0 05 Thousand/uL      Lymphocytes Absolute 1 61 Thousands/µL      Monocytes Absolute 0 71 Thousand/µL      Eosinophils Absolute 0 14 Thousand/µL      Basophils Absolute 0 06 Thousands/µL                  US scrotum and testicles   Final Result by Roxana Pierre MD (02/03 1552)       1  Right hemiscrotum scrotal wall abscess with marked diffuse scrotal wall thickening and edema  Recommend short-term follow-up scrotal ultrasound after treatment to assess for resolution  2   A 1 3 cm right epididymal cyst       The study was marked in EPIC for immediate notification  Workstation performed: [id number]                    Procedures  Incision and drain    Date/Time: 2/3/2023 5:02 PM  Performed by: April Grimm PA-C  Authorized by: April Grimm PA-C   Universal Protocol:  Consent: Verbal consent obtained  Risks and benefits: risks, benefits and alternatives were discussed  Consent given by: patient  Time out: Immediately prior to procedure a "time out" was called to verify the correct patient, procedure, equipment, support staff and site/side marked as required  Timeout called at: 2/3/2023 5:02 PM   Patient understanding: patient states understanding of the procedure being performed  Patient consent: the patient's understanding of the procedure matches consent given  Procedure consent: procedure consent matches procedure scheduled  Site marked: the operative site was marked  Patient identity confirmed: verbally with patient      Patient location:  ED  Location:     Type:  Abscess    Location:  Anogenital    Anogenital location:  Scrotal wall  Pre-procedure details:     Skin preparation:  Betadine  Sedation:     Sedation type: Anxiolysis  Anesthesia (see MAR for exact dosages):      Anesthesia method:  Local infiltration    Local anesthetic:  Lidocaine 1% w/o epi  Procedure details: Complexity:  Simple    Incision types:  Stab incision and single straight    Scalpel blade:  11    Approach:  Open    Incision depth:  Superficial    Drainage:  Bloody and serosanguinous    Drainage amount: Moderate    Wound treatment:  Wound left open    Packing materials:  None  Post-procedure details:     Patient tolerance of procedure: Tolerated well, no immediate complications             ED Course               Medical Decision Making  Patient 70-year-old male who presents with scrotal swelling and pain  Scrotum swollen on exam with fluctuant mass on the right side  Ultrasound with normal testicles, no torsion, scrotal wall edema and abscess  Incision and drainage performed with little pus, more serosanguineous drainage, approximately 10 mL  Was not able to obtain wound culture  Patient wound dressed, no packing  He was instructed to keep dressing over wound  Patient prescribed you start Keflex and Bactrim  Referral for urology given to patient with instructions to call and schedule appointment for follow-up  If worsening pain, drainage, rash or other new symptoms patient should return to ER  Scrotal abscess: acute illness or injury  Amount and/or Complexity of Data Reviewed  Labs: ordered  Radiology: ordered  Risk  Prescription drug management  Disposition  Final diagnoses:   Scrotal abscess     Time reflects when diagnosis was documented in both MDM as applicable and the Disposition within this note     Time User Action Codes Description Comment    2/3/2023  4:05 PM Mary Anne Castle Add [N49 2] Scrotal abscess       ED Disposition     ED Disposition   Discharge    Condition   Stable    Date/Time   Fri Feb 3, 2023  4:59 PM    Comment   Brent Huff discharge to home/self care                 Follow-up Information     Follow up With Specialties Details Why Contact Info Additional [address] [facility] Emergency Department Emergency Medicine Go to  If symptoms worsen [address] [facility] [place] [id number]  [address] [facility] Emergency Department, [address], [place], [address] [facility] [address], [place] Urology Schedule an appointment as soon as possible for a visit in 3 days  [address] [facility]  [phone]             Patient's Medications   Discharge Prescriptions    CEPHALEXIN (KEFLEX) 500 MG CAPSULE    Take 1 capsule (500 mg total) by mouth 4 (four) times a day for 7 days       Start Date: 2/3/2023  End Date: 2/10/2023       Order Dose: 500 mg       Quantity: 28 capsule    Refills: 0    SULFAMETHOXAZOLE-TRIMETHOPRIM (BACTRIM DS) 800-160 MG PER TABLET    Take 1 tablet by mouth 2 (two) times a day for 7 days smx-tmp DS (BACTRIM) 800-160 mg tabs (1tab q12 D10)       Start Date: 2/3/2023  End Date: 2/10/2023       Order Dose: 1 tablet       Quantity: 14 tablet    Refills: 0       No discharge procedures on file      PDMP Review     None          ED Provider  Electronically Signed by           Arben Anthony PA-C  02/03/23 2952

## 2023-02-03 NOTE — DISCHARGE INSTRUCTIONS
Schedule appointment with urology, Dr Shellie Rivera  Return to ER if fever, worsening discharge, pain or other new symptoms

## 2023-03-08 DIAGNOSIS — I25.10 CORONARY ARTERY DISEASE: ICD-10-CM

## 2023-03-09 RX ORDER — FUROSEMIDE 40 MG/1
TABLET ORAL
Qty: 38 TABLET | Refills: 0 | Status: SHIPPED | OUTPATIENT
Start: 2023-03-09

## 2023-04-03 DIAGNOSIS — I25.10 CORONARY ARTERY DISEASE: ICD-10-CM

## 2023-04-04 RX ORDER — FUROSEMIDE 40 MG/1
TABLET ORAL
Qty: 38 TABLET | Refills: 0 | Status: SHIPPED | OUTPATIENT
Start: 2023-04-04

## 2023-04-25 DIAGNOSIS — I25.10 CORONARY ARTERY DISEASE: ICD-10-CM

## 2023-04-27 RX ORDER — FUROSEMIDE 40 MG/1
TABLET ORAL
Qty: 38 TABLET | Refills: 0 | Status: SHIPPED | OUTPATIENT
Start: 2023-04-27

## 2023-06-09 DIAGNOSIS — I25.10 CORONARY ARTERY DISEASE: ICD-10-CM

## 2023-06-09 RX ORDER — ATORVASTATIN CALCIUM 40 MG/1
TABLET, FILM COATED ORAL
Qty: 30 TABLET | Refills: 0 | Status: SHIPPED | OUTPATIENT
Start: 2023-06-09

## 2023-07-11 DIAGNOSIS — I25.10 CORONARY ARTERY DISEASE: ICD-10-CM

## 2023-07-11 DIAGNOSIS — I48.91 ATRIAL FIBRILLATION, UNSPECIFIED TYPE (HCC): ICD-10-CM

## 2023-07-11 RX ORDER — FUROSEMIDE 40 MG/1
40 TABLET ORAL DAILY
Qty: 38 TABLET | Refills: 0 | Status: SHIPPED | OUTPATIENT
Start: 2023-07-11

## 2023-07-11 RX ORDER — METOPROLOL SUCCINATE 25 MG/1
25 TABLET, EXTENDED RELEASE ORAL 2 TIMES DAILY
Qty: 90 TABLET | Refills: 1 | Status: SHIPPED | OUTPATIENT
Start: 2023-07-11

## 2023-07-11 RX ORDER — CLOPIDOGREL BISULFATE 75 MG/1
75 TABLET ORAL DAILY
Qty: 90 TABLET | Refills: 1 | Status: SHIPPED | OUTPATIENT
Start: 2023-07-11

## 2023-07-11 RX ORDER — ATORVASTATIN CALCIUM 40 MG/1
40 TABLET, FILM COATED ORAL
Qty: 90 TABLET | Refills: 1 | Status: SHIPPED | OUTPATIENT
Start: 2023-07-11

## 2023-07-12 NOTE — TELEPHONE ENCOUNTER
7/12 Spoke to pt, he was on a job site and unable to look at his calendar, he will call the office back to schedule an annual physical.

## 2023-09-05 DIAGNOSIS — I48.91 ATRIAL FIBRILLATION, UNSPECIFIED TYPE (HCC): ICD-10-CM

## 2023-09-05 DIAGNOSIS — I25.10 CORONARY ARTERY DISEASE: ICD-10-CM

## 2023-09-05 RX ORDER — FUROSEMIDE 40 MG/1
40 TABLET ORAL DAILY
Qty: 38 TABLET | Refills: 0 | OUTPATIENT
Start: 2023-09-05

## 2023-09-06 RX ORDER — ATORVASTATIN CALCIUM 40 MG/1
40 TABLET, FILM COATED ORAL
Qty: 90 TABLET | Refills: 3 | Status: SHIPPED | OUTPATIENT
Start: 2023-09-06

## 2023-09-06 RX ORDER — METOPROLOL SUCCINATE 25 MG/1
25 TABLET, EXTENDED RELEASE ORAL 2 TIMES DAILY
Qty: 90 TABLET | Refills: 3 | Status: SHIPPED | OUTPATIENT
Start: 2023-09-06

## 2023-09-06 RX ORDER — LISINOPRIL 5 MG/1
5 TABLET ORAL DAILY
Qty: 30 TABLET | Refills: 11 | Status: SHIPPED | OUTPATIENT
Start: 2023-09-06

## 2023-09-06 RX ORDER — CLOPIDOGREL BISULFATE 75 MG/1
75 TABLET ORAL DAILY
Qty: 90 TABLET | Refills: 3 | Status: SHIPPED | OUTPATIENT
Start: 2023-09-06

## 2023-09-28 DIAGNOSIS — I25.10 CORONARY ARTERY DISEASE: ICD-10-CM

## 2023-09-28 RX ORDER — ATORVASTATIN CALCIUM 40 MG/1
40 TABLET, FILM COATED ORAL
Qty: 90 TABLET | Refills: 0 | Status: SHIPPED | OUTPATIENT
Start: 2023-09-28

## 2023-11-20 DIAGNOSIS — I48.91 ATRIAL FIBRILLATION, UNSPECIFIED TYPE (HCC): ICD-10-CM

## 2023-11-20 DIAGNOSIS — I25.10 CORONARY ARTERY DISEASE: ICD-10-CM

## 2023-11-20 RX ORDER — METOPROLOL SUCCINATE 25 MG/1
25 TABLET, EXTENDED RELEASE ORAL 2 TIMES DAILY
Qty: 90 TABLET | Refills: 0 | Status: SHIPPED | OUTPATIENT
Start: 2023-11-20

## 2023-11-21 RX ORDER — DIGOXIN 125 MCG
125 TABLET ORAL DAILY
Qty: 30 TABLET | Refills: 0 | Status: CANCELLED | OUTPATIENT
Start: 2023-11-21

## 2023-12-27 NOTE — TELEPHONE ENCOUNTER
Patient called our office looking for a refill on the Eliquis  When I looked into his chart, refills were sent over with you as the approving provider and that's why I sent you the refill  Patient was claiming that the pharmacy never received the Eliquis  X Size Of Lesion In Cm (Optional): 0 Scheduling Instructions (Optional): advised pt to return for nail clipping after nail polish is removed Detail Level: Detailed Introduction Text (Please End With A Colon): The following procedure was deferred:

## 2024-02-01 DIAGNOSIS — I48.91 ATRIAL FIBRILLATION, UNSPECIFIED TYPE (HCC): ICD-10-CM

## 2024-02-01 DIAGNOSIS — I25.10 CORONARY ARTERY DISEASE: ICD-10-CM

## 2024-02-01 RX ORDER — METOPROLOL SUCCINATE 25 MG/1
25 TABLET, EXTENDED RELEASE ORAL 2 TIMES DAILY
Qty: 90 TABLET | Refills: 0 | Status: SHIPPED | OUTPATIENT
Start: 2024-02-01

## 2024-02-02 ENCOUNTER — TELEPHONE (OUTPATIENT)
Dept: CARDIOLOGY CLINIC | Facility: CLINIC | Age: 54
End: 2024-02-02

## 2024-02-07 RX ORDER — CLOPIDOGREL BISULFATE 75 MG/1
75 TABLET ORAL DAILY
Qty: 90 TABLET | Refills: 0 | Status: SHIPPED | OUTPATIENT
Start: 2024-02-07

## 2024-02-07 RX ORDER — LISINOPRIL 5 MG/1
5 TABLET ORAL DAILY
Qty: 30 TABLET | Refills: 0 | Status: SHIPPED | OUTPATIENT
Start: 2024-02-07

## 2024-02-07 RX ORDER — ATORVASTATIN CALCIUM 40 MG/1
40 TABLET, FILM COATED ORAL
Qty: 90 TABLET | Refills: 0 | Status: SHIPPED | OUTPATIENT
Start: 2024-02-07

## 2024-05-04 DIAGNOSIS — I25.10 CORONARY ARTERY DISEASE: ICD-10-CM

## 2024-05-04 DIAGNOSIS — I48.91 ATRIAL FIBRILLATION, UNSPECIFIED TYPE (HCC): ICD-10-CM

## 2024-05-06 RX ORDER — METOPROLOL SUCCINATE 25 MG/1
25 TABLET, EXTENDED RELEASE ORAL 2 TIMES DAILY
Qty: 90 TABLET | Refills: 0 | Status: SHIPPED | OUTPATIENT
Start: 2024-05-06

## 2024-05-07 DIAGNOSIS — I25.10 CORONARY ARTERY DISEASE: ICD-10-CM

## 2024-05-07 NOTE — TELEPHONE ENCOUNTER
Refill must be reviewed and completed by the office or provider. The refill is unable to be approved or denied by the medication management team.    Last seen 12.27.2022 no appointment scheduled - Please review to see if the refill is appropriate.

## 2024-05-14 RX ORDER — LISINOPRIL 5 MG/1
5 TABLET ORAL DAILY
Qty: 30 TABLET | Refills: 0 | Status: SHIPPED | OUTPATIENT
Start: 2024-05-14

## 2024-05-14 NOTE — TELEPHONE ENCOUNTER
Patient called to request a refill for their Lisinopril  advised a refill was requested on 05/07/24 and is pending approval. Patient verbalized understanding and is in agreement.     Pt is all out of meds - pharmacy gave him tablets to hold over but almost out.

## 2024-05-17 DIAGNOSIS — I25.10 CORONARY ARTERY DISEASE: ICD-10-CM

## 2024-05-17 RX ORDER — LISINOPRIL 5 MG/1
5 TABLET ORAL DAILY
Qty: 30 TABLET | Refills: 0 | Status: CANCELLED | OUTPATIENT
Start: 2024-05-17

## 2024-05-17 RX ORDER — LISINOPRIL 5 MG/1
5 TABLET ORAL DAILY
Qty: 30 TABLET | Refills: 0 | OUTPATIENT
Start: 2024-05-17

## 2024-06-23 DIAGNOSIS — I48.91 ATRIAL FIBRILLATION, UNSPECIFIED TYPE (HCC): ICD-10-CM

## 2024-06-23 DIAGNOSIS — I25.10 CORONARY ARTERY DISEASE: ICD-10-CM

## 2024-06-24 DIAGNOSIS — I25.10 CORONARY ARTERY DISEASE: ICD-10-CM

## 2024-06-24 NOTE — TELEPHONE ENCOUNTER
Medication: Lisinopril 5mg tablet    Dose/Frequency: 1 tablet daily    Quantity: 30 tablets    Pharmacy: [facility]    Office:   [x] PCP/Provider - Rom Ba MD  [] Speciality/Provider -     Does the patient have enough for 3 days?   [x] Yes   [] No - Send as HP to POD

## 2024-06-25 RX ORDER — METOPROLOL SUCCINATE 25 MG/1
25 TABLET, EXTENDED RELEASE ORAL 2 TIMES DAILY
Qty: 90 TABLET | Refills: 0 | Status: SHIPPED | OUTPATIENT
Start: 2024-06-25

## 2024-06-25 RX ORDER — ATORVASTATIN CALCIUM 40 MG/1
40 TABLET, FILM COATED ORAL
Qty: 90 TABLET | Refills: 0 | Status: SHIPPED | OUTPATIENT
Start: 2024-06-25

## 2024-06-25 RX ORDER — CLOPIDOGREL BISULFATE 75 MG/1
75 TABLET ORAL DAILY
Qty: 90 TABLET | Refills: 0 | Status: SHIPPED | OUTPATIENT
Start: 2024-06-25

## 2024-06-25 RX ORDER — LISINOPRIL 5 MG/1
5 TABLET ORAL DAILY
Qty: 30 TABLET | Refills: 0 | Status: SHIPPED | OUTPATIENT
Start: 2024-06-25

## 2024-08-01 ENCOUNTER — OFFICE VISIT (OUTPATIENT)
Dept: CARDIOLOGY CLINIC | Facility: CLINIC | Age: 54
End: 2024-08-01
Payer: COMMERCIAL

## 2024-08-01 VITALS
WEIGHT: 297.4 LBS | BODY MASS INDEX: 38.17 KG/M2 | HEIGHT: 74 IN | DIASTOLIC BLOOD PRESSURE: 78 MMHG | SYSTOLIC BLOOD PRESSURE: 124 MMHG | HEART RATE: 68 BPM

## 2024-08-01 DIAGNOSIS — I48.91 ATRIAL FIBRILLATION, UNSPECIFIED TYPE (HCC): Primary | ICD-10-CM

## 2024-08-01 DIAGNOSIS — I48.19 PERSISTENT ATRIAL FIBRILLATION (HCC): ICD-10-CM

## 2024-08-01 DIAGNOSIS — I25.10 CORONARY ARTERY DISEASE INVOLVING NATIVE CORONARY ARTERY OF NATIVE HEART WITHOUT ANGINA PECTORIS: ICD-10-CM

## 2024-08-01 PROCEDURE — 99215 OFFICE O/P EST HI 40 MIN: CPT | Performed by: INTERNAL MEDICINE

## 2024-08-01 NOTE — PROGRESS NOTES
Cardiology Follow Up    Eugene Amaro  1970  [id number]  [facility] [place]  [address]  [address]  [place] [id number] [phone]    1. Atrial fibrillation, unspecified type (HCC)  Echo complete w/ contrast if indicated      2. Persistent atrial fibrillation (HCC)  Comprehensive metabolic panel    CBC and differential      3. Coronary artery disease involving native coronary artery of native heart without angina pectoris  Comprehensive metabolic panel    CBC and differential    Echo complete w/ contrast if indicated            HPI:   Eugene Amaro is a 53 y.o. man with new cardiomyopathy in setting of new atrial fibrillation, HTN, CAD who presents to discuss management of atrial fibrillation.      He had dyspnea on exertion, palpitations for 4 days prior to the emergency room visit in June 2022.  He was noted to be in atrial fibrillation with RVR and rates were elevated up to 170 beats per minute.  Cardioversion was performed on June 23rd however it was unsuccessful despite administrating 200 joules, 300 joules and another 300 joules.  He was therefore started on IV amiodarone loading.  He had left heart catheterization which showed 90% left main disease, prox LAD 90% and RCA 75-80%.  He was also noted to be in VASILE on CKD stage 2.  He also had pneumonia that was treated with antibiotics.    He went who have another cardioversion on June 30th after amiodarone loading which was successful with 300 joule.  However he had rash on his torso and amiodarone was discontinued.  He did receive oral steroid as well.  He also received drug-eluting stent to prox LAD and mid RCA during the catheterization on 06/29/2022. During the 2nd catheterization left main disease was not noted.    He was seen in general cardiology clinic on 7/7/2022 and was noted to be back in atrial fibrillation. He felt fatigue with exertion but otherwise denied dyspnea  on exertion, chest pain, palpitations, swelling in lower extremity, orthopnea, PND or syncope.  He did notice weight gain occurring over the past several days.  He had not taken furosemide but did start taking it last night.    He overall felt significantly better compared to his hospital visit in June.    Office visit 12/27/2022:  After discussing options, he underwent PVI, posterior wall isolation as well as ablation of CTI line on 11/17/22. He now presents for follow-up visit.     He felt well. He denied any palpitations, chest pain, orthopnea, PND or syncope. He did notice fatigue and preferred to come off medication if possible.     Interval history:  Eugene presents for a follow-up visit. He is doing well. He is taking Lasix as needed. He otherwise reports compliance with medications but does miss dosages.     Denies drinking alcohol and quit smoking several months ago.    Patient Active Problem List   Diagnosis    Hydradenitis    Carpal tunnel syndrome    Depression with anxiety    Psoriasis    Smoker    Hypertension    Alcohol abuse    Chronic cough    Atrial fibrillation (HCC)    Hyperlipidemia    CHF (congestive heart failure) (HCC)    VASILE (acute kidney injury) (HCC)    Acute systolic heart failure (HCC)    Morbid obesity (HCC)    Coronary artery disease    Contact dermatitis    Pre-diabetes    Transaminitis     Past Medical History:   Diagnosis Date    Chest pain 9/25/2014    Eczema     Hidradenitis suppurativa 2/14/2018    Rib pain 1/28/2015     Social History     Socioeconomic History    Marital status: Single     Spouse name: Not on file    Number of children: Not on file    Years of education: Not on file    Highest education level: Not on file   Occupational History    Occupation:     Tobacco Use    Smoking status: Former     Current packs/day: 1.00     Average packs/day: 1 pack/day for 20.0 years (20.0 ttl pk-yrs)     Types: Cigarettes    Smokeless tobacco: Former   Vaping Use     Vaping status: Never Used   Substance and Sexual Activity    Alcohol use: Yes     Comment: social; moderate    Drug use: No    Sexual activity: Yes     Partners: Female     Birth control/protection: Condom Male     Comment: single    Other Topics Concern    Not on file   Social History Narrative    Not on file     Social Determinants of Health     Financial Resource Strain: Not on file   Food Insecurity: No Food Insecurity (6/29/2022)    Hunger Vital Sign     Worried About Running Out of Food in the Last Year: Never true     Ran Out of Food in the Last Year: Never true   Transportation Needs: No Transportation Needs (6/29/2022)    PRAPARE - Transportation     Lack of Transportation (Medical): No     Lack of Transportation (Non-Medical): No   Physical Activity: Not on file   Stress: Not on file   Social Connections: Not on file   Intimate Partner Violence: Not on file   Housing Stability: Low Risk  (6/29/2022)    Housing Stability Vital Sign     Unable to Pay for Housing in the Last Year: No     Number of Places Lived in the Last Year: 1     Unstable Housing in the Last Year: No      Family History   Problem Relation Age of Onset    Mental illness Mother         denied    Substance Abuse Mother         denied    Mental illness Father         denied    Substance Abuse Father         denied    Heart disease Father         cardiac disorder     Past Surgical History:   Procedure Laterality Date    CARDIAC CATHETERIZATION Left 6/27/2022    Procedure: Cardiac catheterization;  Surgeon: Flor Lucia MD;  Location: [facility];  Service: Cardiology    CARDIAC CATHETERIZATION N/A 6/27/2022    Procedure: Cardiac Coronary Angiogram;  Surgeon: Flor Lucia MD;  Location: [facility];  Service: Cardiology    CARDIAC CATHETERIZATION Left 6/27/2022    Procedure: Cardiac Left Heart Cath;  Surgeon: Flor Lucia MD;  Location: [facility];  Service: Cardiology    CARDIAC CATHETERIZATION N/A  6/29/2022    Procedure: Cardiac pci;  Surgeon: Holger Arambula MD;  [facility]: [facility];  Service: Cardiology    CARDIAC CATHETERIZATION N/A 6/29/2022    Procedure: Cardiac Coronary Angiogram;  Surgeon: Holger Arambula MD;  [facility]: [facility];  Service: Cardiology    CARDIAC CATHETERIZATION  6/29/2022    Procedure: Cardiac catheterization;  Surgeon: Holger Arambula MD;  [facility]: [facility];  Service: Cardiology    CARDIAC CATHETERIZATION Left 6/29/2022    Procedure: Cardiac Left Heart Cath;  Surgeon: Holger Arambula MD;  [facility]: [facility];  Service: Cardiology    CARDIAC ELECTROPHYSIOLOGY PROCEDURE N/A 11/17/2022    Procedure: Cardiac eps/afib ablation;  Surgeon: Marcellus Shi MD;  [facility]: [facility];  Service: Cardiology       Current Outpatient Medications:     apixaban (Eliquis) 5 mg, Take 1 tablet (5 mg total) by mouth 2 (two) times a day, Disp: 180 tablet, Rfl: 0    atorvastatin (LIPITOR) 40 mg tablet, Take 1 tablet (40 mg total) by mouth daily with dinner, Disp: 90 tablet, Rfl: 0    clopidogrel (PLAVIX) 75 mg tablet, Take 1 tablet (75 mg total) by mouth daily, Disp: 90 tablet, Rfl: 0    furosemide (LASIX) 40 mg tablet, Take 1 tablet (40 mg total) by mouth daily, Disp: 38 tablet, Rfl: 0    lisinopril (ZESTRIL) 5 mg tablet, Take 1 tablet (5 mg total) by mouth daily, Disp: 30 tablet, Rfl: 0    metoprolol succinate (TOPROL-XL) 25 mg 24 hr tablet, Take 1 tablet (25 mg total) by mouth 2 (two) times a day, Disp: 90 tablet, Rfl: 0    aspirin (ECOTRIN LOW STRENGTH) 81 mg EC tablet, Take 1 tablet (81 mg total) by mouth daily for 20 days (Patient not taking: Reported on 12/27/2022), Disp: 20 tablet, Rfl: 0    atorvastatin (LIPITOR) 40 mg tablet, Take 1 tablet (40 mg total) by mouth daily with dinner (Patient not taking: Reported on 8/1/2024), Disp: 90 tablet, Rfl: 0  Allergies   Allergen Reactions    Amiodarone Rash         Review of Systems:  Review of Systems  "  Constitutional:  Negative for chills, fatigue and fever.   HENT: Negative.     Eyes: Negative.  Negative for discharge.   Respiratory: Negative.  Negative for chest tightness, shortness of breath and wheezing.    Cardiovascular: Negative.  Negative for chest pain, palpitations and leg swelling.   Gastrointestinal: Negative.  Negative for abdominal pain, nausea and vomiting.   Endocrine: Negative.    Genitourinary: Negative.    Musculoskeletal: Negative.  Negative for arthralgias.   Skin: Negative.  Negative for rash.   Allergic/Immunologic: Negative.    Neurological: Negative.  Negative for dizziness and light-headedness.   Hematological: Negative.    Psychiatric/Behavioral: Negative.       Objective:   /78 (BP Location: Right arm, Patient Position: Sitting, Cuff Size: Large)   Pulse 68   Ht 6' 2\" (1.88 m)   Wt 135 kg (297 lb 6.4 oz)   BMI 38.18 kg/m²    Physical Exam:  GEN: NAD, Alert and oriented, well appearing  HEENT:Head, neck, ears, oral pharynx: Mucus membranes moist, oral pharynx clear, nares clear. External ears normal  EYES: Pupils equal, sclera anicteric  NECK: No JVD  CARDIOVASCULAR: Regular rate and rhythm, No murmur, rub, gallops S1,S2  LUNGS: Clear To auscultation bilaterally  ABDOMEN: Soft, nondistended  EXTREMITIES/VASCULAR: No edema.  PSYCH: Normal Affect  NEURO: Grossly intact, moving all extremiteis equal, face symetric  HEME: No bleeding, bruising, petechia  SKIN: No significant rashes       Labs & Results:  Below is the patient's most recent value for Albumin, ALT, AST, BUN, Calcium, Chloride, Cholesterol, CO2, Creatinine, GFR, Glucose, HDL, Hematocrit, Hemoglobin, Hemoglobin A1C, LDL, Magnesium, Phosphorus, Platelets, Potassium, PSA, Sodium, Triglycerides, and WBC.   Lab Results   Component Value Date    ALT 41 02/03/2023    AST 34 02/03/2023    BUN 22 02/03/2023    CALCIUM 8.9 02/03/2023     02/03/2023    CHOL 185 10/03/2014    CO2 27 02/03/2023    CREATININE 1.27 02/03/2023 "    HDL 34 (L) 06/23/2022    HCT 38.2 02/03/2023    HGB 13.0 02/03/2023    HGBA1C 5.9 (H) 06/22/2022    MG 2.5 11/18/2022    PHOS 3.8 07/01/2022     02/03/2023    K 4.1 02/03/2023    PSA 0.9 12/16/2021     10/03/2014    TRIG 77 06/23/2022    WBC 8.55 02/03/2023     Note: for a comprehensive list of the patient's lab results, access the Results Review activity.          Cardiac testing:     I personally reviewed the ECG performed in the clinic on 08/01/24. It reveals normal sinus rhythm.     Echocardiograms:  No results found for this or any previous visit.    No results found for this or any previous visit.      Catheterizations:   No results found for this or any previous visit.      Stress Tests:  No results found for this or any previous visit.      Holter monitor -   No results found for this or any previous visit.    No results found for this or any previous visit.    Discussion/Summary:  1. Atrial fibrillation   Patient is having persistent atrial fibrillation   He was given amiodarone in the hospital but had rash   He did feel better in sinus rhythm after cardioversion but at a follow-up visit he was back in atrial fibrillation   Given symptoms he would be better off with normal rhythm  Discussed rhythm control strategy with antiarrhythmic therapy (sotalol, Tikosyn) vs. Ablation procedure  He will call and let us know which option he would like to proceed with  Underwent PVI, posterior wall isolation and CTI line placement on 11/17/22  Remains in sinus rhythm  Due to fatigue will reduce metoprolol dose to 25 mg q12h   Continue with anticoagulation with Eliquis; d/c'ed aspirin   Discussed loop monitor but he would like to think about it    2. CAD   Patient had drug-eluting stent to LAD and RCA placed in June   Maintained on Plavix, metoprolol and Lipitor  Denies angina     3. Systolic congestive heart failure  NYHA class 2 to symptoms   Patient had ejection fraction of 35% which improved to 40%-45%  on MIKO   Maintained on metoprolol, lisinopril   Feels weight gain due to fluid overload   Maintained on Lasix 40 mg daily but takes at needed  Denies any swelling;   Will obtain an echocardiogram    4. Hyperlipidemia   Maintained on Lipitor daily    5. HTN  Maintained on lisinopril 5 mg daily   Normotensive in the office     Follow-up in 1 year

## 2024-08-14 DIAGNOSIS — I25.10 CORONARY ARTERY DISEASE: ICD-10-CM

## 2024-08-14 DIAGNOSIS — I48.91 ATRIAL FIBRILLATION, UNSPECIFIED TYPE (HCC): ICD-10-CM

## 2024-08-15 RX ORDER — METOPROLOL SUCCINATE 25 MG/1
25 TABLET, EXTENDED RELEASE ORAL 2 TIMES DAILY
Qty: 60 TABLET | Refills: 5 | Status: SHIPPED | OUTPATIENT
Start: 2024-08-15

## 2024-08-15 RX ORDER — LISINOPRIL 5 MG/1
5 TABLET ORAL DAILY
Qty: 90 TABLET | Refills: 0 | Status: SHIPPED | OUTPATIENT
Start: 2024-08-15

## 2024-10-17 NOTE — PHYSICAL THERAPY NOTE
Physical Therapy Cancellation     Patient's Name: Claritza Barnett    Admitting Diagnosis  CHF (congestive heart failure) ([facility] ) [I50 9]  AF (atrial fibrillation) ([facility] ) [I48 91]  SOB (shortness of breath) [R06 02]    Problem List  Patient Active Problem List   Diagnosis    Hydradenitis    Carpal tunnel syndrome    Depression with anxiety    Psoriasis    Smoker    Hypertension    Alcohol abuse    Chronic cough    Atrial fibrillation (HCC)    Hyperlipidemia    CHF (congestive heart failure) (HCC)    VASILE (acute kidney injury) ([facility] )    PNA (pneumonia)    Acute systolic heart failure ([facility] )    Morbid obesity ([facility] )       Past Medical History  Past Medical History:   Diagnosis Date    Chest pain 9/25/2014    Eczema     Hidradenitis suppurativa 2/14/2018    Rib pain 1/28/2015       Past Surgical History  History reviewed  No pertinent surgical history  06/27/22 0802   PT Last Visit   PT Visit Date 06/27/22   Note Type   Note type Cancelled Session   Cancel Reasons Medical status   Additional Comments PT order received  Chart review performed  At this time, PT evaluation cancelled as pt pending cardiac cath this date, DCCV possibly tomorrow per most recent cardiology note  PT will follow and evaluate as appropriate           Ameena Sawyer, PT, DPT
No

## 2024-10-30 DIAGNOSIS — I25.10 CORONARY ARTERY DISEASE: ICD-10-CM

## 2024-10-30 RX ORDER — CLOPIDOGREL BISULFATE 75 MG/1
75 TABLET ORAL DAILY
Qty: 30 TABLET | Refills: 0 | Status: SHIPPED | OUTPATIENT
Start: 2024-10-30

## 2024-10-30 RX ORDER — ATORVASTATIN CALCIUM 40 MG/1
40 TABLET, FILM COATED ORAL
Qty: 30 TABLET | Refills: 0 | Status: SHIPPED | OUTPATIENT
Start: 2024-10-30

## 2024-12-09 DIAGNOSIS — I25.10 CORONARY ARTERY DISEASE: ICD-10-CM

## 2024-12-09 DIAGNOSIS — I48.91 ATRIAL FIBRILLATION, UNSPECIFIED TYPE (HCC): ICD-10-CM

## 2024-12-11 RX ORDER — LISINOPRIL 5 MG/1
5 TABLET ORAL DAILY
Qty: 30 TABLET | Refills: 0 | Status: SHIPPED | OUTPATIENT
Start: 2024-12-11

## 2024-12-11 RX ORDER — CLOPIDOGREL BISULFATE 75 MG/1
75 TABLET ORAL DAILY
Qty: 30 TABLET | Refills: 0 | Status: SHIPPED | OUTPATIENT
Start: 2024-12-11

## 2024-12-11 RX ORDER — ATORVASTATIN CALCIUM 40 MG/1
40 TABLET, FILM COATED ORAL
Qty: 30 TABLET | Refills: 0 | Status: SHIPPED | OUTPATIENT
Start: 2024-12-11

## 2025-02-06 DIAGNOSIS — I25.10 CORONARY ARTERY DISEASE: ICD-10-CM

## 2025-02-07 ENCOUNTER — TELEPHONE (OUTPATIENT)
Age: 55
End: 2025-02-07

## 2025-02-07 RX ORDER — CLOPIDOGREL BISULFATE 75 MG/1
75 TABLET ORAL DAILY
Qty: 30 TABLET | Refills: 0 | OUTPATIENT
Start: 2025-02-07

## 2025-02-07 NOTE — TELEPHONE ENCOUNTER
Received a call from [place] pharmacist pt is looking for refills on Lipitor, Plavix,atorvastatin  But Plavix refused.     Appears is still pending, pharmacist states pt needs the refills.   Valentina is off today    C/b [phone]

## 2025-02-10 ENCOUNTER — TELEPHONE (OUTPATIENT)
Dept: CARDIOLOGY CLINIC | Facility: CLINIC | Age: 55
End: 2025-02-10

## 2025-02-10 DIAGNOSIS — I25.10 CORONARY ARTERY DISEASE: ICD-10-CM

## 2025-02-10 DIAGNOSIS — I25.10 CORONARY ARTERY DISEASE INVOLVING NATIVE CORONARY ARTERY OF NATIVE HEART WITHOUT ANGINA PECTORIS: Primary | ICD-10-CM

## 2025-02-10 RX ORDER — ASPIRIN 81 MG/1
81 TABLET ORAL DAILY
Qty: 20 TABLET | Refills: 0 | Status: SHIPPED | OUTPATIENT
Start: 2025-02-10 | End: 2025-03-02

## 2025-02-10 RX ORDER — LISINOPRIL 5 MG/1
5 TABLET ORAL DAILY
Qty: 30 TABLET | Refills: 0 | Status: SHIPPED | OUTPATIENT
Start: 2025-02-10

## 2025-02-10 RX ORDER — ATORVASTATIN CALCIUM 40 MG/1
40 TABLET, FILM COATED ORAL
Qty: 90 TABLET | Refills: 3 | Status: SHIPPED | OUTPATIENT
Start: 2025-02-10

## 2025-02-10 RX ORDER — ATORVASTATIN CALCIUM 40 MG/1
40 TABLET, FILM COATED ORAL
Qty: 30 TABLET | Refills: 0 | Status: SHIPPED | OUTPATIENT
Start: 2025-02-10

## 2025-02-10 NOTE — TELEPHONE ENCOUNTER
----- Message from JEROD Herrera sent at 2/10/2025  8:00 AM EST -----  Regarding: medication phone call  Please call Eugene and inform him to stop Plavix, start ASA 81 mg daily with food.  Refill for Lipitor sent.  He needs to follow up with a general cardiologist.  Please have him call and schedule an appointment.  Thank you Valentina    Called pt and lft msg reL stop plavix, start 81mg asa qd. Script sent and call ov for f/u.

## 2025-03-07 NOTE — ASSESSMENT & PLAN NOTE
Rest and hydrate with plenty of fluids     Celestone shot given in clinic 9 mg     Augmentin antibiotic prescription for sinus infection and right-sided ear infection     Continue over-the-counter cough and cold medicine chlorpheniramine and dextromethorphan     Ibuprofen or Tylenol for sinus pain or sore throat     Return for any concerns or problems and follow up with PCP.   Wt Readings from Last 3 Encounters:   07/01/22 121 kg (266 lb 12 1 oz)   06/28/22 124 kg (272 lb 4 3 oz)   11/16/21 124 kg (274 lb)     · Likely brought on by afib  · MIKO 6/23: EF 35% with dilated LV and moderate global hypokinesis, moderate MR and mild TR  · Cath as noted above  · Appears to be euvolemic, continue lasix 40mg PO BID  · Monitor volume status with IVF  · On goal directed therapy with ASA, beta, statin, and ACEI

## 2025-03-12 DIAGNOSIS — I25.10 CORONARY ARTERY DISEASE: ICD-10-CM

## 2025-03-13 RX ORDER — LISINOPRIL 5 MG/1
5 TABLET ORAL DAILY
Qty: 30 TABLET | Refills: 0 | Status: SHIPPED | OUTPATIENT
Start: 2025-03-13

## 2025-04-06 DIAGNOSIS — I25.10 CORONARY ARTERY DISEASE: ICD-10-CM

## 2025-04-07 RX ORDER — LISINOPRIL 5 MG/1
5 TABLET ORAL DAILY
Qty: 30 TABLET | Refills: 3 | Status: SHIPPED | OUTPATIENT
Start: 2025-04-07

## 2025-06-11 ENCOUNTER — TELEPHONE (OUTPATIENT)
Dept: FAMILY MEDICINE CLINIC | Facility: CLINIC | Age: 55
End: 2025-06-11

## 2025-06-16 ENCOUNTER — OFFICE VISIT (OUTPATIENT)
Dept: FAMILY MEDICINE CLINIC | Facility: CLINIC | Age: 55
End: 2025-06-16
Payer: COMMERCIAL

## 2025-06-16 VITALS
DIASTOLIC BLOOD PRESSURE: 108 MMHG | BODY MASS INDEX: 38 KG/M2 | TEMPERATURE: 98.4 F | HEIGHT: 75 IN | OXYGEN SATURATION: 98 % | SYSTOLIC BLOOD PRESSURE: 174 MMHG | WEIGHT: 305.6 LBS | HEART RATE: 84 BPM

## 2025-06-16 DIAGNOSIS — Z12.5 SCREENING FOR PROSTATE CANCER: ICD-10-CM

## 2025-06-16 DIAGNOSIS — D68.2 HEREDITARY DEFICIENCY OF OTHER CLOTTING FACTORS (HCC): ICD-10-CM

## 2025-06-16 DIAGNOSIS — Z12.11 SCREENING FOR COLON CANCER: ICD-10-CM

## 2025-06-16 DIAGNOSIS — I50.21 ACUTE SYSTOLIC HEART FAILURE (HCC): ICD-10-CM

## 2025-06-16 DIAGNOSIS — E78.5 HYPERLIPIDEMIA, UNSPECIFIED HYPERLIPIDEMIA TYPE: ICD-10-CM

## 2025-06-16 DIAGNOSIS — Z00.00 ANNUAL PHYSICAL EXAM: ICD-10-CM

## 2025-06-16 DIAGNOSIS — G89.29 CHRONIC PAIN OF RIGHT KNEE: ICD-10-CM

## 2025-06-16 DIAGNOSIS — I48.19 PERSISTENT ATRIAL FIBRILLATION (HCC): ICD-10-CM

## 2025-06-16 DIAGNOSIS — F17.210 CIGARETTE NICOTINE DEPENDENCE WITHOUT COMPLICATION: ICD-10-CM

## 2025-06-16 DIAGNOSIS — R53.83 OTHER FATIGUE: ICD-10-CM

## 2025-06-16 DIAGNOSIS — R73.03 PRE-DIABETES: ICD-10-CM

## 2025-06-16 DIAGNOSIS — I25.10 CORONARY ARTERY DISEASE: Primary | ICD-10-CM

## 2025-06-16 DIAGNOSIS — M25.561 CHRONIC PAIN OF RIGHT KNEE: ICD-10-CM

## 2025-06-16 PROBLEM — L25.9 CONTACT DERMATITIS: Status: RESOLVED | Noted: 2022-06-27 | Resolved: 2025-06-16

## 2025-06-16 PROBLEM — N17.9 AKI (ACUTE KIDNEY INJURY) (HCC): Status: RESOLVED | Noted: 2022-06-22 | Resolved: 2025-06-16

## 2025-06-16 PROCEDURE — 99214 OFFICE O/P EST MOD 30 MIN: CPT | Performed by: FAMILY MEDICINE

## 2025-06-16 PROCEDURE — 99396 PREV VISIT EST AGE 40-64: CPT | Performed by: FAMILY MEDICINE

## 2025-06-16 RX ORDER — LISINOPRIL 10 MG/1
10 TABLET ORAL DAILY
Qty: 30 TABLET | Refills: 3 | Status: SHIPPED | OUTPATIENT
Start: 2025-06-16

## 2025-06-16 NOTE — ASSESSMENT & PLAN NOTE
Wt Readings from Last 3 Encounters:   06/16/25 (!) 139 kg (305 lb 9.6 oz)   08/01/24 135 kg (297 lb 6.4 oz)   02/03/23 124 kg (273 lb 5.9 oz)

## 2025-06-16 NOTE — PATIENT INSTRUCTIONS
"Patient Education     Routine physical for adults   The Basics   Written by the doctors and editors at [facility]   What is a physical? -- A physical is a routine visit, or \"check-up,\" with your doctor. You might also hear it called a \"wellness visit\" or \"preventive visit.\"  During each visit, the doctor will:   Ask about your physical and mental health   Ask about your habits, behaviors, and lifestyle   Do an exam   Give you vaccines if needed   Talk to you about any medicines you take   Give advice about your health   Answer your questions  Getting regular check-ups is an important part of taking care of your health. It can help your doctor find and treat any problems you have. But it's also important for preventing health problems.  A routine physical is different from a \"sick visit.\" A sick visit is when you see a doctor because of a health concern or problem. Since physicals are scheduled ahead of time, you can think about what you want to ask the doctor.  How often should I get a physical? -- It depends on your age and health. In general, for people age 21 years and older:   If you are younger than 50 years, you might be able to get a physical every 3 years.   If you are 50 years or older, your doctor might recommend a physical every year.  If you have an ongoing health condition, like diabetes or high blood pressure, your doctor will probably want to see you more often.  What happens during a physical? -- In general, each visit will include:   Physical exam - The doctor or nurse will check your height, weight, heart rate, and blood pressure. They will also look at your eyes and ears. They will ask about how you are feeling and whether you have any symptoms that bother you.   Medicines - It's a good idea to bring a list of all the medicines you take to each doctor visit. Your doctor will talk to you about your medicines and answer any questions. Tell them if you are having any side effects that bother you. You " "should also tell them if you are having trouble paying for any of your medicines.   Habits and behaviors - This includes:   Your diet   Your exercise habits   Whether you smoke, drink alcohol, or use drugs   Whether you are sexually active   Whether you feel safe at home  Your doctor will talk to you about things you can do to improve your health and lower your risk of health problems. They will also offer help and support. For example, if you want to quit smoking, they can give you advice and might prescribe medicines. If you want to improve your diet or get more physical activity, they can help you with this, too.   Lab tests, if needed - The tests you get will depend on your age and situation. For example, your doctor might want to check your:   Cholesterol   Blood sugar   Iron level   Vaccines - The recommended vaccines will depend on your age, health, and what vaccines you already had. Vaccines are very important because they can prevent certain serious or deadly infections.   Discussion of screening - \"Screening\" means checking for diseases or other health problems before they cause symptoms. Your doctor can recommend screening based on your age, risk, and preferences. This might include tests to check for:   Cancer, such as breast, prostate, cervical, ovarian, colorectal, prostate, lung, or skin cancer   Sexually transmitted infections, such as chlamydia and gonorrhea   Mental health conditions like depression and anxiety  Your doctor will talk to you about the different types of screening tests. They can help you decide which screenings to have. They can also explain what the results might mean.   Answering questions - The physical is a good time to ask the doctor or nurse questions about your health. If needed, they can refer you to other doctors or specialists, too.  Adults older than 65 years often need other care, too. As you get older, your doctor will talk to you about:   How to prevent falling at " home   Hearing or vision tests   Memory testing   How to take your medicines safely   Making sure that you have the help and support you need at home  All topics are updated as new evidence becomes available and our peer review process is complete.  This topic retrieved from [employer] on: May 02, 2024.  Topic 884526 Version 1.0  Release: 32.4.3 - C32.122  © 2024 UpToDate, Inc. and/or its affiliates. All rights reserved.  Consumer Information Use and Disclaimer   Disclaimer: This generalized information is a limited summary of diagnosis, treatment, and/or medication information. It is not meant to be comprehensive and should be used as a tool to help the user understand and/or assess potential diagnostic and treatment options. It does NOT include all information about conditions, treatments, medications, side effects, or risks that may apply to a specific patient. It is not intended to be medical advice or a substitute for the medical advice, diagnosis, or treatment of a health care provider based on the health care provider's examination and assessment of a patient's specific and unique circumstances. Patients must speak with a health care provider for complete information about their health, medical questions, and treatment options, including any risks or benefits regarding use of medications. This information does not endorse any treatments or medications as safe, effective, or approved for treating a specific patient. UpToDate, Inc. and its affiliates disclaim any warranty or liability relating to this information or the use thereof.The use of this information is governed by the Terms of Use, available at https://www.[employer]uwer.com/en/know/clinical-effectiveness-terms. 2024© UpToDate, Inc. and its affiliates and/or licensors. All rights reserved.  Copyright   © 2024 UpToDate, Inc. and/or its affiliates. All rights reserved.

## 2025-06-16 NOTE — PROGRESS NOTES
Adult Annual Physical  Name: Eugene Amaro      : 1970      MRN: [id number]  Encounter Provider: Rom Ba MD  Encounter Date: 2025   Encounter department: [facility]    :  Assessment & Plan  Coronary artery disease    Orders:  •  lisinopril (ZESTRIL) 10 mg tablet; Take 1 tablet (10 mg total) by mouth daily    Hereditary deficiency of other clotting factors (HCC)         Acute systolic heart failure (HCC)  Wt Readings from Last 3 Encounters:   25 (!) 139 kg (305 lb 9.6 oz)   24 135 kg (297 lb 6.4 oz)   23 124 kg (273 lb 5.9 oz)                    Persistent atrial fibrillation (HCC)         Hyperlipidemia, unspecified hyperlipidemia type    Orders:  •  Lipid panel; Future    Pre-diabetes    Orders:  •  Comprehensive metabolic panel; Future  •  Hemoglobin A1C; Future    Screening for prostate cancer    Orders:  •  PSA, Total Screen; Future    Screening for colon cancer    Orders:  •  Ambulatory referral to Gastroenterology; Future    Cigarette nicotine dependence without complication    Orders:  •  CT Lung Screening Program; Future    Other fatigue    Orders:  •  CBC and differential; Future    Chronic pain of right knee    Orders:  •  XR knee 4+ vw right injury; Future  •  Ambulatory Referral to Orthopedic Surgery; Future  •  Ambulatory Referral to Physical Therapy; Future    Annual physical exam  Normal exam    Follow up in 1 year           Preventive Screenings:  - Diabetes Screening: orders placed  - Cholesterol Screening: has hyperlipidemia and orders placed   - Hepatitis C screening: screening not indicated   - HIV screening: screening not indicated   - Colon cancer screening: orders placed   - Lung cancer screening: orders placed   - Prostate cancer screening: orders placed     Immunizations:  - Immunizations due: Prevnar 20, Tdap and Zoster (Shingrix)    Counseling/Anticipatory Guidance:  - Alcohol: discussed moderation in alcohol intake and  recommendations for healthy alcohol use.   - Tobacco use: discussed harms of tobacco use and management options for quitting.       Depression Screening and Follow-up Plan: Patient was screened for depression during today's encounter. They screened negative with a PHQ-9 score of 0.          History of Present Illness     Adult Annual Physical:  Patient presents for annual physical.     Diet and Physical Activity:  - Diet/Nutrition: no special diet.  - Exercise: no formal exercise.    Depression Screening:    - PHQ-9 Score: 0    General Health:  - Sleep: sleeps well and 7-8 hours of sleep on average.  - Hearing: normal hearing bilateral ears.  - Vision: most recent eye exam < 1 year ago and wears glasses and contacts.  - Dental: regular dental visits, brushes teeth twice daily and floss regularly.    Review of Systems   Constitutional:  Negative for activity change, appetite change, fatigue and fever.   HENT:  Negative for congestion and ear discharge.    Respiratory:  Negative for cough and shortness of breath.    Cardiovascular:  Negative for chest pain and palpitations.   Gastrointestinal:  Negative for diarrhea and nausea.   Musculoskeletal:  Negative for arthralgias and back pain.   Skin:  Negative for color change and rash.   Neurological:  Negative for dizziness and headaches.   Psychiatric/Behavioral:  Negative for agitation and behavioral problems.      Pertinent Medical History         Medical History Reviewed by provider this encounter:  Tobacco  Allergies  Meds  Problems  Med Hx  Surg Hx  Fam Hx     .  Past Medical History   Past Medical History[1]  Past Surgical History[2]  Family History[3]   reports that he has quit smoking. His smoking use included cigarettes. He has a 20 pack-year smoking history. He has quit using smokeless tobacco. He reports current alcohol use. He reports that he does not use drugs.  Current Outpatient Medications   Medication Instructions   • apixaban (ELIQUIS) 5 mg,  "Oral, 2 times daily   • aspirin (ECOTRIN LOW STRENGTH) 81 mg, Oral, Daily   • atorvastatin (LIPITOR) 40 mg, Oral, Daily with dinner   • atorvastatin (LIPITOR) 40 mg, Oral, Daily with dinner   • furosemide (LASIX) 40 mg, Oral, Daily   • lisinopril (ZESTRIL) 10 mg, Oral, Daily   • metoprolol succinate (TOPROL-XL) 25 mg, Oral, 2 times daily   Allergies[4]   Medications Ordered Prior to Encounter[5]   Social History[6]    Objective   BP (!) 174/108   Pulse 84   Temp 98.4 °F (36.9 °C)   Ht 6' 3\" (1.905 m)   Wt (!) 139 kg (305 lb 9.6 oz)   SpO2 98%   BMI 38.20 kg/m²     Physical Exam  Constitutional:       General: He is not in acute distress.     Appearance: He is well-developed. He is not diaphoretic.     Eyes:      General: No scleral icterus.     Pupils: Pupils are equal, round, and reactive to light.       Cardiovascular:      Rate and Rhythm: Normal rate and regular rhythm.      Heart sounds: Normal heart sounds. No murmur heard.  Pulmonary:      Effort: Pulmonary effort is normal. No respiratory distress.      Breath sounds: Normal breath sounds. No wheezing.   Abdominal:      General: Bowel sounds are normal. There is no distension.      Palpations: Abdomen is soft.      Tenderness: There is no abdominal tenderness.     Skin:     General: Skin is warm and dry.      Findings: No rash.     Neurological:      Mental Status: He is alert and oriented to person, place, and time.                [1]  Past Medical History:  Diagnosis Date   • Chest pain 9/25/2014   • Eczema    • Hidradenitis suppurativa 2/14/2018   • Rib pain 1/28/2015   [2]  Past Surgical History:  Procedure Laterality Date   • CARDIAC CATHETERIZATION Left 6/27/2022    Procedure: Cardiac catheterization;  Surgeon: Flor Lucia MD;  Location: [facility];  Service: Cardiology   • CARDIAC CATHETERIZATION N/A 6/27/2022    Procedure: Cardiac Coronary Angiogram;  Surgeon: Flor Lucia MD;  Location: [facility];  Service: " Cardiology   • CARDIAC CATHETERIZATION Left 6/27/2022    Procedure: Cardiac Left Heart Cath;  Surgeon: Flor Lucia MD;  Location: [facility];  Service: Cardiology   • CARDIAC CATHETERIZATION N/A 6/29/2022    Procedure: Cardiac pci;  Surgeon: Holger Arambula MD;  Location: [facility];  Service: Cardiology   • CARDIAC CATHETERIZATION N/A 6/29/2022    Procedure: Cardiac Coronary Angiogram;  Surgeon: Holger Arambula MD;  Location: [facility];  Service: Cardiology   • CARDIAC CATHETERIZATION  6/29/2022    Procedure: Cardiac catheterization;  Surgeon: Holger Arambula MD;  Location: [facility];  Service: Cardiology   • CARDIAC CATHETERIZATION Left 6/29/2022    Procedure: Cardiac Left Heart Cath;  Surgeon: Holger Arambula MD;  Location: [facility];  Service: Cardiology   • CARDIAC ELECTROPHYSIOLOGY PROCEDURE N/A 11/17/2022    Procedure: Cardiac eps/afib ablation;  Surgeon: Marcellus Shi MD;  Location: [facility];  Service: Cardiology   [3]  Family History  Problem Relation Name Age of Onset   • Mental illness Mother          denied   • Substance Abuse Mother          denied   • Mental illness Father          denied   • Substance Abuse Father          denied   • Heart disease Father          cardiac disorder   [4]  Allergies  Allergen Reactions   • Amiodarone Rash   [5]  Current Outpatient Medications on File Prior to Visit   Medication Sig Dispense Refill   • apixaban (Eliquis) 5 mg Take 1 tablet (5 mg total) by mouth 2 (two) times a day 60 tablet 0   • aspirin 81 mg EC tablet Take 1 tablet (81 mg total) by mouth daily for 20 days 20 tablet 0   • atorvastatin (LIPITOR) 40 mg tablet Take 1 tablet (40 mg total) by mouth daily with dinner 30 tablet 0   • atorvastatin (LIPITOR) 40 mg tablet Take 1 tablet (40 mg total) by mouth daily with dinner 90 tablet 3   • furosemide (LASIX) 40 mg tablet Take 1 tablet (40 mg total) by mouth daily 38 tablet 0   • metoprolol  succinate (TOPROL-XL) 25 mg 24 hr tablet TAKE ONE TABLET BY MOUTH TWICE DAILY 60 tablet 5   • [DISCONTINUED] lisinopril (ZESTRIL) 5 mg tablet TAKE ONE TABLET BY MOUTH ONE TIME DAILY 30 tablet 3     No current facility-administered medications on file prior to visit.   [6]  Social History  Tobacco Use   • Smoking status: Former     Current packs/day: 1.00     Average packs/day: 1 pack/day for 20.0 years (20.0 ttl pk-yrs)     Types: Cigarettes   • Smokeless tobacco: Former   Vaping Use   • Vaping status: Never Used   Substance and Sexual Activity   • Alcohol use: Yes     Comment: social; moderate   • Drug use: No   • Sexual activity: Yes     Partners: Female     Birth control/protection: Condom Male     Comment: single

## 2025-06-16 NOTE — ASSESSMENT & PLAN NOTE
Recommend to increase lisinopril to 10 mg daily    Orders:  •  lisinopril (ZESTRIL) 10 mg tablet; Take 1 tablet (10 mg total) by mouth daily

## 2025-06-16 NOTE — PROGRESS NOTES
Name: Eugene Amaro      : 1970      MRN: [id number]  Encounter Provider: Rom Ba MD  Encounter Date: 2025   Encounter department: [facility]  :  Assessment & Plan  Coronary artery disease  Recommend to increase lisinopril to 10 mg daily    Orders:  •  lisinopril (ZESTRIL) 10 mg tablet; Take 1 tablet (10 mg total) by mouth daily    Hereditary deficiency of other clotting factors (HCC)  Denies any bleeding or clotting episodes.       Acute systolic heart failure (HCC)  Wt Readings from Last 3 Encounters:   25 (!) 139 kg (305 lb 9.6 oz)   24 135 kg (297 lb 6.4 oz)   23 124 kg (273 lb 5.9 oz)     No acute decompensation  Advise to take lasix daily       Persistent atrial fibrillation (HCC)  No symptoms continue metoprolol and blood thinners daily       Hyperlipidemia, unspecified hyperlipidemia type    Orders:  •  Lipid panel; Future    Pre-diabetes    Orders:  •  Comprehensive metabolic panel; Future  •  Hemoglobin A1C; Future    Screening for prostate cancer    Orders:  •  PSA, Total Screen; Future    Screening for colon cancer    Orders:  •  Ambulatory referral to Gastroenterology; Future    Cigarette nicotine dependence without complication    Orders:  •  CT Lung Screening Program; Future    Other fatigue    Orders:  •  CBC and differential; Future    Chronic pain of right knee    Orders:  •  XR knee 4+ vw right injury; Future  •  Ambulatory Referral to Orthopedic Surgery; Future  •  Ambulatory Referral to Physical Therapy; Future    Annual physical exam  See above    Follow up in 1-2 months for a BP check              History of Present Illness   Patient is here for a check up.  He has a Hx of Atrial fibrillation, CHF and Hypertension. Currently on metoprolol and blood thinners. Denies any chest pain or SOB. Has not been taking lasix regularly.  He has been having pain for the past several months 8 months. Works in construction. Thinks he stepped the wrong  "way on his right foot several months ago while working.  He is able to apply full weight to his knee. Has been feeling patella knee laxity. Has medial aspect knee tenderness.        Review of Systems   Constitutional:  Negative for activity change, appetite change, fatigue and fever.   HENT:  Negative for congestion and ear discharge.    Respiratory:  Negative for cough and shortness of breath.    Cardiovascular:  Negative for chest pain and palpitations.   Gastrointestinal:  Negative for diarrhea and nausea.   Musculoskeletal:  Positive for arthralgias and myalgias. Negative for back pain.   Skin:  Negative for color change and rash.   Neurological:  Negative for dizziness and headaches.   Psychiatric/Behavioral:  Negative for agitation and behavioral problems.        Objective   BP (!) 174/108   Pulse 84   Temp 98.4 °F (36.9 °C)   Ht 6' 3\" (1.905 m)   Wt (!) 139 kg (305 lb 9.6 oz)   SpO2 98%   BMI 38.20 kg/m²      Physical Exam  Constitutional:       General: He is not in acute distress.     Appearance: He is well-developed. He is not diaphoretic.     Eyes:      General: No scleral icterus.     Pupils: Pupils are equal, round, and reactive to light.       Cardiovascular:      Rate and Rhythm: Normal rate and regular rhythm.      Heart sounds: Normal heart sounds. No murmur heard.  Pulmonary:      Effort: Pulmonary effort is normal. No respiratory distress.      Breath sounds: Normal breath sounds. No wheezing.   Abdominal:      General: Bowel sounds are normal. There is no distension.      Palpations: Abdomen is soft.      Tenderness: There is no abdominal tenderness.     Musculoskeletal:         General: Tenderness present.      Comments: Right knee medial aspect tenderness  No effusion  Walks with a limp favors left leg  Patella laxity     Skin:     General: Skin is warm and dry.      Findings: No rash.     Neurological:      Mental Status: He is alert and oriented to person, place, and time.         "

## 2025-06-16 NOTE — ASSESSMENT & PLAN NOTE
Wt Readings from Last 3 Encounters:   06/16/25 (!) 139 kg (305 lb 9.6 oz)   08/01/24 135 kg (297 lb 6.4 oz)   02/03/23 124 kg (273 lb 5.9 oz)     No acute decompensation  Advise to take lasix daily

## 2025-06-17 ENCOUNTER — APPOINTMENT (OUTPATIENT)
Dept: LAB | Facility: MEDICAL CENTER | Age: 55
End: 2025-06-17
Payer: COMMERCIAL

## 2025-06-17 ENCOUNTER — RESULTS FOLLOW-UP (OUTPATIENT)
Dept: CARDIOLOGY CLINIC | Facility: CLINIC | Age: 55
End: 2025-06-17

## 2025-06-17 DIAGNOSIS — R73.03 PRE-DIABETES: ICD-10-CM

## 2025-06-17 DIAGNOSIS — Z12.5 SCREENING FOR PROSTATE CANCER: ICD-10-CM

## 2025-06-17 DIAGNOSIS — I48.19 PERSISTENT ATRIAL FIBRILLATION (HCC): ICD-10-CM

## 2025-06-17 DIAGNOSIS — E78.5 HYPERLIPIDEMIA, UNSPECIFIED HYPERLIPIDEMIA TYPE: ICD-10-CM

## 2025-06-17 DIAGNOSIS — R53.83 OTHER FATIGUE: ICD-10-CM

## 2025-06-17 DIAGNOSIS — I25.10 CORONARY ARTERY DISEASE INVOLVING NATIVE CORONARY ARTERY OF NATIVE HEART WITHOUT ANGINA PECTORIS: ICD-10-CM

## 2025-06-17 LAB
ALBUMIN SERPL BCG-MCNC: 4.3 G/DL (ref 3.5–5)
ALP SERPL-CCNC: 96 U/L (ref 34–104)
ALT SERPL W P-5'-P-CCNC: 41 U/L (ref 7–52)
ANION GAP SERPL CALCULATED.3IONS-SCNC: 9 MMOL/L (ref 4–13)
AST SERPL W P-5'-P-CCNC: 24 U/L (ref 13–39)
BASOPHILS # BLD AUTO: 0.09 THOUSANDS/ÂΜL (ref 0–0.1)
BASOPHILS NFR BLD AUTO: 2 % (ref 0–1)
BILIRUB SERPL-MCNC: 0.46 MG/DL (ref 0.2–1)
BUN SERPL-MCNC: 17 MG/DL (ref 5–25)
CALCIUM SERPL-MCNC: 9.4 MG/DL (ref 8.4–10.2)
CHLORIDE SERPL-SCNC: 105 MMOL/L (ref 96–108)
CHOLEST SERPL-MCNC: 174 MG/DL (ref ?–200)
CO2 SERPL-SCNC: 26 MMOL/L (ref 21–32)
CREAT SERPL-MCNC: 1.14 MG/DL (ref 0.6–1.3)
EOSINOPHIL # BLD AUTO: 0.16 THOUSAND/ÂΜL (ref 0–0.61)
EOSINOPHIL NFR BLD AUTO: 3 % (ref 0–6)
ERYTHROCYTE [DISTWIDTH] IN BLOOD BY AUTOMATED COUNT: 13.2 % (ref 11.6–15.1)
EST. AVERAGE GLUCOSE BLD GHB EST-MCNC: 120 MG/DL
GFR SERPL CREATININE-BSD FRML MDRD: 72 ML/MIN/1.73SQ M
GLUCOSE P FAST SERPL-MCNC: 101 MG/DL (ref 65–99)
HBA1C MFR BLD: 5.8 %
HCT VFR BLD AUTO: 42.5 % (ref 36.5–49.3)
HDLC SERPL-MCNC: 52 MG/DL
HGB BLD-MCNC: 14.5 G/DL (ref 12–17)
IMM GRANULOCYTES # BLD AUTO: 0.06 THOUSAND/UL (ref 0–0.2)
IMM GRANULOCYTES NFR BLD AUTO: 1 % (ref 0–2)
LDLC SERPL CALC-MCNC: 100 MG/DL (ref 0–100)
LYMPHOCYTES # BLD AUTO: 1.93 THOUSANDS/ÂΜL (ref 0.6–4.47)
LYMPHOCYTES NFR BLD AUTO: 33 % (ref 14–44)
MCH RBC QN AUTO: 30.9 PG (ref 26.8–34.3)
MCHC RBC AUTO-ENTMCNC: 34.1 G/DL (ref 31.4–37.4)
MCV RBC AUTO: 91 FL (ref 82–98)
MONOCYTES # BLD AUTO: 0.51 THOUSAND/ÂΜL (ref 0.17–1.22)
MONOCYTES NFR BLD AUTO: 9 % (ref 4–12)
NEUTROPHILS # BLD AUTO: 3.17 THOUSANDS/ÂΜL (ref 1.85–7.62)
NEUTS SEG NFR BLD AUTO: 52 % (ref 43–75)
NONHDLC SERPL-MCNC: 122 MG/DL
NRBC BLD AUTO-RTO: 0 /100 WBCS
PLATELET # BLD AUTO: 276 THOUSANDS/UL (ref 149–390)
PMV BLD AUTO: 11.7 FL (ref 8.9–12.7)
POTASSIUM SERPL-SCNC: 4.8 MMOL/L (ref 3.5–5.3)
PROT SERPL-MCNC: 7.7 G/DL (ref 6.4–8.4)
PSA SERPL-MCNC: 1.14 NG/ML (ref 0–4)
RBC # BLD AUTO: 4.69 MILLION/UL (ref 3.88–5.62)
SODIUM SERPL-SCNC: 140 MMOL/L (ref 135–147)
TRIGL SERPL-MCNC: 111 MG/DL (ref ?–150)
WBC # BLD AUTO: 5.92 THOUSAND/UL (ref 4.31–10.16)

## 2025-06-17 PROCEDURE — 36415 COLL VENOUS BLD VENIPUNCTURE: CPT

## 2025-06-17 PROCEDURE — 83036 HEMOGLOBIN GLYCOSYLATED A1C: CPT

## 2025-06-17 PROCEDURE — G0103 PSA SCREENING: HCPCS

## 2025-06-17 PROCEDURE — 80061 LIPID PANEL: CPT

## 2025-06-17 PROCEDURE — 85025 COMPLETE CBC W/AUTO DIFF WBC: CPT

## 2025-06-17 PROCEDURE — 80053 COMPREHEN METABOLIC PANEL: CPT

## 2025-06-18 ENCOUNTER — RESULTS FOLLOW-UP (OUTPATIENT)
Dept: FAMILY MEDICINE CLINIC | Facility: CLINIC | Age: 55
End: 2025-06-18

## 2025-06-18 NOTE — TELEPHONE ENCOUNTER
----- Message from Marcellus Shi MD sent at 6/17/2025  7:05 PM EDT -----  Please call the patient about normal lab results.     Thank you.   ----- Message -----  From: Lab, Background User  Sent: 6/17/2025   3:50 PM EDT  To: Marcellus Shi MD

## 2025-07-26 DIAGNOSIS — I48.91 ATRIAL FIBRILLATION, UNSPECIFIED TYPE (HCC): ICD-10-CM

## 2025-07-26 DIAGNOSIS — I25.10 CORONARY ARTERY DISEASE: ICD-10-CM

## 2025-07-28 RX ORDER — LISINOPRIL 10 MG/1
10 TABLET ORAL DAILY
Qty: 30 TABLET | Refills: 5 | Status: SHIPPED | OUTPATIENT
Start: 2025-07-28

## 2025-07-28 RX ORDER — ATORVASTATIN CALCIUM 40 MG/1
40 TABLET, FILM COATED ORAL
Qty: 90 TABLET | Refills: 0 | Status: SHIPPED | OUTPATIENT
Start: 2025-07-28

## 2025-07-28 RX ORDER — METOPROLOL SUCCINATE 25 MG/1
25 TABLET, EXTENDED RELEASE ORAL 2 TIMES DAILY
Qty: 180 TABLET | Refills: 0 | Status: SHIPPED | OUTPATIENT
Start: 2025-07-28

## 2025-07-28 RX ORDER — FUROSEMIDE 40 MG/1
40 TABLET ORAL DAILY
Qty: 30 TABLET | Refills: 5 | Status: SHIPPED | OUTPATIENT
Start: 2025-07-28

## (undated) DEVICE — PINNACLE INTRODUCER SHEATH: Brand: PINNACLE

## (undated) DEVICE — GLIDESHEATH SLENDER STAINLESS STEEL KIT: Brand: GLIDESHEATH SLENDER

## (undated) DEVICE — TREK CORONARY DILATATION CATHETER 3.0 MM X 15 MM / RAPID-EXCHANGE: Brand: TREK

## (undated) DEVICE — Device: Brand: DECANAV

## (undated) DEVICE — DGW .035 FC J3MM 260CM TEF: Brand: EMERALD

## (undated) DEVICE — Device: Brand: PROTRACK PIGTAIL WIRE

## (undated) DEVICE — GUIDELINER CATHETERS ARE INTENDED TO BE USED IN CONJUNCTION WITH GUIDE CATHETERS TO ACCESS DISCRETE REGIONS OF THE CORONARY AND/OR PERIPHERAL VASCULATURE, AND TO FACILITATE PLACEMENT OF INTERVENTIONAL DEVICES.: Brand: GUIDELINER® V3 CATHETER

## (undated) DEVICE — RUNTHROUGH NS EXTRA FLOPPY PTCA GUIDEWIRE: Brand: RUNTHROUGH

## (undated) DEVICE — Device: Brand: VIZIGO

## (undated) DEVICE — CATH GUIDE LAUNCHER 6FR EBU 3.5

## (undated) DEVICE — TR BAND RADIAL ARTERY COMPRESSION DEVICE: Brand: TR BAND

## (undated) DEVICE — Device: Brand: SMARTABLATE

## (undated) DEVICE — CATH DIAG 5FR IMPULSE 100CM FL3.5

## (undated) DEVICE — CATH SHOCKWAVE C2 4 X 12MM

## (undated) DEVICE — Device: Brand: REFERENCE PATCH CARTO 3

## (undated) DEVICE — Device: Brand: THERMOCOOL SMARTTOUCH SF

## (undated) DEVICE — Device: Brand: PENTARAY NAV

## (undated) DEVICE — CATH DIAG 5FR IMPULSE 100CM FR4

## (undated) DEVICE — DGW .025 FC J3MM 260CM TEF: Brand: EMERALD

## (undated) DEVICE — CATH GUIDE LAUNCHER 6FR JR 4.0

## (undated) DEVICE — CATH IVUS EAGLE EYE PLATINUM 5FR 150CM

## (undated) DEVICE — NEEDLE PERCUTANEOUS 21G X 4CM

## (undated) DEVICE — CATH ULTRASOUND ACUNAV ICE 8FR 90CM GE VIVID-I

## (undated) DEVICE — GUIDEWIRE WHOLEY HI TORQUE INTERM MOD J .035 145CM

## (undated) DEVICE — NEEDLE TRANSSEPTAL BRK-1 98CM